# Patient Record
Sex: MALE | Race: WHITE | NOT HISPANIC OR LATINO | Employment: UNEMPLOYED | URBAN - METROPOLITAN AREA
[De-identification: names, ages, dates, MRNs, and addresses within clinical notes are randomized per-mention and may not be internally consistent; named-entity substitution may affect disease eponyms.]

---

## 2017-10-10 ENCOUNTER — APPOINTMENT (OUTPATIENT)
Dept: RADIOLOGY | Age: 72
End: 2017-10-10
Attending: PHYSICIAN ASSISTANT
Payer: COMMERCIAL

## 2017-10-10 ENCOUNTER — OFFICE VISIT (OUTPATIENT)
Dept: URGENT CARE | Age: 72
End: 2017-10-10
Payer: COMMERCIAL

## 2017-10-10 ENCOUNTER — TRANSCRIBE ORDERS (OUTPATIENT)
Dept: URGENT CARE | Age: 72
End: 2017-10-10

## 2017-10-10 DIAGNOSIS — M54.42 LOW BACK PAIN WITH LEFT-SIDED SCIATICA: ICD-10-CM

## 2017-10-10 PROCEDURE — 99203 OFFICE O/P NEW LOW 30 MIN: CPT | Performed by: FAMILY MEDICINE

## 2017-10-10 PROCEDURE — 72110 X-RAY EXAM L-2 SPINE 4/>VWS: CPT

## 2017-10-11 ENCOUNTER — GENERIC CONVERSION - ENCOUNTER (OUTPATIENT)
Dept: OTHER | Facility: OTHER | Age: 72
End: 2017-10-11

## 2017-10-29 NOTE — PROGRESS NOTES
Assessment    1  Acute left-sided low back pain with left-sided sciatica (724 2,724 3) (M54 42)    Plan  Acute left-sided low back pain with left-sided sciatica    · DrRx Flexeril 10 MG #30; 1/2-1 tablet every 8 hours or before bed as needed formuscle spasm and pain of back  Home use only   · PredniSONE 10 MG Oral Tablet; TAKE 6 TABLETS TODAY, THEN DECREASE BY 1TABLET EACH DAY UNTIL GONE  Unlinked    · * XR SPINE LUMBAR MINIMUM 4 VIEWS NON INJURY; Status:Complete;   Done:10Oct2017 08:05PM    Discussion/Summary  Discussion Summary:   Continue cold or warm compresses as needed  Start prednisone as directed  Use muscle relaxant as needed  Follow-up with family doctor in the next 2-3 days  Medication Side Effects Reviewed: Possible side effects of new medications were reviewed with the patient/guardian today  Understands and agrees with treatment plan: The treatment plan was reviewed with the patient/guardian  The patient/guardian understands and agrees with the treatment plan   Counseling Documentation With Imm: The patient, patient's family was counseled regarding diagnostic results,-- instructions for management  Follow Up Instructions: Follow Up with your Primary Care Provider in 3-5 days  If your symptoms worsen, go to the nearest [place] [address] Emergency Department  Chief Complaint    1  Back Pain  Chief Complaint Free Text Note Form: when to bend down t to  a pin cone and fell pain in the lower back and the pain it radiated to leg left side is been 1 week  stated he went to see the chiropractor 4 times and it did not work  History of Present Illness  HPI: 72-year-old male with low back pain that radiates down into his left leg to his knee, lateral aspect  Bent over last week to  a pinecone and had sudden back pain  No bowel or bladder dysfunction  Has been seeing chiropractor 4 times so far without relief   Has been using ice and trying to do home exercises and sleeping on the floor but is not getting better  Could not get into his family practice doctor today  He has been taking some Aleve  Chiropractor wanted x-rays  fever or chills  No unusual weight loss  No night sweats  No saddle anesthesia  Typically does not have high blood pressure  Hospital Based Practices Required Assessment:  Pain Assessment  the patient states they have pain  The patient describes the pain as sharp  (on a scale of 0 to 10, the patient rates the pain at 6 )  Abuse And Domestic Violence Screen   Yes, the patient is safe at home  -- The patient states no one is hurting them  Depression And Suicide Screen  No, the patient has not had thoughts of hurting themself  No, the patient has not felt depressed in the past 7 days  Prefered Language is  english  Review of Systems  Focused-Male:  Constitutional: no fever or chills, feels well, no tiredness, no recent weight loss or weight gain  Respiratory: no complaints of shortness of breath, no wheezing or cough, no dyspnea on exertion, no orthopnea or PND  Gastrointestinal: no complaints of abdominal pain, no constipation, no nausea or vomiting, no diarrhea or bloody stools  Genitourinary: no complaints of dysuria or incontinence, no hesitancy, no nocturia, no genital lesion, no inadequacy of penile erection  Musculoskeletal: as noted in HPI  Integumentary: no complaints of skin rash or lesion, no itching or dry skin, no skin wounds  Neurological: as noted in HPI  Active Problems  1  Acute upper respiratory infection (465 9) (J06 9)   2  Allergic rhinitis (477 9) (J30 9)   3  Benign essential hypertension (401 1) (I10)   4  Benign prostatic hypertrophy (600 00) (N40 0)   5  Concussion with no loss of consciousness (850 0) (S06 0X0A)   6  Herniated lumbar intervertebral disc (722 10) (M51 26)   7  Migraine headache (346 90) (G43 909)   8  Osteoarthritis (715 90) (M19 90)   9  Radiculopathy (729 2) (M54 10)    Past Medical History  1   History of Broken ribs (807 00) (S22 39XA)   2  History of Shoulder dislocation (831 00) (S43 006A)   3  History of Sore throat (462) (J02 9)  Active Problems And Past Medical History Reviewed: The active problems and past medical history were reviewed and updated today  Family History  Mother    1  Family history of Colon Cancer (V16 0)  Father    2  Family history of Cancer   3  Family history of Motor Vehicle Traffic Accident  Sister    4  Family history of Ovarian Cancer (V16 41)  Family History Reviewed: The family history was reviewed and updated today  Social History   · Being A Social Drinker   · Never A Smoker  Social History Reviewed: The social history was reviewed and updated today  Surgical History  1  History of Appendicostomy   2  History of Tonsillectomy  Surgical History Reviewed: The surgical history was reviewed and updated today  Current Meds  Medication List Reviewed: The medication list was reviewed and updated today  Allergies    1  No Known Drug Allergies    Vitals  Signs   Recorded: 93NIB4545 07:38PM   Temperature: 98 4 F, Temporal  Heart Rate: 84  Respiration: 18  Systolic: 416  Diastolic: 531  Height: 6 ft 1 in  Weight: 200 lb   BMI Calculated: 26 39  BSA Calculated: 2 15  O2 Saturation: 99  Pain Scale: 6    Physical Exam   Constitutional Well developed well-nourished male appears to be in mild to moderate pain sitting in stiff postured position on exam table  Pulmonary  Respiratory effort: No increased work of breathing or signs of respiratory distress  Auscultation of lungs: Clear to auscultation  Cardiovascular  Palpation of heart: Normal PMI, no thrills  Auscultation of heart: Normal rate and rhythm, normal S1 and S2, without murmurs  Musculoskeletal Limited range of motion of back with pain  Skin  Skin and subcutaneous tissue: Normal without rashes or lesions  Neurologic Patellar and Achilles DTRs are 1 over 4 bilaterally  -- Sensation left lateral thigh decreased to light touch on left versus right  Psychiatric  Orientation to person, place and time: Normal    Mood and affect: Normal        Results/Data  * XR SPINE LUMBAR MINIMUM 4 VIEWS NON INJURY 10Oct2017 08:05PM Martha Brambila Order Number: [id number]     Test Name Result Flag Reference   XR SPINE LUMBAR MINIMUM 4 VIEWS (Report)       LUMBAR SPINE   INDICATION: Low back pain radiating to the left side down to the knee  Symptoms started a week ago after bending over  COMPARISON: None   VIEWS: AP, lateral, bilateral oblique and coned down projections   IMAGES: 5   FINDINGS:   There is grade 1 anterolisthesis L4 on L5 which appears to be degenerative in etiology  There is no radiographic evidence of acute fracture or destructive osseous lesion  There is mild disc narrowing at L3-L4  There is moderate disc narrowing at L4-L5  There is mild narrowing at L5-S1  There is multilevel relatively severe degenerative arthritis of the facet joints from L3 to S1 with sclerotic and hypertrophic changes   evident at the facet joints bilaterally  A calcific density in the left upper quadrant is of uncertain etiology  It might be a kidney stone although it seems somewhat high  It could be a granuloma in the spleen  IMPRESSION:   Advanced chronic degenerative changes of the lower lumbar spine from L3 to S1 as detailed above with grade 1 anterolisthesis L4 on L5  No acute fractures are seen  Consider follow-up MRI for further assessment of any neural impingement given the history of radicular type pain    Workstation performed: [id number]   Signed by:  Meredith Estrada MD  10/11/17       Diagnostic Studies Reviewed: I personally reviewed the films/images/results in the office today  My interpretation follows  X-ray Review Lumbar spine:       Signatures   Electronically signed by :  Kendra Weems, [facility]; Oct 11 2017 12:20PM EST                       (Author)    Electronically signed by : Lupe Villarreal DO; Oct 12 2017  7:08AM EST                       (Co-author)

## 2018-01-16 NOTE — MISCELLANEOUS
Message  Called to patient to discuss radiology report on lumbar spine films  Discussed nodular lesion in left upper quadrant that could be kidney stone or granuloma in the spleen  Recommend patient follow with family doctor for further evaluation  Patient reports understanding  Signatures   Electronically signed by :  Aldo Gallegos, [facility]; Oct 11 2017 12:18PM EST                       (Author)

## 2018-01-22 ENCOUNTER — ALLSCRIPTS OFFICE VISIT (OUTPATIENT)
Dept: OTHER | Facility: OTHER | Age: 73
End: 2018-01-22

## 2018-01-22 DIAGNOSIS — Z12.5 ENCOUNTER FOR SCREENING FOR MALIGNANT NEOPLASM OF PROSTATE: ICD-10-CM

## 2018-01-22 DIAGNOSIS — R93.89 ABNORMAL FINDINGS ON DIAGNOSTIC IMAGING OF OTHER SPECIFIED BODY STRUCTURES: ICD-10-CM

## 2018-01-22 DIAGNOSIS — I10 ESSENTIAL (PRIMARY) HYPERTENSION: ICD-10-CM

## 2018-01-23 NOTE — PROGRESS NOTES
Assessment   1  Medicare annual wellness visit, subsequent (V70 0) (Z00 00)   2  Former smoker (V15 82) (Y61 062)   3  Herniated lumbar intervertebral disc (722 10) (M51 26)   4  Chronic bilateral low back pain with bilateral sciatica (724 2,724 3,338 29)     (M54 42,M54 41,G89 29)   5  Benign enlargement of prostate (600 00) (N40 0)   6  Need for vaccination with 13-polyvalent pneumococcal conjugate vaccine (V03 82) (Z23)   7  Benign essential hypertension (401 1) (I10)   8  Chest pain (786 50) (R07 9)   9  Eczema (692 9) (L30 9)   10  Prostate cancer screening (V76 44) (Z12 5)   11  Abnormal findings on diagnostic imaging of other specified body structures (793 99)      (R93 8)    Plan   Abnormal findings on diagnostic imaging of other specified body structures    · * US ABDOMEN COMPLETE; Status:Hold For - Scheduling; Requested for:22Jan2018; Benign essential hypertension    · Losartan Potassium 100 MG Oral Tablet; TAKE 1 TABLET DAILY   · (1) CBC/PLT/DIFF; Status:Active; Requested for:22Jan2018;    · (1) COMPREHENSIVE METABOLIC PANEL; Status:Active; Requested for:22Jan2018;    · (1) LIPID PANEL FASTING W DIRECT LDL REFLEX; Status:Active; Requested    for:22Jan2018;    · (1) TSH WITH FT4 REFLEX; Status:Active; Requested for:22Jan2018;    · EKG/ECG- POC; Status:Complete - Retrospective By Protocol Authorization;   Done:    25BNB2013 10:49AM  Chest pain    · *1 - [facility] (CARDIOLOGY ) Co-Management  *  Status: Active     Requested for: 65VOP3800  Care Summary provided  : Yes  Eczema    · Hydrocortisone 2 5 % External Cream; APPLY TO AFFECTED AREA TWICE DAILY    AS DIRECTED  Flu vaccine need    · Stop: Fluzone High-Dose 0 5 ML Intramuscular Suspension Prefilled    Syringe  Need for vaccination with 13-polyvalent pneumococcal conjugate vaccine    · Prevnar 13 Intramuscular Suspension  Prostate cancer screening    · (1) PSA (SCREEN) (Dx V76 44 Screen for Prostate Cancer); Status:Active;  Requested [employer]; Discussion/Summary   Discussion Summary:    Patient presents today for Medicare wellness visit as well as a follow-up for chronic health issues  He is experiencing hypertension  He has had multiple elevated blood pressure readings  I placed him on losartan 100 mg daily as he has had some very high readings recently  I would like him to get lab work as ordered  He is having some intermittent chest pain and his EKG shows a possible septal infarct  In light of his ongoing back pain and intermittent chest pain, I am going to refer him to Cardiology as quickly as possible  I am not convinced he could tolerate a stress echo  He will continue to follow closely with pain management  He does appear to have some diffuse eczema and I would like him to try Zyrtec 10 mg at bedtime and I also prescribed him hydrocortisone 2 5% cream to utilize twice daily as needed  Chief Complaint   Chief Complaint Free Text Note Form: Re-Establish care Flu shot      History of Present Illness   HPI: patient presents today for his 1st visit here in several years  He is concerned regarding some intermittent chest pain  He notes over the past year he has at least 6 or 7 episodes of chest pain  The most recent 1 occurred about a week ago while he was swimming  He became short of breath as well and had to stop swimming  He notes he is very active but has been limited lately by some significant back issues that are being monitored closely by pain management  His other episodes of chest pain have occurred mostly with stress, but occasionally with exertion  He has had no ongoing palpitations or lightheadedness  He has noted several elevated blood pressure readings while going to pain management  is having some chronic dry skin which is quite itchy diffusely  On his imaging for his low back issues he was noted to have a granuloma versus a kidney stone on the left upper abdomen  This has not been reimaged yet  Review of Systems   Complete-Male:      Constitutional: no fever,-- not feeling poorly,-- no recent weight gain,-- no chills,-- not feeling tired-- and-- no recent weight loss  Cardiovascular: chest pain, but-- as noted in HPI-- and-- the heart rate was not fast       Respiratory: shortness of breath during exertion, but-- no shortness of breath-- and-- no cough  Gastrointestinal: no abdominal pain,-- no nausea,-- no vomiting,-- no constipation-- and-- no diarrhea  Genitourinary: no dysuria-- and-- no urinary hesitancy  Musculoskeletal: no arthralgias  Integumentary: no rashes  Neurological: no headache  Psychiatric: depression, but-- as noted in HPI-- and-- no anxiety  Hematologic/Lymphatic: no tendency for easy bleeding-- and-- no tendency for easy bruising  Active Problems   1  Allergic rhinitis (477 9) (J30 9)   2  Benign enlargement of prostate (600 00) (N40 0)   3  Benign essential hypertension (401 1) (I10)   4  Herniated lumbar intervertebral disc (722 10) (M51 26)   5  Migraine headache (346 90) (G43 909)   6  Osteoarthritis (715 90) (M19 90)   7  Radiculopathy (729 2) (M54 10)    Past Medical History   1  History of Broken ribs (807 00) (S22 39XA)   2  History of Concussion with no loss of consciousness (850 0) (S06 0X0A)   3  History of Shoulder dislocation (831 00) (S43 006A)   4  History of Sore throat (462) (J02 9)  Active Problems And Past Medical History Reviewed: The active problems and past medical history were reviewed and updated today  Surgical History   1  History of Appendicostomy   2  History of Tonsillectomy  Surgical History Reviewed: The surgical history was reviewed and updated today  Family History   Mother    1  Family history of Colon Cancer (V16 0)  Father    2  Family history of Cancer   3  Family history of Motor Vehicle Traffic Accident  Sister    4   Family history of Ovarian Cancer (V16 41)  Family History Reviewed: The family history was reviewed and updated today  Social History    · Being A Social Drinker   · Former smoker (N19 02) (Z75 563)  Social History Reviewed: The social history was reviewed and updated today  Current Meds    1  Cetirizine HCl - 10 MG Oral Tablet; TAKE 1 TABLET AT BEDTIME; Therapy: 24PGC0521 to (Evaluate:19Oct2018) Recorded   2  Daily Multiple Vitamins TABS; TAKE 1 TABLET DAILY; Therapy: (Saba Sin) to Recorded   3  Glucosamine Chondroitin Complx TABS; Take 1 tablet daily; Therapy: (Recorded:22Jan2018) to Recorded    Allergies   1  Gabapentin CAPS    Vitals   Vital Signs    Recorded: 38MFC8359 10:30AM Recorded: 76MAD5732 09:54AM   Heart Rate  72   Respiration  18   Systolic 287 516   Diastolic 82 334   Height  6 ft 1 in   Weight  211 lb    BMI Calculated  27 84   BSA Calculated  2 2   O2 Saturation  97, RA     Physical Exam        Constitutional      General appearance: No acute distress, well appearing and well nourished  Eyes      Conjunctiva and lids: No swelling, erythema, or discharge  Pupils and irises: Equal, round and reactive to light  Ears, Nose, Mouth, and Throat      Nasal mucosa, septum, and turbinates: Normal without edema or erythema  Oropharynx: Normal with no erythema, edema, exudate or lesions  Pulmonary      Respiratory effort: No increased work of breathing or signs of respiratory distress  Auscultation of lungs: Clear to auscultation, equal breath sounds bilaterally, no wheezes, no rales, no rhonci  Cardiovascular      Auscultation of heart: Normal rate and rhythm, normal S1 and S2, without murmurs  Examination of extremities for edema and/or varicosities: Normal        Abdomen      Abdomen: Non-tender, no masses  Liver and spleen: No hepatomegaly or splenomegaly  Skin      Examination of the skin for lesions: Abnormal  -- Diffuse dry skin with dry patches        Psychiatric Orientation to person, place and time: Normal        Mood and affect: Normal           Results/Data   EKG/ECG- POC [id number] 10:49AM [facility]      Test Name Result Flag Reference   EKG/ECG 1/22/2018          Signatures    Electronically signed by : LANE Torres ; Jan 22 2018 11:23AM EST                       (Author)

## 2018-01-24 ENCOUNTER — APPOINTMENT (OUTPATIENT)
Dept: LAB | Age: 73
End: 2018-01-24
Payer: COMMERCIAL

## 2018-01-24 ENCOUNTER — HOSPITAL ENCOUNTER (OUTPATIENT)
Dept: RADIOLOGY | Age: 73
Discharge: HOME | End: 2018-01-24
Payer: COMMERCIAL

## 2018-01-24 ENCOUNTER — TRANSCRIBE ORDERS (OUTPATIENT)
Dept: ADMINISTRATIVE | Age: 73
End: 2018-01-24

## 2018-01-24 DIAGNOSIS — Z12.5 ENCOUNTER FOR SCREENING FOR MALIGNANT NEOPLASM OF PROSTATE: ICD-10-CM

## 2018-01-24 DIAGNOSIS — R93.89 ABNORMAL FINDINGS ON DIAGNOSTIC IMAGING OF OTHER SPECIFIED BODY STRUCTURES: ICD-10-CM

## 2018-01-24 DIAGNOSIS — I10 ESSENTIAL (PRIMARY) HYPERTENSION: ICD-10-CM

## 2018-01-24 LAB
ALBUMIN SERPL BCP-MCNC: 3.9 G/DL (ref 3.5–5)
ALP SERPL-CCNC: 73 U/L (ref 46–116)
ALT SERPL W P-5'-P-CCNC: 24 U/L (ref 12–78)
ANION GAP SERPL CALCULATED.3IONS-SCNC: 6 MMOL/L (ref 4–13)
AST SERPL W P-5'-P-CCNC: 18 U/L (ref 5–45)
BASOPHILS # BLD AUTO: 0.09 THOUSANDS/ΜL (ref 0–0.1)
BASOPHILS NFR BLD AUTO: 1 % (ref 0–1)
BILIRUB SERPL-MCNC: 1.12 MG/DL (ref 0.2–1)
BUN SERPL-MCNC: 17 MG/DL (ref 5–25)
CALCIUM SERPL-MCNC: 9.4 MG/DL (ref 8.3–10.1)
CHLORIDE SERPL-SCNC: 104 MMOL/L (ref 100–108)
CHOLEST SERPL-MCNC: 192 MG/DL (ref 50–200)
CO2 SERPL-SCNC: 32 MMOL/L (ref 21–32)
CREAT SERPL-MCNC: 1.21 MG/DL (ref 0.6–1.3)
EOSINOPHIL # BLD AUTO: 0.54 THOUSAND/ΜL (ref 0–0.61)
EOSINOPHIL NFR BLD AUTO: 6 % (ref 0–6)
ERYTHROCYTE [DISTWIDTH] IN BLOOD BY AUTOMATED COUNT: 13.6 % (ref 11.6–15.1)
GFR SERPL CREATININE-BSD FRML MDRD: 59 ML/MIN/1.73SQ M
GLUCOSE P FAST SERPL-MCNC: 95 MG/DL (ref 65–99)
HCT VFR BLD AUTO: 46.5 % (ref 36.5–49.3)
HDLC SERPL-MCNC: 62 MG/DL (ref 40–60)
HGB BLD-MCNC: 15.4 G/DL (ref 12–17)
LDLC SERPL CALC-MCNC: 111 MG/DL (ref 0–100)
LYMPHOCYTES # BLD AUTO: 1.83 THOUSANDS/ΜL (ref 0.6–4.47)
LYMPHOCYTES NFR BLD AUTO: 20 % (ref 14–44)
MCH RBC QN AUTO: 30.7 PG (ref 26.8–34.3)
MCHC RBC AUTO-ENTMCNC: 33.1 G/DL (ref 31.4–37.4)
MCV RBC AUTO: 93 FL (ref 82–98)
MONOCYTES # BLD AUTO: 0.76 THOUSAND/ΜL (ref 0.17–1.22)
MONOCYTES NFR BLD AUTO: 8 % (ref 4–12)
NEUTROPHILS # BLD AUTO: 6.05 THOUSANDS/ΜL (ref 1.85–7.62)
NEUTS SEG NFR BLD AUTO: 65 % (ref 43–75)
NRBC BLD AUTO-RTO: 0 /100 WBCS
PLATELET # BLD AUTO: 357 THOUSANDS/UL (ref 149–390)
PMV BLD AUTO: 10.4 FL (ref 8.9–12.7)
POTASSIUM SERPL-SCNC: 4.7 MMOL/L (ref 3.5–5.3)
PROT SERPL-MCNC: 7.2 G/DL (ref 6.4–8.2)
PSA SERPL-MCNC: 0.8 NG/ML (ref 0–4)
RBC # BLD AUTO: 5.01 MILLION/UL (ref 3.88–5.62)
SODIUM SERPL-SCNC: 142 MMOL/L (ref 136–145)
TRIGL SERPL-MCNC: 94 MG/DL
TSH SERPL DL<=0.05 MIU/L-ACNC: 3.2 UIU/ML (ref 0.36–3.74)
WBC # BLD AUTO: 9.3 THOUSAND/UL (ref 4.31–10.16)

## 2018-01-24 PROCEDURE — G0103 PSA SCREENING: HCPCS

## 2018-01-24 PROCEDURE — 85025 COMPLETE CBC W/AUTO DIFF WBC: CPT

## 2018-01-24 PROCEDURE — 36415 COLL VENOUS BLD VENIPUNCTURE: CPT

## 2018-01-24 PROCEDURE — 80061 LIPID PANEL: CPT

## 2018-01-24 PROCEDURE — 76700 US EXAM ABDOM COMPLETE: CPT

## 2018-01-24 PROCEDURE — 84443 ASSAY THYROID STIM HORMONE: CPT

## 2018-01-24 PROCEDURE — 80053 COMPREHEN METABOLIC PANEL: CPT

## 2018-01-24 NOTE — PROGRESS NOTES
Assessment    1  Medicare annual wellness visit, subsequent (V70 0) (Z00 00)   2  Former smoker (V15 82) (A52 254)   · quit 1974   3  Herniated lumbar intervertebral disc (722 10) (M51 26)   · L4-L5   4  Chronic bilateral low back pain with bilateral sciatica (724 2,724 3,338 29)   (M54 42,M54 41,G89 29)   5  Benign enlargement of prostate (600 00) (N40 0)   6  Need for vaccination with 13-polyvalent pneumococcal conjugate vaccine (V03 82) (Z23)   7  Benign essential hypertension (401 1) (I10)   8  Chest pain (786 50) (R07 9)   9  Eczema (692 9) (L30 9)   10  Prostate cancer screening (V76 44) (Z12 5)   11  Abnormal findings on diagnostic imaging of other specified body structures (793 99)    (R93 8)    Plan  Abnormal findings on diagnostic imaging of other specified body structures    · * US ABDOMEN COMPLETE; Status:Active; Requested for:22Jan2018; Benign essential hypertension    · Losartan Potassium 100 MG Oral Tablet; TAKE 1 TABLET DAILY   · (1) CBC/PLT/DIFF; Status:Active; Requested for:22Jan2018;    · (1) COMPREHENSIVE METABOLIC PANEL; Status:Active; Requested for:22Jan2018;    · (1) LIPID PANEL FASTING W DIRECT LDL REFLEX; Status:Active; Requested  for:22Jan2018;    · (1) TSH WITH FT4 REFLEX; Status:Active;  Requested for:22Jan2018;    · EKG/ECG- POC; Status:Complete - Retrospective By Protocol Authorization;   Done:  73DVJ7130 10:49AM  Chest pain    · *1 - [facility] (CARDIOLOGY ) Co-Management  *  Status: Complete   Done: [id number]  Care Summary provided  : Yes  Eczema    · Hydrocortisone 2 5 % External Cream; APPLY TO AFFECTED AREA TWICE DAILY  AS DIRECTED  Exercise counseling    · There are many exercise options for seniors ; Status:Complete - Retrospective By  Protocol Authorization;   Done: [id number]  Flu vaccine need    · Stop: Fluzone High-Dose 0 5 ML Intramuscular Suspension Prefilled  Syringe  Medicare annual wellness visit, subsequent    · *VB - Fall Risk Assessment  (Dx Z13 89 Screen for Neurologic Disorder);  Status:Complete - Retrospective By Protocol Authorization;   Done: 32MLH9838 11:39AM   · *VB - Urinary Incontinence Screen (Dx Z13 89 Screen for UI); Status:Complete -  Retrospective By Protocol Authorization;   Done: 08OSU9670 11:39AM   · *VB-Depression Screening; Status:Complete - Retrospective By Protocol Authorization;    Done: 49WZM6975 11:39AM  Need for vaccination with 13-polyvalent pneumococcal conjugate vaccine    · Prevnar 13 Intramuscular Suspension  Prostate cancer screening    · (1) PSA (SCREEN) (Dx V76 44 Screen for Prostate Cancer); Status:Active; Requested  for:22Jan2018; Discussion/Summary    Patient presents today for Medicare wellness visit  He is having some situational depression based on his ongoing back pain  I mentioned possibly trying him on Duloxetine, but he would like to hold off on that for the time being  He has a living will and he will get us a copy of that at some point  he will have his Prevnar 13 vaccine today but declines flu shot  He is up-to-date on colonoscopy  PSA will be checked with labs today for prostate cancer screening  He will continue to follow closely with pain management for his ongoing back issues  Impression: Subsequent Annual Wellness Visit  Chief Complaint  AWV/ Re-Establish care  Decline's Flu shot      History of Present Illness  Welcome to Medicare and Wellness Visits: The patient is being seen for the subsequent annual wellness visit  Medicare Screening and Risk Factors   Hospitalizations: no previous hospitalizations  Medicare Screening Tests Risk Questions   Drug and Alcohol Use: The patient is a former cigarette smoker  The patient reports drinking 1-2 drinks per month  He has never used illicit drugs     Diet and Physical Activity: Current diet includes well balanced meals, limited junk food, 1 servings of fruit per day, 1 servings of vegetables per day, 1 servings of meat per day, 1 servings of whole grains per day, 1 servings of dairy products per day, 1 cups of coffee per day, 1 cups of tea per day and 3-4 cups of water daily  He exercises 3 times per week  Exercise: walking, swimming, stretching 30 minutes per day  Mood Disorder and Cognitive Impairment Screening:   Depression screening  negative for symptoms  He denies feeling down, depressed, or hopeless over the past two weeks  He denies feeling little interest or pleasure in doing things over the past two weeks  Cognitive impairment screening: denies difficulty learning/retaining new information, denies difficulty handling complex tasks, denies difficulty with reasoning, denies difficulty with spatial ability and orientation, denies difficulty with language and denies difficulty with behavior  Functional Ability/Level of Safety: Hearing is slightly decreased, slightly decreased in the right ear and slightly decreased in the left ear  He does not use a hearing aid  Activities of daily living details: does not need help using the phone, no transportation help needed, does not need help shopping, no meal preparation help needed, does not need help doing housework, does not need help doing laundry, does not need help managing medications and does not need help managing money  Fall risk factors: The patient fell 0 times in the past 12 months  Home safety risk factors:  household clutter and no grab bars in the bathroom, but no unfamiliar surroundings, no loose rugs, no poor household lighting, no uneven floors and handrails on the stairs  Advance Directives: Advance directives: living will, durable power of  for health care directives, advance directives and Pt Instructed to bring in copy of living will at next appt  Co-Managers and Medical Equipment/Suppliers: See Patient Care Team      Active Problems    1  Allergic rhinitis (477 9) (J30 9)   2  Benign enlargement of prostate (600 00) (N40 0)   3   Benign essential hypertension (401 1) (I10)   4  Flu vaccine need (V04 81) (Z23)   5  Herniated lumbar intervertebral disc (722 10) (M51 26)   6  Migraine headache (346 90) (G43 909)   7  Osteoarthritis (715 90) (M19 90)   8  Radiculopathy (729 2) (M54 10)    Past Medical History    · History of Broken ribs (807 00) (S22 39XA)   · History of Concussion with no loss of consciousness (850 0) (S06 0X0A)   · History of Shoulder dislocation (831 00) (S43 006A)   · History of Sore throat (462) (J02 9)    The active problems and past medical history were reviewed and updated today  Surgical History    · History of Appendicostomy   · History of Tonsillectomy    Family History  Mother    · Family history of Colon Cancer (V16 0)  Father    · Family history of Cancer   · Family history of Motor Vehicle Traffic Accident  Sister    · Family history of Ovarian Cancer (V16 41)    The family history was reviewed and updated today  Social History    · Being A Social Drinker   · Former smoker (W83 44) (B12 382)   · quit 1974  The social history was reviewed and updated today  Current Meds   1  Cetirizine HCl - 10 MG Oral Tablet; TAKE 1 TABLET AT BEDTIME; Therapy: 20ZIN6990 to (Evaluate:19Oct2018) Recorded   2  Daily Multiple Vitamins TABS; TAKE 1 TABLET DAILY; Therapy: (21 ) to Recorded   3  Glucosamine Chondroitin Complx TABS; Take 1 tablet daily; Therapy: (Recorded:22Jan2018) to Recorded    The medication list was reviewed and updated today  Allergies    1   Gabapentin CAPS    Immunizations   1    PPSV  14-May-2014    Tdap  14-May-2014    Varicella  2011     Vitals  Signs    Systolic: 664  Diastolic: 82   Heart Rate: 72  Respiration: 18  Systolic: 486  Diastolic: 728  Height: 6 ft 1 in  Weight: 211 lb   BMI Calculated: 27 84  BSA Calculated: 2 2  O2 Saturation: 97, RA    Physical Exam    Genitourinary   Digital rectal exam of prostate: Abnormal   The prostate was enlarged, but had no palpable nodules, was nontender and was not fluctuant        Results/Data  EKG/ECG- POC [id number] 10:49AM [place] Isidro Babinski     Test Name Result Flag Reference   EKG/ECG 1/22/2018         Signatures   Electronically signed by : LANE Mahajan ; Jan 22 2018 12:49PM EST                       (Author)

## 2018-01-26 ENCOUNTER — TELEPHONE (OUTPATIENT)
Dept: FAMILY MEDICINE CLINIC | Facility: CLINIC | Age: 73
End: 2018-01-26

## 2018-01-26 NOTE — TELEPHONE ENCOUNTER
----- Message from Sunni Fofana MD sent at 1/25/2018  9:52 PM EST -----  Call patient regarding test   US OK - confirms benign spleen lesion   He has a benign kidney cyst as well

## 2018-02-09 ENCOUNTER — OFFICE VISIT (OUTPATIENT)
Dept: CARDIOLOGY CLINIC | Facility: CLINIC | Age: 73
End: 2018-02-09
Payer: COMMERCIAL

## 2018-02-09 VITALS
WEIGHT: 214 LBS | DIASTOLIC BLOOD PRESSURE: 86 MMHG | HEART RATE: 60 BPM | HEIGHT: 73 IN | SYSTOLIC BLOOD PRESSURE: 146 MMHG | BODY MASS INDEX: 28.36 KG/M2

## 2018-02-09 DIAGNOSIS — R07.9 CHEST PAIN, UNSPECIFIED TYPE: Primary | ICD-10-CM

## 2018-02-09 PROCEDURE — 93000 ELECTROCARDIOGRAM COMPLETE: CPT | Performed by: INTERNAL MEDICINE

## 2018-02-09 PROCEDURE — 99203 OFFICE O/P NEW LOW 30 MIN: CPT | Performed by: INTERNAL MEDICINE

## 2018-02-09 RX ORDER — LOSARTAN POTASSIUM 100 MG/1
1 TABLET ORAL DAILY
COMMUNITY
Start: 2018-01-22 | End: 2019-01-14 | Stop reason: SDUPTHER

## 2018-02-09 RX ORDER — CETIRIZINE HYDROCHLORIDE 10 MG/1
1 TABLET ORAL
COMMUNITY
Start: 2018-01-22

## 2018-02-09 RX ORDER — MULTIVITAMIN
1 TABLET ORAL DAILY
COMMUNITY

## 2018-02-09 RX ORDER — LANOLIN ALCOHOL/MO/W.PET/CERES
1 CREAM (GRAM) TOPICAL DAILY
COMMUNITY

## 2018-02-09 NOTE — PROGRESS NOTES
Consultation - Cardiology   Virginia Favre 67 y o  male MRN: [id number]  Unit/Bed#:  Encounter: [id number]  Physician Requesting Consult: No att  providers found  Reason for Consult / Principal Problem:  Chest pain    Assessment:  Active Problems:    * No active hospital problems  *   Chest pain syndrome    Plan:  Chest pain syndrome, somewhat atypical   Favor noninvasive evaluation  The patient feels he can walk on a treadmill, will do a stress test and echocardiogram   Favor no interventions he had about tests are unremarkable  No restrictions of medications at the present time  History of Present Illness     HPI: Virginia Favre is a 67y o  year old male who presents with chest discomfort, kindly referred by primary physician  Most pleasant 66-year-old white male who has no previous cardiac history  The patient states he had been experiencing somewhat localized left precordial chest discomfort  Mild-to-moderate in intensity  There is no radiation  There is no associated diaphoresis, this discomfort is typically nonexertional but can read during exercise as well  There are no particular triggering factors  Pecolia Harrison was resolve spontaneously  He has had no prolonged episodes  The patient states this is been going on for several months, there is possibly some crescendo pattern over last several weeks  But overall is not very frequent  The patient suffered from DJD of the lumbosacral spine and is undergoing topical injections  Despite that he still very active, enjoys swimming, he does that about 3 times a week  Mild-to-moderate intensity without any symptoms most of the time  He has history of hypertension which is well control medications  His lipids recently checked, and  with total cholesterol 192 and an HDL 62        Review of Systems:    Alert awake oriented, comfortable, denies any complaints  No fevers chills nausea vomiting  No weakness, dizziness, seizures  no cough, shortness of breath, or wheezing  Denies any palpitations, chest pain, diaphoresis  Denies leg edema, pain or paresthesias  Denies any skin rashes he has dry skin eczema  Denies abdominal pain, bloody stools, masses  Poor sleep secondary to DJD  Decreased energy  Historical Information   Past Medical History:   Diagnosis Date    Benign essential hypertension     Chest pain      Past Surgical History:   Procedure Laterality Date    CONTINENT APPENDICOSTOMY      TONSILLECTOMY       History   Alcohol Use    Yes     Comment: SOCIAL     History   Drug use: Unknown     History   Smoking Status    Former Smoker   Smokeless Tobacco    Never Used     Family History: non-contributory    Meds/Allergies   all current active meds have been reviewed  Allergies   Allergen Reactions    Gabapentin Other (See Comments)     Severe mood swings, anger       Objective   Vitals: Blood pressure 146/86, pulse 60, height 6' 1" (1 854 m), weight 97 1 kg (214 lb)  , Body mass index is 28 23 kg/m²  ,     [id number]            Physical Exam:  GEN: Easton Rae appears well, alert and oriented x 3, pleasant and cooperative   HEENT: pupils equal, round, anicteric sclera  NECK: supple,2+upstroke, no carotid bruits , no JVD  HEART: regular rhythm, normal S1 and S2, no murmurs, clicks, gallops or rubs   Mild left-sided precordial tenderness, slightly reproduce symptoms  LUNGS: clear to auscultation bilaterally; no wheezes, rales, or rhonchi   ABDOMEN: normal bowel sounds, soft, no tenderness, no distention  EXTREMITIES: peripheral pulses 2+l; no clubbing, cyanosis, or edema  NEURO: no focal findings   SKIN: normal, no erythema, no diaphoresis , without suspicious lesions on exposed skin    Lab Results:   No visits with results within 1 Day(s) from this visit     Latest known visit with results is:   Appointment on 01/24/2018   Component Date Value Ref Range Status    Cholesterol 01/24/2018 192  50 - 200 mg/dL Final    Triglycerides 01/24/2018 94 <=150 mg/dL Final    HDL, Direct 01/24/2018 62* 40 - 60 mg/dL Final    LDL Calculated 01/24/2018 111* 0 - 100 mg/dL Final    Sodium 01/24/2018 142  136 - 145 mmol/L Final    Potassium 01/24/2018 4 7  3 5 - 5 3 mmol/L Final    Chloride 01/24/2018 104  100 - 108 mmol/L Final    CO2 01/24/2018 32  21 - 32 mmol/L Final    Anion Gap 01/24/2018 6  4 - 13 mmol/L Final    BUN 01/24/2018 17  5 - 25 mg/dL Final    Creatinine 01/24/2018 1 21  0 60 - 1 30 mg/dL Final    Glucose, Fasting 01/24/2018 95  65 - 99 mg/dL Final    Calcium 01/24/2018 9 4  8 3 - 10 1 mg/dL Final    AST 01/24/2018 18  5 - 45 U/L Final    ALT 01/24/2018 24  12 - 78 U/L Final    Alkaline Phosphatase 01/24/2018 73  46 - 116 U/L Final    Total Protein 01/24/2018 7 2  6 4 - 8 2 g/dL Final    Albumin 01/24/2018 3 9  3 5 - 5 0 g/dL Final    Total Bilirubin 01/24/2018 1 12* 0 20 - 1 00 mg/dL Final    eGFR 01/24/2018 59  ml/min/1 73sq m Final    TSH 3RD GENERATON 01/24/2018 3 200  0 358 - 3 740 uIU/mL Final    PSA 01/24/2018 0 8  0 0 - 4 0 ng/mL Final    WBC 01/24/2018 9 30  4 31 - 10 16 Thousand/uL Final    RBC 01/24/2018 5 01  3 88 - 5 62 Million/uL Final    Hemoglobin 01/24/2018 15 4  12 0 - 17 0 g/dL Final    Hematocrit 01/24/2018 46 5  36 5 - 49 3 % Final    MCV 01/24/2018 93  82 - 98 fL Final    MCH 01/24/2018 30 7  26 8 - 34 3 pg Final    MCHC 01/24/2018 33 1  31 4 - 37 4 g/dL Final    RDW 01/24/2018 13 6  11 6 - 15 1 % Final    MPV 01/24/2018 10 4  8 9 - 12 7 fL Final    Platelets 99/59/6537 357  149 - 390 Thousands/uL Final    nRBC 01/24/2018 0  /100 WBCs Final    Neutrophils Relative 01/24/2018 65  43 - 75 % Final    Lymphocytes Relative 01/24/2018 20  14 - 44 % Final    Monocytes Relative 01/24/2018 8  4 - 12 % Final    Eosinophils Relative 01/24/2018 6  0 - 6 % Final    Basophils Relative 01/24/2018 1  0 - 1 % Final    Neutrophils Absolute 01/24/2018 6 05  1 85 - 7 62 Thousands/µL Final    Lymphocytes Absolute 01/24/2018 1 83  0 60 - 4 47 Thousands/µL Final    Monocytes Absolute 01/24/2018 0 76  0 17 - 1 22 Thousand/µL Final    Eosinophils Absolute 01/24/2018 0 54  0 00 - 0 61 Thousand/µL Final    Basophils Absolute 01/24/2018 0 09  0 00 - 0 10 Thousands/µL Final                       Invalid input(s): LABALBU      Chest X-Ray is obtained; result -      Imaging:     EKG:  Normal sinus rhythm normal EKG    Echo:

## 2018-02-26 ENCOUNTER — HOSPITAL ENCOUNTER (OUTPATIENT)
Dept: NON INVASIVE DIAGNOSTICS | Facility: CLINIC | Age: 73
Discharge: HOME | End: 2018-02-26
Payer: COMMERCIAL

## 2018-02-26 DIAGNOSIS — R07.9 CHEST PAIN, UNSPECIFIED TYPE: ICD-10-CM

## 2018-02-26 PROCEDURE — 93016 CV STRESS TEST SUPVJ ONLY: CPT | Performed by: INTERNAL MEDICINE

## 2018-02-26 PROCEDURE — 93018 CV STRESS TEST I&R ONLY: CPT | Performed by: INTERNAL MEDICINE

## 2018-02-26 PROCEDURE — 93017 CV STRESS TEST TRACING ONLY: CPT

## 2018-02-26 PROCEDURE — 93306 TTE W/DOPPLER COMPLETE: CPT | Performed by: INTERNAL MEDICINE

## 2018-02-26 PROCEDURE — 93306 TTE W/DOPPLER COMPLETE: CPT

## 2018-02-27 LAB
CHEST PAIN STATEMENT: NORMAL
MAX DIASTOLIC BP: 100 MMHG
MAX HEART RATE: 150 BPM
MAX PREDICTED HEART RATE: 148 BPM
MAX. SYSTOLIC BP: 190 MMHG
PROTOCOL NAME: NORMAL
REASON FOR TERMINATION: NORMAL
TARGET HR FORMULA: NORMAL
TEST INDICATION: NORMAL
TIME IN EXERCISE PHASE: NORMAL

## 2018-04-03 PROBLEM — G89.29 CHRONIC BILATERAL LOW BACK PAIN WITH BILATERAL SCIATICA: Status: ACTIVE | Noted: 2018-01-22

## 2018-04-03 PROBLEM — M54.42 CHRONIC BILATERAL LOW BACK PAIN WITH BILATERAL SCIATICA: Status: ACTIVE | Noted: 2018-01-22

## 2018-04-03 PROBLEM — R93.89 ABNORMAL FINDINGS ON DIAGNOSTIC IMAGING OF OTHER SPECIFIED BODY STRUCTURES: Status: ACTIVE | Noted: 2018-01-22

## 2018-04-03 PROBLEM — M54.41 CHRONIC BILATERAL LOW BACK PAIN WITH BILATERAL SCIATICA: Status: ACTIVE | Noted: 2018-01-22

## 2018-04-03 PROBLEM — M54.42 ACUTE LEFT-SIDED LOW BACK PAIN WITH LEFT-SIDED SCIATICA: Status: ACTIVE | Noted: 2017-10-10

## 2018-04-04 ENCOUNTER — CONSULT (OUTPATIENT)
Dept: FAMILY MEDICINE CLINIC | Facility: CLINIC | Age: 73
End: 2018-04-04
Payer: COMMERCIAL

## 2018-04-04 VITALS
BODY MASS INDEX: 28.6 KG/M2 | DIASTOLIC BLOOD PRESSURE: 84 MMHG | SYSTOLIC BLOOD PRESSURE: 150 MMHG | WEIGHT: 215.8 LBS | HEIGHT: 73 IN

## 2018-04-04 DIAGNOSIS — I10 BENIGN ESSENTIAL HYPERTENSION: ICD-10-CM

## 2018-04-04 DIAGNOSIS — Z01.818 PREOP GENERAL PHYSICAL EXAM: Primary | ICD-10-CM

## 2018-04-04 PROBLEM — M54.42 CHRONIC BILATERAL LOW BACK PAIN WITH BILATERAL SCIATICA: Status: RESOLVED | Noted: 2018-01-22 | Resolved: 2018-04-04

## 2018-04-04 PROBLEM — M54.41 CHRONIC BILATERAL LOW BACK PAIN WITH BILATERAL SCIATICA: Status: RESOLVED | Noted: 2018-01-22 | Resolved: 2018-04-04

## 2018-04-04 PROBLEM — G89.29 CHRONIC BILATERAL LOW BACK PAIN WITH BILATERAL SCIATICA: Status: RESOLVED | Noted: 2018-01-22 | Resolved: 2018-04-04

## 2018-04-04 PROCEDURE — 99213 OFFICE O/P EST LOW 20 MIN: CPT | Performed by: FAMILY MEDICINE

## 2018-04-04 NOTE — ASSESSMENT & PLAN NOTE
Originally systolic blood pressure was quite elevated at 170, but this decreased to 150  He will continue his losartan daily  And we will continue to monitor

## 2018-04-04 NOTE — PROGRESS NOTES
Assessment/Plan:    Acute left-sided low back pain with left-sided sciatica  He is scheduled for microdiskectomy for left-sided low back pain and left-sided sciatic symptoms  Benign essential hypertension    Originally systolic blood pressure was quite elevated at 170, but this decreased to 150  He will continue his losartan daily  And we will continue to monitor  Chest pain    He has had atypical chest pain with negative workup by Cardiology including stress test and echo  Diagnoses and all orders for this visit:    Preop general physical exam  Comments:    Normal physical exam today  Will await preop testing and EKG to give clearance  Benign essential hypertension          Subjective:      Patient ID: Simeon Denver is a 67 y o  male  He is scheduled for microdiscectomy left L3-4 on 4/16  Left back pain and left sciatic sx since October  He had injections and PT and still having sx  PMH  HTN which has been stable  PSH- tonsillectomy and appendectomy many yrs ago  No anesthesia problems  History of CP intermittent over past 8 or 9 mos  He had complete cardiac workup including stress and echo  Stress test was normal as heaving maximum target heart rate  There were no signs of ischemia, just occasional PACs  Systolic function was normal  Ejection fraction was estimated to be 65 %  There were no regional wall motion abnormalities  There was mild concentric hypertrophy  Doppler parameters were consistent with abnormal left ventricular relaxation (grade 1 diastolic dysfunction)  No SOB or palpitations  CP is very intermittent lasting a couple minutes  It usually occurs once weekly  Hx of smoking in the 1960's, no asthma or COPD  Exercises regularly with swimming 3 times per week  Walks several blocks without any problem  No easy bleeding or bruising  No recent illnesses          The following portions of the patient's history were reviewed and updated as appropriate: allergies, current medications, past family history, past medical history, past social history, past surgical history and problem list     Review of Systems   Constitutional: Negative for activity change, chills, fatigue and fever  HENT: Negative for congestion, ear pain, sinus pressure and sore throat  Eyes: Negative for pain and visual disturbance  Respiratory: Negative for cough, chest tightness, shortness of breath and wheezing  Cardiovascular: Positive for chest pain  Negative for palpitations and leg swelling  Gastrointestinal: Negative for abdominal pain, blood in stool, constipation, diarrhea, nausea and vomiting  Endocrine: Negative for polydipsia and polyuria  Genitourinary: Negative for difficulty urinating, dysuria, frequency and urgency  Musculoskeletal: Negative for arthralgias, joint swelling and myalgias  Skin: Negative for rash  Neurological: Positive for weakness and numbness  Negative for dizziness and headaches  Numbness and weakness left leg   Hematological: Negative for adenopathy  Does not bruise/bleed easily  Psychiatric/Behavioral: Negative for dysphoric mood  The patient is not nervous/anxious  Objective:      /84   Ht 6' 1" (1 854 m)   Wt 97 9 kg (215 lb 12 8 oz)   BMI 28 47 kg/m²          Physical Exam   Constitutional: He appears well-developed and well-nourished  Neck: Normal range of motion  Neck supple  No JVD present  No tracheal deviation present  No thyromegaly present  Cardiovascular: Normal rate, regular rhythm and normal heart sounds  Pulmonary/Chest: Effort normal and breath sounds normal    Lymphadenopathy:     He has no cervical adenopathy  Nursing note and vitals reviewed

## 2018-04-04 NOTE — PATIENT INSTRUCTIONS
Acute left-sided low back pain with left-sided sciatica  He is scheduled for microdiskectomy for left-sided low back pain and left-sided sciatic symptoms  Benign essential hypertension    Originally systolic blood pressure was quite elevated at 170, but this decreased to 150  He will continue his losartan daily  And we will continue to monitor  Chest pain    He has had atypical chest pain with negative workup by Cardiology including stress test and echo  Will await EKG and preop labs next week

## 2018-05-23 ENCOUNTER — TELEPHONE (OUTPATIENT)
Dept: FAMILY MEDICINE CLINIC | Facility: CLINIC | Age: 73
End: 2018-05-23

## 2018-05-23 NOTE — TELEPHONE ENCOUNTER
Pt called stating that he is a little concerned about his blood pressure  States he has been on losartan 100mg for several months and was supposed to have a f/u appt that has been delayed a couple times now but his pressures have still consistently been running 140s/90s    Pt denies any h/a, dizziness, nausea, blurred vision, cp, sob, etc

## 2018-05-25 DIAGNOSIS — I10 HYPERTENSION, UNSPECIFIED TYPE: Primary | ICD-10-CM

## 2018-05-30 RX ORDER — AMLODIPINE BESYLATE 5 MG/1
5 TABLET ORAL DAILY
Qty: 30 TABLET | Refills: 5 | Status: SHIPPED | OUTPATIENT
Start: 2018-05-30 | End: 2018-12-03 | Stop reason: SDUPTHER

## 2018-06-20 ENCOUNTER — OFFICE VISIT (OUTPATIENT)
Dept: FAMILY MEDICINE CLINIC | Facility: CLINIC | Age: 73
End: 2018-06-20
Payer: COMMERCIAL

## 2018-06-20 VITALS
BODY MASS INDEX: 27.83 KG/M2 | OXYGEN SATURATION: 96 % | DIASTOLIC BLOOD PRESSURE: 68 MMHG | HEIGHT: 73 IN | HEART RATE: 64 BPM | WEIGHT: 210 LBS | RESPIRATION RATE: 18 BRPM | SYSTOLIC BLOOD PRESSURE: 120 MMHG

## 2018-06-20 DIAGNOSIS — Z98.890 S/P LUMBAR LAMINECTOMY: ICD-10-CM

## 2018-06-20 DIAGNOSIS — I10 BENIGN ESSENTIAL HYPERTENSION: ICD-10-CM

## 2018-06-20 DIAGNOSIS — L57.0 ACTINIC KERATOSES: ICD-10-CM

## 2018-06-20 DIAGNOSIS — Z11.59 NEED FOR HEPATITIS C SCREENING TEST: ICD-10-CM

## 2018-06-20 DIAGNOSIS — M51.26 HNP (HERNIATED NUCLEUS PULPOSUS), LUMBAR: ICD-10-CM

## 2018-06-20 DIAGNOSIS — Z23 NEED FOR ZOSTER VACCINE: Primary | ICD-10-CM

## 2018-06-20 DIAGNOSIS — R53.83 FATIGUE, UNSPECIFIED TYPE: ICD-10-CM

## 2018-06-20 DIAGNOSIS — Z12.5 PROSTATE CANCER SCREENING: ICD-10-CM

## 2018-06-20 PROBLEM — M54.42 ACUTE LEFT-SIDED LOW BACK PAIN WITH LEFT-SIDED SCIATICA: Status: RESOLVED | Noted: 2017-10-10 | Resolved: 2018-06-20

## 2018-06-20 PROBLEM — M54.16 LUMBAR RADICULOPATHY: Status: ACTIVE | Noted: 2018-03-28

## 2018-06-20 PROCEDURE — 1036F TOBACCO NON-USER: CPT | Performed by: FAMILY MEDICINE

## 2018-06-20 PROCEDURE — 3008F BODY MASS INDEX DOCD: CPT | Performed by: FAMILY MEDICINE

## 2018-06-20 PROCEDURE — 1101F PT FALLS ASSESS-DOCD LE1/YR: CPT | Performed by: FAMILY MEDICINE

## 2018-06-20 PROCEDURE — 3074F SYST BP LT 130 MM HG: CPT | Performed by: FAMILY MEDICINE

## 2018-06-20 PROCEDURE — 3078F DIAST BP <80 MM HG: CPT | Performed by: FAMILY MEDICINE

## 2018-06-20 PROCEDURE — 99214 OFFICE O/P EST MOD 30 MIN: CPT | Performed by: FAMILY MEDICINE

## 2018-06-20 RX ORDER — DIAZEPAM 5 MG/1
5 TABLET ORAL DAILY
COMMUNITY
Start: 2018-04-16 | End: 2018-06-20 | Stop reason: ALTCHOICE

## 2018-06-20 NOTE — PROGRESS NOTES
Assessment/Plan:    Actinic keratoses  Refer to Dermatology    Benign essential hypertension  Currently well controlled  Continue current medications  S/P lumbar laminectomy  Back pain is improving  He does still have some neuropathic type symptoms of his left leg which are stable  Diagnoses and all orders for this visit:    Need for zoster vaccine  -     Zoster Vac Recomb Adjuvanted ([address]) 50 MCG SUSR; Inject 0 5 mL into the shoulder, thigh, or buttocks once for 1 dose    Need for hepatitis C screening test  -     Hepatitis C antibody; Future    Benign essential hypertension  -     Comprehensive metabolic panel; Future  -     Lipid Panel with Direct LDL reflex; Future    HNP (herniated nucleus pulposus), lumbar    S/P lumbar laminectomy    Actinic keratoses  -     Ambulatory referral to Dermatology; Future    Prostate cancer screening  -     PSA, Total Screen; Future    Fatigue, unspecified type  -     TSH, 3rd generation with Free T4 reflex; Future  -     CBC and differential; Future    Other orders  -     Discontinue: diazepam (VALIUM) 5 mg tablet; Take 5 mg by mouth daily  -     ascorbic acid (VITAMIN C) 1000 MG tablet; Take 1,000 mg by mouth  -     Hm Colonoscopy          Subjective:      Patient ID: Rory Garcia is a 67 y o  male  Hypertension   This is a chronic problem  The current episode started more than 1 month ago  The problem has been gradually improving since onset  The problem is resistant  Associated symptoms include anxiety, malaise/fatigue and sweats  Pertinent negatives include no blurred vision, chest pain, headaches, neck pain, palpitations, peripheral edema, PND or shortness of breath  Agents associated with hypertension include decongestants  There are no known risk factors for coronary artery disease  He has a history of significant low back pain status post recent lumbar laminectomy in April    He notes his pain has improved significantly, but he continues to have some neuropathic symptoms with a numbness and tingling of his left lateral leg, but no severe pain  The following portions of the patient's history were reviewed and updated as appropriate: allergies, current medications, past family history, past medical history, past social history, past surgical history and problem list     Review of Systems   Constitutional: Positive for malaise/fatigue  Negative for appetite change, chills, fatigue, fever and unexpected weight change  HENT: Negative for trouble swallowing  Eyes: Negative for blurred vision and visual disturbance  Respiratory: Negative for cough, chest tightness, shortness of breath and wheezing  Cardiovascular: Negative for chest pain, palpitations and PND  Gastrointestinal: Negative for abdominal distention, abdominal pain, blood in stool, constipation and diarrhea  Endocrine: Negative for polyuria  Genitourinary: Negative for difficulty urinating and flank pain  Musculoskeletal: Negative for arthralgias, myalgias and neck pain  Skin: Negative for rash  Neurological: Negative for dizziness, light-headedness and headaches  Hematological: Negative for adenopathy  Does not bruise/bleed easily  Psychiatric/Behavioral: Negative for sleep disturbance  Objective:      /68   Pulse 64   Resp 18   Ht 6' 1" (1 854 m)   Wt 95 3 kg (210 lb)   SpO2 96%   BMI 27 71 kg/m²          Physical Exam   Constitutional: He is oriented to person, place, and time  He appears well-developed and well-nourished  No distress  HENT:   Head: Normocephalic  Eyes: Pupils are equal, round, and reactive to light  Neck: No tracheal deviation present  No thyromegaly present  Cardiovascular: Normal rate, regular rhythm and normal heart sounds  No murmur heard  Pulmonary/Chest: Effort normal  No respiratory distress  He has no wheezes  He has no rales  Abdominal: Soft  He exhibits no distension  There is no tenderness     Musculoskeletal: Normal range of motion  Neurological: He is alert and oriented to person, place, and time  No cranial nerve deficit  Skin: Skin is warm  He is not diaphoretic  There is erythema (Multiple actinic keratoses across his forehead)  Psychiatric: He has a normal mood and affect   Judgment and thought content normal

## 2018-06-21 NOTE — ASSESSMENT & PLAN NOTE
Back pain is improving  He does still have some neuropathic type symptoms of his left leg which are stable

## 2018-12-01 DIAGNOSIS — I10 HYPERTENSION, UNSPECIFIED TYPE: ICD-10-CM

## 2018-12-01 RX ORDER — AMLODIPINE BESYLATE 5 MG/1
TABLET ORAL
Qty: 90 TABLET | Refills: 0 | Status: CANCELLED | OUTPATIENT
Start: 2018-12-01

## 2018-12-03 DIAGNOSIS — I10 HYPERTENSION, UNSPECIFIED TYPE: ICD-10-CM

## 2018-12-03 RX ORDER — AMLODIPINE BESYLATE 5 MG/1
5 TABLET ORAL DAILY
Qty: 90 TABLET | Refills: 3 | Status: SHIPPED | OUTPATIENT
Start: 2018-12-03 | End: 2019-11-25 | Stop reason: SDUPTHER

## 2018-12-03 NOTE — TELEPHONE ENCOUNTER
I received a blank message from [facility]  Please see if he needs any prescriptions and discussed with the provider that sees him routinely

## 2018-12-06 PROCEDURE — 88305 TISSUE EXAM BY PATHOLOGIST: CPT | Performed by: PATHOLOGY

## 2018-12-07 ENCOUNTER — LAB REQUISITION (OUTPATIENT)
Dept: LAB | Facility: HOSPITAL | Age: 73
End: 2018-12-07
Payer: COMMERCIAL

## 2018-12-07 DIAGNOSIS — D22.5 MELANOCYTIC NEVI OF TRUNK: ICD-10-CM

## 2019-01-14 DIAGNOSIS — I10 BENIGN ESSENTIAL HTN: Primary | ICD-10-CM

## 2019-01-14 RX ORDER — LOSARTAN POTASSIUM 100 MG/1
TABLET ORAL DAILY
Qty: 90 TABLET | Refills: 1 | Status: SHIPPED | OUTPATIENT
Start: 2019-01-14 | End: 2019-07-18 | Stop reason: SDUPTHER

## 2019-01-21 PROCEDURE — 88305 TISSUE EXAM BY PATHOLOGIST: CPT | Performed by: PATHOLOGY

## 2019-01-23 ENCOUNTER — LAB REQUISITION (OUTPATIENT)
Dept: LAB | Facility: HOSPITAL | Age: 74
End: 2019-01-23
Payer: COMMERCIAL

## 2019-01-23 DIAGNOSIS — D22.5 MELANOCYTIC NEVI OF TRUNK: ICD-10-CM

## 2019-01-24 RX ORDER — FLUOROURACIL 50 MG/G
CREAM TOPICAL
Refills: 0 | COMMUNITY
Start: 2018-12-06 | End: 2019-03-13 | Stop reason: ALTCHOICE

## 2019-01-25 ENCOUNTER — OFFICE VISIT (OUTPATIENT)
Dept: FAMILY MEDICINE CLINIC | Facility: CLINIC | Age: 74
End: 2019-01-25
Payer: MEDICARE

## 2019-01-25 VITALS
SYSTOLIC BLOOD PRESSURE: 128 MMHG | RESPIRATION RATE: 18 BRPM | HEART RATE: 64 BPM | WEIGHT: 215 LBS | DIASTOLIC BLOOD PRESSURE: 72 MMHG | BODY MASS INDEX: 28.49 KG/M2 | OXYGEN SATURATION: 98 % | HEIGHT: 73 IN

## 2019-01-25 DIAGNOSIS — Z12.5 PROSTATE CANCER SCREENING: ICD-10-CM

## 2019-01-25 DIAGNOSIS — M54.16 LUMBAR RADICULOPATHY: ICD-10-CM

## 2019-01-25 DIAGNOSIS — Z98.890 S/P LUMBAR LAMINECTOMY: ICD-10-CM

## 2019-01-25 DIAGNOSIS — I10 BENIGN ESSENTIAL HYPERTENSION: Primary | ICD-10-CM

## 2019-01-25 DIAGNOSIS — R29.898 LEFT LEG WEAKNESS: ICD-10-CM

## 2019-01-25 PROBLEM — R07.9 CHEST PAIN: Status: RESOLVED | Noted: 2018-02-09 | Resolved: 2019-01-25

## 2019-01-25 PROCEDURE — 3074F SYST BP LT 130 MM HG: CPT | Performed by: FAMILY MEDICINE

## 2019-01-25 PROCEDURE — 3078F DIAST BP <80 MM HG: CPT | Performed by: FAMILY MEDICINE

## 2019-01-25 PROCEDURE — 99214 OFFICE O/P EST MOD 30 MIN: CPT | Performed by: FAMILY MEDICINE

## 2019-01-25 NOTE — ASSESSMENT & PLAN NOTE
I am going to have him try physical therapy  Consider EMG, although I am not sure to be of much clinical benefit it may help us plan for the future

## 2019-01-25 NOTE — PATIENT INSTRUCTIONS
Obesity   AMBULATORY CARE:   Obesity  is when your body mass index (BMI) is greater than 30  Your healthcare provider will use your height and weight to measure your BMI  The risks of obesity include  many health problems, such as injuries or physical disability  You may need tests to check for the following:  · Diabetes     · High blood pressure or high cholesterol     · Heart disease     · Gallbladder or liver disease     · Cancer of the colon, breast, prostate, liver, or kidney     · Sleep apnea     · Arthritis or gout  Seek care immediately if:   · You have a severe headache, confusion, or difficulty speaking  · You have weakness on one side of your body  · You have chest pain, sweating, or shortness of breath  Contact your healthcare provider if:   · You have symptoms of gallbladder or liver disease, such as pain in your upper abdomen  · You have knee or hip pain and discomfort while walking  · You have symptoms of diabetes, such as intense hunger and thirst, and frequent urination  · You have symptoms of sleep apnea, such as snoring or daytime sleepiness  · You have questions or concerns about your condition or care  Treatment for obesity  focuses on helping you lose weight to improve your health  Even a small decrease in BMI can reduce the risk for many health problems  Your healthcare provider will help you set a weight-loss goal   · Lifestyle changes  are the first step in treating obesity  These include making healthy food choices and getting regular physical activity  Your healthcare provider may suggest a weight-loss program that involves coaching, education, and therapy  · Medicine  may help you lose weight when it is used with a healthy diet and physical activity  · Surgery  can help you lose weight if you are very obese and have other health problems  There are several types of weight-loss surgery  Ask your healthcare provider for more information    Be successful losing weight:   · Set small, realistic goals  An example of a small goal is to walk for 20 minutes 5 days a week  Anther goal is to lose 5% of your body weight  · Tell friends, family members, and coworkers about your goals  and ask for their support  Ask a friend to lose weight with you, or join a weight-loss support group  · Identify foods or triggers that may cause you to overeat , and find ways to avoid them  Remove tempting high-calorie foods from your home and workplace  Place a bowl of fresh fruit on your kitchen counter  If stress causes you to eat, then find other ways to cope with stress  · Keep a diary to track what you eat and drink  Also write down how many minutes of physical activity you do each day  Weigh yourself once a week and record it in your diary  Eating changes: You will need to eat 500 to 1,000 fewer calories each day than you currently eat to lose 1 to 2 pounds a week  The following changes will help you cut calories:  · Eat smaller portions  Use small plates, no larger than 9 inches in diameter  Fill your plate half full of fruits and vegetables  Measure your food using measuring cups until you know what a serving size looks like  · Eat 3 meals and 1 or 2 snacks each day  Plan your meals in advance  Jon Meli and eat at home most of the time  Eat slowly  · Eat fruits and vegetables at every meal   They are low in calories and high in fiber, which makes you feel full  Do not add butter, margarine, or cream sauce to vegetables  Use herbs to season steamed vegetables  · Eat less fat and fewer fried foods  Eat more baked or grilled chicken and fish  These protein sources are lower in calories and fat than red meat  Limit fast food  Dress your salads with olive oil and vinegar instead of bottled dressing  · Limit the amount of sugar you eat  Do not drink sugary beverages  Limit alcohol  Activity changes:  Physical activity is good for your body in many ways   It helps you burn calories and build strong muscles  It decreases stress and depression, and improves your mood  It can also help you sleep better  Talk to your healthcare provider before you begin an exercise program   · Exercise for at least 30 minutes 5 days a week  Start slowly  Set aside time each day for physical activity that you enjoy and that is convenient for you  It is best to do both weight training and an activity that increases your heart rate, such as walking, bicycling, or swimming  · Find ways to be more active  Do yard work and housecleaning  Walk up the stairs instead of using elevators  Spend your leisure time going to events that require walking, such as outdoor festivals or fairs  This extra physical activity can help you lose weight and keep it off  Follow up with your healthcare provider as directed: You may need to meet with a dietitian  Write down your questions so you remember to ask them during your visits  © 2017 [employer] Information is for End User's use only and may not be sold, redistributed or otherwise used for commercial purposes  All illustrations and images included in CareNotes® are the copyrighted property of [employer]  or Jesús Manjarrez  The above information is an  only  It is not intended as medical advice for individual conditions or treatments  Talk to your doctor, nurse or pharmacist before following any medical regimen to see if it is safe and effective for you

## 2019-01-25 NOTE — PROGRESS NOTES
Assessment/Plan:       Problem List Items Addressed This Visit        Cardiovascular and Mediastinum    Benign essential hypertension - Primary    Relevant Orders    Lipid Panel with Direct LDL reflex    Comprehensive metabolic panel       Nervous and Auditory    Lumbar radiculopathy    Relevant Orders    Ambulatory referral to Physical Therapy    Left leg weakness     I am going to have him try physical therapy  Consider EMG, although I am not sure to be of much clinical benefit it may help us plan for the future  Relevant Orders    Ambulatory referral to Physical Therapy       Other    S/P lumbar laminectomy      Other Visit Diagnoses     Prostate cancer screening        Relevant Orders    PSA, Total Screen            Subjective:      Patient ID: Cherelle Shipley is a 68 y o  male  HPI patient presents today for follow-up for chronic health issues  Unfortunately, he continues to experience a gait dysfunction and some difficulty arising from chairs ever since his lumbar laminectomy in April 2018  He has some persistent tingling and numbness of his left anterior leg consistent with an L3 neuropathy  He has not fallen but feels he gets close on occasion  His history of hypertension and denies chest pain, shortness of breath, palpitations or lightheadedness  He has a history of  actinic keratoses and is status post 5 fluorouracil treatment  His forehead quite erythematous at this time  He had a lesion removed from his back and his dermatologist as suggested I do wound check on it today  It was a dysplastic nevus  The following portions of the patient's history were reviewed and updated as appropriate: allergies, current medications, past family history, past medical history, past social history, past surgical history and problem list     Review of Systems   Constitutional: Negative for appetite change, chills, fatigue, fever and unexpected weight change  HENT: Negative for trouble swallowing  Eyes: Negative for visual disturbance  Respiratory: Negative for cough, chest tightness, shortness of breath and wheezing  Cardiovascular: Negative for chest pain  Gastrointestinal: Negative for abdominal distention, abdominal pain, blood in stool, constipation and diarrhea  Endocrine: Negative for polyuria  Genitourinary: Negative for difficulty urinating and flank pain  Musculoskeletal: Negative for arthralgias and myalgias  Skin: Negative for rash  Neurological: Negative for dizziness and light-headedness  Hematological: Negative for adenopathy  Does not bruise/bleed easily  Psychiatric/Behavioral: Negative for sleep disturbance  Objective:      /72   Pulse 64   Resp 18   Ht 6' 1" (1 854 m)   Wt 97 5 kg (215 lb)   SpO2 98%   BMI 28 37 kg/m²          Physical Exam   Constitutional: He is oriented to person, place, and time  He appears well-developed and well-nourished  No distress  HENT:   Head: Normocephalic  Eyes: Pupils are equal, round, and reactive to light  Right eye exhibits no discharge  Left eye exhibits no discharge  Neck: No tracheal deviation present  No thyromegaly present  Cardiovascular: Normal rate, regular rhythm and normal heart sounds  No murmur heard  Pulmonary/Chest: Effort normal  No respiratory distress  He has no wheezes  He has no rales  Abdominal: Soft  He exhibits no distension  There is no tenderness  Musculoskeletal: Normal range of motion  He exhibits no edema  Lymphadenopathy:     He has no cervical adenopathy  Neurological: He is alert and oriented to person, place, and time  No cranial nerve deficit  Skin: Skin is warm  He is not diaphoretic  No erythema  I removed is bandaged and check of his wound  He has a 1 x 1 cm erythematous lesion that looks free from infection or discharge  Psychiatric: He has a normal mood and affect   Judgment and thought content normal          Uziel Wright MD    BMI Counseling: Body mass index is 28 37 kg/m²  Discussed the patient's BMI with him  The BMI is above average  BMI counseling and education was provided to the patient  Nutrition recommendations include reducing portion sizes, decreasing overall calorie intake and 3-5 servings of fruits/vegetables daily  Exercise recommendations include moderate aerobic physical activity for 150 minutes/week

## 2019-02-07 ENCOUNTER — TRANSCRIBE ORDERS (OUTPATIENT)
Dept: ADMINISTRATIVE | Age: 74
End: 2019-02-07

## 2019-02-07 ENCOUNTER — EVALUATION (OUTPATIENT)
Dept: PHYSICAL THERAPY | Age: 74
End: 2019-02-07
Payer: COMMERCIAL

## 2019-02-07 ENCOUNTER — APPOINTMENT (OUTPATIENT)
Dept: LAB | Age: 74
End: 2019-02-07
Payer: COMMERCIAL

## 2019-02-07 DIAGNOSIS — Z12.5 PROSTATE CANCER SCREENING: ICD-10-CM

## 2019-02-07 DIAGNOSIS — M54.16 LUMBAR RADICULOPATHY: Primary | ICD-10-CM

## 2019-02-07 DIAGNOSIS — R29.898 LEFT LEG WEAKNESS: ICD-10-CM

## 2019-02-07 DIAGNOSIS — I10 BENIGN ESSENTIAL HYPERTENSION: ICD-10-CM

## 2019-02-07 LAB
ALBUMIN SERPL BCP-MCNC: 3.9 G/DL (ref 3.5–5)
ALP SERPL-CCNC: 80 U/L (ref 46–116)
ALT SERPL W P-5'-P-CCNC: 23 U/L (ref 12–78)
ANION GAP SERPL CALCULATED.3IONS-SCNC: 6 MMOL/L (ref 4–13)
AST SERPL W P-5'-P-CCNC: 19 U/L (ref 5–45)
BILIRUB SERPL-MCNC: 0.9 MG/DL (ref 0.2–1)
BUN SERPL-MCNC: 18 MG/DL (ref 5–25)
CALCIUM SERPL-MCNC: 9.3 MG/DL (ref 8.3–10.1)
CHLORIDE SERPL-SCNC: 106 MMOL/L (ref 100–108)
CHOLEST SERPL-MCNC: 195 MG/DL (ref 50–200)
CO2 SERPL-SCNC: 28 MMOL/L (ref 21–32)
CREAT SERPL-MCNC: 1.31 MG/DL (ref 0.6–1.3)
GFR SERPL CREATININE-BSD FRML MDRD: 54 ML/MIN/1.73SQ M
GLUCOSE P FAST SERPL-MCNC: 97 MG/DL (ref 65–99)
HDLC SERPL-MCNC: 51 MG/DL (ref 40–60)
LDLC SERPL CALC-MCNC: 126 MG/DL (ref 0–100)
POTASSIUM SERPL-SCNC: 4.8 MMOL/L (ref 3.5–5.3)
PROT SERPL-MCNC: 7.4 G/DL (ref 6.4–8.2)
PSA SERPL-MCNC: 1 NG/ML (ref 0–4)
SODIUM SERPL-SCNC: 140 MMOL/L (ref 136–145)
TRIGL SERPL-MCNC: 89 MG/DL

## 2019-02-07 PROCEDURE — 80061 LIPID PANEL: CPT

## 2019-02-07 PROCEDURE — 36415 COLL VENOUS BLD VENIPUNCTURE: CPT

## 2019-02-07 PROCEDURE — 97162 PT EVAL MOD COMPLEX 30 MIN: CPT | Performed by: PHYSICAL THERAPIST

## 2019-02-07 PROCEDURE — G0103 PSA SCREENING: HCPCS

## 2019-02-07 PROCEDURE — 80053 COMPREHEN METABOLIC PANEL: CPT

## 2019-02-07 NOTE — PROGRESS NOTES
PT Evaluation     Today's date: 2019  Patient name: Erlin Pena  : 1945  MRN: [id number]  Referring provider: Talia Reaves , *  Dx:   Encounter Diagnosis     ICD-10-CM    1  Lumbar radiculopathy M54 [id number] Ambulatory referral to Physical Therapy   2  Left leg weakness [id number] Ambulatory referral to Physical Therapy                  Assessment  Assessment details: Patient seen for PT evaluation for lumbar radiculopathy  PT concerned for patient's atrophy in L thigh compared to R thigh  Patient with significant balance deficits on L compared to R, decreased L LE strength, reflexes within normal limits, no change in L proximal lateral thigh numbness with ROM nor with repeated extension  Patient shifted towards R in standing (same as prior to surgery, possibly related to h/o fall off a ladder in )  PT able to correct for shift, patient unable to maintain throughout examination  PT administered HEP for strengthening, patient already exercising regularly at the gym and has noticed persistent atrophy in thigh  PT recommending further imaging, possible MRI vs EMG, to further diagnose and assess possible nerve damage as patient's atrophy started 5 months after surgery and has progressively worsened over time  Patient has not changed his exercise program, has continued to swim/strengthen regularly and no reduction in atrophy in thigh  PT recommended patient to follow up with MD, possible referral to surgeon pending results from imaging/testing- if applicable  PT recommending to hold therapy at this time pending insurance approval for diagnostic imaging/testing    Impairments: abnormal gait, abnormal or restricted ROM, activity intolerance, impaired balance, impaired physical strength, lacks appropriate home exercise program and pain with function  Functional limitations: Patient reports moderate limitation with stairclimbing, with IADLs - ie difficulty with dressing (standing on one leg), limitations with current fitness program at the gym  Understanding of Dx/Px/POC: good  Goals  Goals to be set if/when patient returns to PT    Plan  Treatment plan discussed with: patient        Subjective Evaluation    History of Present Illness  Mechanism of injury: Patient reports h/o 3 disc herniations in 10/2017  Patient underwent microdiscectomy, laminectomy, decompression in 4/2018, and has persistent numbness and progressive weakness in the L LE since the surgery  Patient does report surgeon saying the sciatic nerve was damaged from being compressed for so long, unsure if the nerve would be able to fully regenerate  Patient noticed increase in weakness in L LE starting September 2018; patient needed to use SPC to walk longer distances  Patient currently reports no back pain; only concerned about weakness in LE and numbness  Reports numbness at proximal lateral thigh  Pain  No pain reported  Aggravating factors: sitting, standing, walking, stair climbing, lifting and overhead activity  Progression: no change    Social Support  Steps to enter house: yes  Stairs in house: yes   Lives in: multiple-level home  Lives with: spouse      Diagnostic Tests  No diagnostic tests performed  Treatments  No previous or current treatments  Patient Goals  Patient goals for therapy: decreased pain, increased motion, improved balance, increased strength and return to sport/leisure activities          Objective     Static Posture     Lumbar Spine   Decreased lordosis  Lumbar spine (Right): Shifted  Pelvis   Pelvis (Right): Shifted       Neurological Testing     Sensation     Lumbar   Left   Paresthesia: light touch    Right   Intact: light touch    Reflexes   Left   Patellar (L4): normal (2+)  Clonus sign: negative    Right   Patellar (L4): normal (2+)  Clonus sign: negative    Active Range of Motion     Lumbar   Flexion: 72 degrees  Restriction level: minimal  Extension: 20 degrees  Restriction level: moderate  Left lateral flexion: 18 degrees    Restriction level: moderate  Right lateral flexion: 20 degrees  Restriction level: moderate  Left rotation: 25 degrees  Restriction level: minimal  Right rotation: 15 degrees  Restriction level: moderate    Additional Active Range of Motion Details  Reports numbness tingling in proximal R upper thigh  No change with ROM assessment  Passive Range of Motion     Lumbar   Left lateral flexion:  Restriction level: moderate  Right lateral flexion:  Restriction level: moderate  Left rotation:  Restriction level: minimal  Right rotation:  Restriction level: moderate    Strength/Myotome Testing     Left Hip   Planes of Motion   Flexion: 3+  Extension: 4-  Abduction: 3+  Adduction: 3+    Right Hip   Planes of Motion   Flexion: 5  Extension: 4+  Abduction: 5  Adduction: 5    Left Knee   Flexion: 3+  Extension: 3+    Right Knee   Flexion: 5  Extension: 5    Tests     Lumbar     Left   Negative crossed SLR, passive SLR and slump test      Right   Negative crossed SLR, passive SLR and slump test      Ambulation     Observational Gait   Gait: antalgic   Right stance time, right swing time and right step length within functional limits  Decreased walking speed, stride length, left stance time, left swing time and left step length     Left foot contact pattern: foot flat  Right foot contact pattern: heel to toe  Left arm swing: decreased  Right arm swing: decreased      Flowsheet Rows      Most Recent Value   PT/OT G-Codes   Current Score  49   Projected Score  59

## 2019-02-11 ENCOUNTER — TELEPHONE (OUTPATIENT)
Dept: FAMILY MEDICINE CLINIC | Facility: CLINIC | Age: 74
End: 2019-02-11

## 2019-02-11 DIAGNOSIS — R79.89 CREATININE ELEVATION: Primary | ICD-10-CM

## 2019-02-11 NOTE — TELEPHONE ENCOUNTER
Had [facility] and she recommended that he get an MRI  Asking if you will order this?

## 2019-02-14 DIAGNOSIS — Z98.890 S/P LUMBAR LAMINECTOMY: ICD-10-CM

## 2019-02-14 DIAGNOSIS — M54.16 LUMBAR RADICULOPATHY: Primary | ICD-10-CM

## 2019-02-14 DIAGNOSIS — R29.898 LEFT LEG WEAKNESS: ICD-10-CM

## 2019-02-28 ENCOUNTER — TELEPHONE (OUTPATIENT)
Dept: FAMILY MEDICINE CLINIC | Facility: CLINIC | Age: 74
End: 2019-02-28

## 2019-02-28 NOTE — TELEPHONE ENCOUNTER
Jenna Browning with [facility] called stating MRI Lumbar spine is Approved      Garrick Marie #[id number]  Valid: 2/27/19-3/29/19

## 2019-03-04 ENCOUNTER — APPOINTMENT (EMERGENCY)
Dept: RADIOLOGY | Facility: HOSPITAL | Age: 74
DRG: 312 | End: 2019-03-04
Payer: COMMERCIAL

## 2019-03-04 ENCOUNTER — HOSPITAL ENCOUNTER (INPATIENT)
Facility: HOSPITAL | Age: 74
LOS: 1 days | Discharge: HOME | DRG: 312 | End: 2019-03-05
Attending: EMERGENCY MEDICINE | Admitting: GENERAL PRACTICE
Payer: COMMERCIAL

## 2019-03-04 ENCOUNTER — HOSPITAL ENCOUNTER (OUTPATIENT)
Dept: RADIOLOGY | Facility: HOSPITAL | Age: 74
Discharge: HOME | End: 2019-03-04
Payer: COMMERCIAL

## 2019-03-04 DIAGNOSIS — R56.9 SEIZURE-LIKE ACTIVITY (HCC): Primary | ICD-10-CM

## 2019-03-04 DIAGNOSIS — Z98.890 S/P LUMBAR LAMINECTOMY: ICD-10-CM

## 2019-03-04 DIAGNOSIS — M54.16 LUMBAR RADICULOPATHY: ICD-10-CM

## 2019-03-04 DIAGNOSIS — R29.898 LEFT LEG WEAKNESS: ICD-10-CM

## 2019-03-04 DIAGNOSIS — R55 SYNCOPE: ICD-10-CM

## 2019-03-04 PROBLEM — M25.512 ACUTE PAIN OF LEFT SHOULDER: Status: ACTIVE | Noted: 2019-03-04

## 2019-03-04 LAB
ALBUMIN SERPL BCP-MCNC: 3.8 G/DL (ref 3.5–5)
ALP SERPL-CCNC: 75 U/L (ref 46–116)
ALT SERPL W P-5'-P-CCNC: 19 U/L (ref 12–78)
ANION GAP SERPL CALCULATED.3IONS-SCNC: 8 MMOL/L (ref 4–13)
AST SERPL W P-5'-P-CCNC: 22 U/L (ref 5–45)
BASOPHILS # BLD AUTO: 0.05 THOUSANDS/ΜL (ref 0–0.1)
BASOPHILS NFR BLD AUTO: 0 % (ref 0–1)
BILIRUB SERPL-MCNC: 1 MG/DL (ref 0.2–1)
BILIRUB UR QL STRIP: ABNORMAL
BUN SERPL-MCNC: 18 MG/DL (ref 5–25)
CALCIUM SERPL-MCNC: 8.9 MG/DL (ref 8.3–10.1)
CHLORIDE SERPL-SCNC: 108 MMOL/L (ref 100–108)
CLARITY UR: CLEAR
CO2 SERPL-SCNC: 24 MMOL/L (ref 21–32)
COLOR UR: YELLOW
COLOR, POC: NORMAL
CREAT SERPL-MCNC: 1.34 MG/DL (ref 0.6–1.3)
EOSINOPHIL # BLD AUTO: 0.09 THOUSAND/ΜL (ref 0–0.61)
EOSINOPHIL NFR BLD AUTO: 1 % (ref 0–6)
ERYTHROCYTE [DISTWIDTH] IN BLOOD BY AUTOMATED COUNT: 13.4 % (ref 11.6–15.1)
GFR SERPL CREATININE-BSD FRML MDRD: 52 ML/MIN/1.73SQ M
GLUCOSE SERPL-MCNC: 110 MG/DL (ref 65–140)
GLUCOSE UR STRIP-MCNC: NEGATIVE MG/DL
HCT VFR BLD AUTO: 41.9 % (ref 36.5–49.3)
HGB BLD-MCNC: 13.7 G/DL (ref 12–17)
HGB UR QL STRIP.AUTO: NEGATIVE
IMM GRANULOCYTES # BLD AUTO: 0.05 THOUSAND/UL (ref 0–0.2)
IMM GRANULOCYTES NFR BLD AUTO: 0 % (ref 0–2)
KETONES UR STRIP-MCNC: ABNORMAL MG/DL
LEUKOCYTE ESTERASE UR QL STRIP: NEGATIVE
LYMPHOCYTES # BLD AUTO: 1.73 THOUSANDS/ΜL (ref 0.6–4.47)
LYMPHOCYTES NFR BLD AUTO: 13 % (ref 14–44)
MCH RBC QN AUTO: 29.5 PG (ref 26.8–34.3)
MCHC RBC AUTO-ENTMCNC: 32.7 G/DL (ref 31.4–37.4)
MCV RBC AUTO: 90 FL (ref 82–98)
MONOCYTES # BLD AUTO: 0.86 THOUSAND/ΜL (ref 0.17–1.22)
MONOCYTES NFR BLD AUTO: 7 % (ref 4–12)
NEUTROPHILS # BLD AUTO: 10.45 THOUSANDS/ΜL (ref 1.85–7.62)
NEUTS SEG NFR BLD AUTO: 79 % (ref 43–75)
NITRITE UR QL STRIP: NEGATIVE
NRBC BLD AUTO-RTO: 0 /100 WBCS
PH UR STRIP.AUTO: 5 [PH] (ref 4.5–8)
PLATELET # BLD AUTO: 315 THOUSANDS/UL (ref 149–390)
PMV BLD AUTO: 11.1 FL (ref 8.9–12.7)
POTASSIUM SERPL-SCNC: 4.1 MMOL/L (ref 3.5–5.3)
PROT SERPL-MCNC: 6.9 G/DL (ref 6.4–8.2)
PROT UR STRIP-MCNC: NEGATIVE MG/DL
RBC # BLD AUTO: 4.64 MILLION/UL (ref 3.88–5.62)
SODIUM SERPL-SCNC: 140 MMOL/L (ref 136–145)
SP GR UR STRIP.AUTO: >=1.03 (ref 1–1.03)
TROPONIN I SERPL-MCNC: <0.02 NG/ML
UROBILINOGEN UR QL STRIP.AUTO: 0.2 E.U./DL
WBC # BLD AUTO: 13.23 THOUSAND/UL (ref 4.31–10.16)

## 2019-03-04 PROCEDURE — 80053 COMPREHEN METABOLIC PANEL: CPT | Performed by: EMERGENCY MEDICINE

## 2019-03-04 PROCEDURE — 99285 EMERGENCY DEPT VISIT HI MDM: CPT

## 2019-03-04 PROCEDURE — 84484 ASSAY OF TROPONIN QUANT: CPT | Performed by: EMERGENCY MEDICINE

## 2019-03-04 PROCEDURE — 81003 URINALYSIS AUTO W/O SCOPE: CPT

## 2019-03-04 PROCEDURE — 81002 URINALYSIS NONAUTO W/O SCOPE: CPT | Performed by: EMERGENCY MEDICINE

## 2019-03-04 PROCEDURE — 71046 X-RAY EXAM CHEST 2 VIEWS: CPT

## 2019-03-04 PROCEDURE — 99220 PR INITIAL OBSERVATION CARE/DAY 70 MINUTES: CPT | Performed by: GENERAL PRACTICE

## 2019-03-04 PROCEDURE — 72158 MRI LUMBAR SPINE W/O & W/DYE: CPT

## 2019-03-04 PROCEDURE — 73030 X-RAY EXAM OF SHOULDER: CPT

## 2019-03-04 PROCEDURE — 93005 ELECTROCARDIOGRAM TRACING: CPT

## 2019-03-04 PROCEDURE — 85025 COMPLETE CBC W/AUTO DIFF WBC: CPT | Performed by: EMERGENCY MEDICINE

## 2019-03-04 PROCEDURE — A9585 GADOBUTROL INJECTION: HCPCS | Performed by: FAMILY MEDICINE

## 2019-03-04 PROCEDURE — 36415 COLL VENOUS BLD VENIPUNCTURE: CPT | Performed by: EMERGENCY MEDICINE

## 2019-03-04 PROCEDURE — 70450 CT HEAD/BRAIN W/O DYE: CPT

## 2019-03-04 RX ORDER — SODIUM PHOSPHATE,MONO-DIBASIC 19G-7G/118
500 ENEMA (ML) RECTAL DAILY
Status: DISCONTINUED | OUTPATIENT
Start: 2019-03-05 | End: 2019-03-05 | Stop reason: HOSPADM

## 2019-03-04 RX ORDER — LANOLIN ALCOHOL/MO/W.PET/CERES
1 CREAM (GRAM) TOPICAL DAILY
Status: DISCONTINUED | OUTPATIENT
Start: 2019-03-05 | End: 2019-03-04 | Stop reason: CLARIF

## 2019-03-04 RX ORDER — AMLODIPINE BESYLATE 5 MG/1
5 TABLET ORAL DAILY
Status: DISCONTINUED | OUTPATIENT
Start: 2019-03-05 | End: 2019-03-05 | Stop reason: HOSPADM

## 2019-03-04 RX ORDER — ASCORBIC ACID 500 MG
1000 TABLET ORAL DAILY
Status: DISCONTINUED | OUTPATIENT
Start: 2019-03-05 | End: 2019-03-05 | Stop reason: HOSPADM

## 2019-03-04 RX ORDER — ACETAMINOPHEN 325 MG/1
650 TABLET ORAL EVERY 6 HOURS PRN
Status: DISCONTINUED | OUTPATIENT
Start: 2019-03-04 | End: 2019-03-05 | Stop reason: HOSPADM

## 2019-03-04 RX ORDER — LOSARTAN POTASSIUM 50 MG/1
100 TABLET ORAL DAILY
Status: DISCONTINUED | OUTPATIENT
Start: 2019-03-05 | End: 2019-03-05 | Stop reason: HOSPADM

## 2019-03-04 RX ORDER — LORATADINE 10 MG/1
10 TABLET ORAL DAILY
Status: DISCONTINUED | OUTPATIENT
Start: 2019-03-05 | End: 2019-03-05 | Stop reason: HOSPADM

## 2019-03-04 RX ORDER — ACETAMINOPHEN 325 MG/1
650 TABLET ORAL ONCE
Status: COMPLETED | OUTPATIENT
Start: 2019-03-04 | End: 2019-03-04

## 2019-03-04 RX ADMIN — ACETAMINOPHEN 650 MG: 325 TABLET ORAL at 16:15

## 2019-03-04 RX ADMIN — GADOBUTROL 10 ML: 604.72 INJECTION INTRAVENOUS at 11:57

## 2019-03-04 NOTE — ED NOTES
Patient dinner tray ordered at this time   Paul Colmenares in nutritional services aware to send tray to p-Naveen Kaminski RN  03/04/19 [id number]

## 2019-03-04 NOTE — PROGRESS NOTES
Medical emergency called in MRI  MRI tech stated when MRI completed patient taken out of MRI responded appropriately but felt light headed, left arm shaking followed by full body tremors with eyes rolled back for about 30 seconds  When I arrived, was on MRI table outside of MRI room - patient AxO4x, NIH score 0, VSS  HR 80 /82 SpO2 98  Patient stated passing out earlier today when [place] snow  Transferred with ED staff to ED  Wife updated

## 2019-03-04 NOTE — PROGRESS NOTES
03/04/19 Reese Hull Involvement Patient active with [employer]   Spiritual Beliefs/Perceptions   Concept of God Accepting   Stress Factors   Family Stress Factors Health changes   Coping Responses   Family Coping Anxiety;Open/discussion   Plan of Care   Comments Facilitated story telling, listened empathetically, offered hospitality, cultivated relationship of care and support   Assessment Completed by: Unit visit

## 2019-03-04 NOTE — ED ATTENDING ATTESTATION
Jair Mojica MD, saw and evaluated the patient  I have discussed the patient with the resident/non-physician practitioner and agree with the resident's/non-physician practitioner's findings, Plan of Care, and MDM as documented in the resident's/non-physician practitioner's note, except where noted  All available labs and Radiology studies were reviewed  I was present for key portions of any procedure(s) performed by the resident/non-physician practitioner and I was immediately available to provide assistance  At this point I agree with the current assessment done in the Emergency Department  I have conducted an independent evaluation of this patient a history and physical is as follows:    12-year-old man had onset of lightheadedness while shoveling snow this morning with subsequent syncopal event  No chest pain, back pain, shortness of breath, headache, or any other symptoms at that time  Injured left anterior shoulder in the fall  No neck pain or head pain  Patient was in MRI for lumbar spine MRI which happened after this syncopal event  He was noted to have episode of shaking in the MRI although he states he did not ever lose consciousness  Was also feeling lightheaded at that time  At this time patient is asymptomatic  Heart regular rate and rhythm, no murmurs rubs or gallops  Lungs clear to auscultation bilaterally  Abdomen soft, nontender, nondistended  Nonfocal neuro  There is tenderness over the distal left clavicle  Neurovascular intact  Plan EKG, imaging, labs, admission given the concern for dysrhythmia or other cardiac etiology        Critical Care Time  Procedures

## 2019-03-04 NOTE — ED PROVIDER NOTES
History  Chief Complaint   Patient presents with    Seizure - New Onset     Patient was a medical emergency at MRI  Per patient he has just finished MRI with contrast, sat up felt lightheaded and had seizure like activity for a few seconds  Patient states he feels better now  Patient states he has not had contrast dye before  80-year-old male with history of hypertension presenting to the emergency department after a seizure like activity while receiving MRI scan  Patient states today he was getting an MRI due to low back pain which radiates down his left lower leg and afterwards he sat up, felt mildly lightheaded and the next thing he remembers is being in a rooms read by many people  He reportedly had seizure-like activity with 1 upper extremity shaking then generalized shaking of all body per nursing note  Patient reportedly had a syncopal episode earlier in the day after he shoveling his driveway he became lightheaded outside and fell forward onto his left shoulder  Patient was able to crawl to his truck and pull himself up afterwards he denies striking his head he denies any loss of consciousness he feels otherwise well  Patient denies any history of seizures or seizure-like activity and has no other medical problems besides hypertension  Patient has no complaints of chest pain shortness of breath nausea vomiting diaphoresis abdominal pain constipation or diarrhea and feels otherwise well  Remaining ROS negative  History provided by:  Patient and medical records   used: No    Syncope   Episode history:  Single  Most recent episode:   Today  Timing:  Constant  Progression:  Resolved  Chronicity:  New  Context: exertion    Witnessed: no    Relieved by:  Nothing  Worsened by:  Nothing  Ineffective treatments:  None tried  Associated symptoms: seizures    Associated symptoms: no chest pain, no fever, no headaches, no nausea, no shortness of breath, no vomiting and no weakness Prior to Admission Medications   Prescriptions Last Dose Informant Patient Reported? Taking? DAILY MULTIPLE VITAMINS tablet 3/3/2019 at Unknown time Self Yes Yes   Sig: Take 1 tablet by mouth daily   amLODIPine (NORVASC) 5 mg tablet 3/3/2019 at Unknown time Self No Yes   Sig: Take 1 tablet (5 mg total) by mouth daily   ascorbic acid (VITAMIN C) 1000 MG tablet 3/3/2019 at Unknown time Self Yes Yes   Sig: Take 1,000 mg by mouth   cetirizine (ZyrTEC) 10 mg tablet 3/3/2019 at Unknown time Self Yes Yes   Sig: Take 1 tablet by mouth   fluorouracil (EFUDEX) 5 % cream  Self Yes No   Sig: NAM TO SCALP AREA BID FOR 3 WEEKS   glucosamine-chondroitin 500-400 MG tablet 3/3/2019 at Unknown time Self Yes Yes   Sig: Take 1 tablet by mouth daily   losartan (COZAAR) 100 MG tablet 3/3/2019 at Unknown time Self No Yes   Sig: TAKE 1 TABLET BY MOUTH DAILY      Facility-Administered Medications: None       Past Medical History:   Diagnosis Date    Benign essential hypertension     Broken ribs     Chest pain        Past Surgical History:   Procedure Laterality Date    CONTINENT APPENDICOSTOMY      LUMBAR LAMINECTOMY Bilateral 04/2018    TONSILLECTOMY         Family History   Problem Relation Age of Onset    Colon cancer Mother     Cancer Father     Thyroid cancer Father     Other Father         MOTOR VEHICLE TRAFFIC ACCIDENT    Ovarian cancer Sister      I have reviewed and agree with the history as documented  Social History     Tobacco Use    Smoking status: Former Smoker    Smokeless tobacco: Never Used   Substance Use Topics    Alcohol use: Yes     Comment: SOCIAL    Drug use: Not Currently        Review of Systems   Constitutional: Negative for chills, fatigue and fever  HENT: Negative for sore throat  Eyes: Negative for visual disturbance  Respiratory: Negative for shortness of breath  Cardiovascular: Positive for syncope  Negative for chest pain     Gastrointestinal: Negative for abdominal pain, constipation, diarrhea, nausea and vomiting  Genitourinary: Negative for difficulty urinating, dysuria and hematuria  Musculoskeletal: Negative for arthralgias  Skin: Negative for rash  Neurological: Positive for seizures and syncope  Negative for weakness and headaches  Hematological: Negative for adenopathy  Psychiatric/Behavioral: Negative for agitation and behavioral problems  All other systems reviewed and are negative  Physical Exam  ED Triage Vitals [03/04/19 1220]   Temperature Pulse Respirations Blood Pressure SpO2   97 9 °F (36 6 °C) 65 19 141/76 99 %      Temp Source Heart Rate Source Patient Position - Orthostatic VS BP Location FiO2 (%)   Oral Monitor Sitting Right arm --      Pain Score       No Pain           Orthostatic Vital Signs  Vitals:    03/04/19 1230 03/04/19 1330 03/04/19 1511 03/04/19 1530   BP: 141/76 144/73 140/76 148/72   Pulse: 66 64 65 62   Patient Position - Orthostatic VS:   Lying Sitting       Physical Exam   Constitutional: He is oriented to person, place, and time  He appears well-developed and well-nourished  HENT:   Head: Normocephalic and atraumatic  Eyes: Conjunctivae and EOM are normal  No scleral icterus  Neck: Normal range of motion  Neck supple  Cardiovascular: Normal rate and regular rhythm  No murmur heard  Pulmonary/Chest: Effort normal and breath sounds normal    Abdominal: Soft  Bowel sounds are normal  There is no tenderness  Musculoskeletal: Normal range of motion  He exhibits tenderness  Shoulder pain     Neurological: He is alert and oriented to person, place, and time  Skin: Skin is warm and dry  Psychiatric: He has a normal mood and affect  His behavior is normal    Nursing note and vitals reviewed        ED Medications  Medications   acetaminophen (TYLENOL) tablet 650 mg (650 mg Oral Given 3/4/19 1615)       Diagnostic Studies  Results Reviewed     Procedure Component Value Units Date/Time    Troponin I [[id number]]  (Normal) Collected:  03/04/19 1307    Lab Status:  Final result Specimen:  Blood from Arm, Left Updated:  03/04/19 1347     Troponin I <0 02 ng/mL     Comprehensive metabolic panel [[id number]]  (Abnormal) Collected:  03/04/19 1307    Lab Status:  Final result Specimen:  Blood from Arm, Left Updated:  03/04/19 1346     Sodium 140 mmol/L      Potassium 4 1 mmol/L      Chloride 108 mmol/L      CO2 24 mmol/L      ANION GAP 8 mmol/L      BUN 18 mg/dL      Creatinine 1 34 mg/dL      Glucose 110 mg/dL      Calcium 8 9 mg/dL      AST 22 U/L      ALT 19 U/L      Alkaline Phosphatase 75 U/L      Total Protein 6 9 g/dL      Albumin 3 8 g/dL      Total Bilirubin 1 00 mg/dL      eGFR 52 ml/min/1 73sq m     Narrative:       National Kidney Disease Education Program recommendations are as follows:  GFR calculation is accurate only with a steady state creatinine  Chronic Kidney disease less than 60 ml/min/1 73 sq  meters  Kidney failure less than 15 ml/min/1 73 sq  meters      POCT urinalysis dipstick [[id number]]  (Normal) Resulted:  03/04/19 1343    Lab Status:  Final result Specimen:  Urine Updated:  03/04/19 1343     Color, UA done    ED Urine Macroscopic [[id number]]  (Abnormal) Collected:  03/04/19 1345    Lab Status:  Final result Specimen:  Urine Updated:  03/04/19 1342     Color, UA Yellow     Clarity, UA Clear     pH, UA 5 0     Leukocytes, UA Negative     Nitrite, UA Negative     Protein, UA Negative mg/dl      Glucose, UA Negative mg/dl      Ketones, UA Trace mg/dl      Urobilinogen, UA 0 2 E U /dl      Bilirubin, UA Interference- unable to analyze     Blood, UA Negative     Specific Gravity, UA >=1 030    Narrative:       CLINITEK RESULT    CBC and differential [[id number]]  (Abnormal) Collected:  03/04/19 1307    Lab Status:  Final result Specimen:  Blood from Arm, Left Updated:  03/04/19 1315     WBC 13 23 Thousand/uL      RBC 4 64 Million/uL      Hemoglobin 13 7 g/dL      Hematocrit 41 9 %      MCV 90 fL      MCH 29 5 pg MCHC 32 7 g/dL      RDW 13 4 %      MPV 11 1 fL      Platelets 534 Thousands/uL      nRBC 0 /100 WBCs      Neutrophils Relative 79 %      Immat GRANS % 0 %      Lymphocytes Relative 13 %      Monocytes Relative 7 %      Eosinophils Relative 1 %      Basophils Relative 0 %      Neutrophils Absolute 10 45 Thousands/µL      Immature Grans Absolute 0 05 Thousand/uL      Lymphocytes Absolute 1 73 Thousands/µL      Monocytes Absolute 0 86 Thousand/µL      Eosinophils Absolute 0 09 Thousand/µL      Basophils Absolute 0 05 Thousands/µL                  XR shoulder 2+ views LEFT   Final Result by Sean Laboy MD (03/04 1627)      No acute osseous abnormality  Workstation performed: [id number]         XR chest 2 views   Final Result by Sean Laboy MD (03/04 1627)      No acute cardiopulmonary disease  Workstation performed: [id number]         CT head without contrast   Final Result by Lord Donna MD (03/04 1428)      No acute intracranial abnormality  Workstation performed: [id number]               Procedures  Procedures      Phone Consults  ED Phone Contact    ED Course           Identification of Seniors at Risk      Most Recent Value   (ISAR) Identification of Seniors at Risk   Before the illness or injury that brought you to the Emergency, did you need someone to help you on a regular basis? 0 Filed at: 03/04/2019 1227   In the last 24 hours, have you needed more help than usual?  0 Filed at: 03/04/2019 1227   Have you been hospitalized for one or more nights during the past 6 months? 0 Filed at: 03/04/2019 1227   In general, do you see well?  0 Filed at: 03/04/2019 1227   In general, do you have serious problems with your memory?   0 Filed at: 03/04/2019 1227   Do you take more than three different medications every day?  0 Filed at: 03/04/2019 1227   ISAR Score  0 Filed at: 03/04/2019 1227                          MDM  Number of Diagnoses or Management Options  Seizure-like activity [facility]): new and requires workup  Syncope: new and requires workup  Diagnosis management comments: 27-year-old male presenting to the emergency department after seizure-like activity after an MRI, patient had syncopal episode as well earlier today, will order cardiac laboratories as well as CT head  Discussed patient with Neurology as well as SLIM and will be admitted to [place] [address] for EEG  Amount and/or Complexity of Data Reviewed  Clinical lab tests: reviewed and ordered  Tests in the radiology section of CPT®: ordered and reviewed  Tests in the medicine section of CPT®: ordered and reviewed  Review and summarize past medical records: yes  Discuss the patient with other providers: yes        Disposition  Final diagnoses:   Seizure-like activity ([address] )   Syncope     Time reflects when diagnosis was documented in both MDM as applicable and the Disposition within this note     Time User Action Codes Description Comment    3/4/2019  3:59 PM Avril Driver Add [R56 9] Seizure-like activity ([facility] [address] )     3/4/2019  4:01 PM Avril Driver Add [R55] Syncope       ED Disposition     ED Disposition Condition Date/Time Comment    Admit Stable Mon Mar 4, 2019  4:01 PM Case was discussed with MIHAI and the patient's admission status was agreed to be Admission Status: observation status to the service of Dr Lex Birch  Follow-up Information    None         Patient's Medications   Discharge Prescriptions    No medications on file     No discharge procedures on file  ED Provider  Attending physically available and evaluated Gerard Smith I managed the patient along with the ED Attending      Electronically Signed by         Juan Perez MD  03/04/19 6969

## 2019-03-04 NOTE — H&P
H&P- Lang Meng 1945, 68 y o  male MRN: [id number]    Unit/Bed#: ED 22 Encounter: [id number]    Primary Care Provider: Anthony Reyes MD   Date and time admitted to hospital: 3/4/2019 12:18 PM        * Syncope  Assessment & Plan  2 episodes - 2nd witnessed and associated w/ seizure like activity  Tele  Echo  EEG  Orthostatics    Seizure [facility])  Assessment & Plan  Likely provoked from overexertion from shoveling snow  Neuro consult  EEG    Acute pain of left shoulder  Assessment & Plan  XR WNL  Tylenol prn  Possibly related to shoveling - pt left-handed    Lumbar radiculopathy  Assessment & Plan  MRI done outpt today - results pending        VTE Prophylaxis: Enoxaparin (Lovenox)  / sequential compression device   Code Status: Full  POLST: POLST form is not discussed and not completed at this time  Discussion with family: yes    Anticipated Length of Stay:  Patient will be admitted on an Observation basis with an anticipated length of stay of  Less than 2 midnights  Justification for Hospital Stay: need to monitor o/n due to syncope and neuro consult    Total Time for Visit, including Counseling / Coordination of Care: 45 minutes  Greater than 50% of this total time spent on direct patient counseling and coordination of care  Chief Complaint:   seizure    History of Present Illness:    Lang Meng is a 68 y o  male who presents with seizure like activity after getting outpt MRI lumbar spine  Pt had tonic-clonic like activity and then post-ictal period  Of note, after shoveling snow today, he had unwitnessed LOC  Woke up on ground  Never had episodes like thsi before  Did not eat much before shoveling  No f/c/cp/sob/n/v/d  No med changes  C/o severe left shoulder pain w/ movement starting today after second episode  Review of Systems:    Review of Systems   Constitutional: Negative  HENT: Negative  Eyes: Negative  Respiratory: Negative  Cardiovascular: Negative  Gastrointestinal: Negative  Endocrine: Negative  Genitourinary: Negative  Musculoskeletal: Positive for arthralgias  Skin: Negative  Allergic/Immunologic: Negative  Neurological: Positive for seizures  Hematological: Negative  Psychiatric/Behavioral: Negative  Past Medical and Surgical History:     Past Medical History:   Diagnosis Date    Benign essential hypertension     Broken ribs     Chest pain        Past Surgical History:   Procedure Laterality Date    CONTINENT APPENDICOSTOMY      LUMBAR LAMINECTOMY Bilateral 04/2018    TONSILLECTOMY         Meds/Allergies:    Prior to Admission medications    Medication Sig Start Date End Date Taking? Authorizing Provider   amLODIPine (NORVASC) 5 mg tablet Take 1 tablet (5 mg total) by mouth daily 12/3/18  Yes Laura Thomas MD   ascorbic acid (VITAMIN C) 1000 MG tablet Take 1,000 mg by mouth   Yes Historical Provider, MD   cetirizine (ZyrTEC) 10 mg tablet Take 1 tablet by mouth 1/22/18  Yes Historical Provider, MD   DAILY MULTIPLE VITAMINS tablet Take 1 tablet by mouth daily   Yes Historical Provider, MD   glucosamine-chondroitin 500-400 MG tablet Take 1 tablet by mouth daily   Yes Historical Provider, MD   losartan (COZAAR) 100 MG tablet TAKE 1 TABLET BY MOUTH DAILY 1/14/19  Yes Laura Thomas MD   fluorouracil (EFUDEX) 5 % cream NAM TO SCALP AREA BID FOR 3 WEEKS 12/6/18   Historical Provider, MD   hydrocortisone 2 5 % cream Apply topically 2 (two) times a day 1/22/18 3/4/19  Historical Provider, MD     I have reviewed home medications using allscripts  Allergies: Allergies   Allergen Reactions    Diazepam      Increased anger      Gabapentin Other (See Comments)     Severe mood swings, anger       Social History:     Marital Status: /Civil Union     Substance Use History:   Social History     Substance and Sexual Activity   Alcohol Use Yes    Comment: SOCIAL     Social History     Tobacco Use   Smoking Status Former Smoker   Smokeless Tobacco Never Used     Social History     Substance and Sexual Activity   Drug Use Not Currently       Family History:    Family History   Problem Relation Age of Onset    Colon cancer Mother     Cancer Father     Thyroid cancer Father     Other Father         MOTOR VEHICLE TRAFFIC ACCIDENT    Ovarian cancer Sister        Physical Exam:     Vitals:   Blood Pressure: 148/72 (03/04/19 1530)  Pulse: 62 (03/04/19 1530)  Temperature: 97 9 °F (36 6 °C) (03/04/19 1220)  Temp Source: Oral (03/04/19 1220)  Respirations: 16 (03/04/19 1530)  Height: 6' 1" (185 4 cm) (03/04/19 1220)  Weight - Scale: 97 5 kg (215 lb) (03/04/19 1220)  SpO2: 98 % (03/04/19 1530)    Physical Exam   Constitutional: He is oriented to person, place, and time  No distress  HENT:   Head: Normocephalic and atraumatic  Eyes: Conjunctivae and EOM are normal    Neck: Normal range of motion  Neck supple  Cardiovascular: Normal rate and regular rhythm  Pulmonary/Chest: Effort normal and breath sounds normal  He has no wheezes  He has no rales  Abdominal: Soft  Bowel sounds are normal  He exhibits no distension  There is no tenderness  Musculoskeletal: He exhibits tenderness (left shoulder at [facility] joint)  He exhibits no edema  Neurological: He is alert and oriented to person, place, and time  Skin: Skin is warm and dry  He is not diaphoretic  Additional Data:     Lab Results: I have personally reviewed pertinent reports        Results from last 7 days   Lab Units 03/04/19  1307   WBC Thousand/uL 13 23*   HEMOGLOBIN g/dL 13 7   HEMATOCRIT % 41 9   PLATELETS Thousands/uL 315   NEUTROS PCT % 79*   LYMPHS PCT % 13*   MONOS PCT % 7   EOS PCT % 1     Results from last 7 days   Lab Units 03/04/19  1307   SODIUM mmol/L 140   POTASSIUM mmol/L 4 1   CHLORIDE mmol/L 108   CO2 mmol/L 24   BUN mg/dL 18   CREATININE mg/dL 1 34*   ANION GAP mmol/L 8   CALCIUM mg/dL 8 9   ALBUMIN g/dL 3 8   TOTAL BILIRUBIN mg/dL 1 00 ALK PHOS U/L 75   ALT U/L 19   AST U/L 22   GLUCOSE RANDOM mg/dL 110                       Imaging: I have personally reviewed pertinent reports  XR shoulder 2+ views LEFT   Final Result by Milan Burns MD (03/04 1627)      No acute osseous abnormality  Workstation performed: [id number]         XR chest 2 views   Final Result by Milan Burns MD (03/04 1627)      No acute cardiopulmonary disease  Workstation performed: [id number]         CT head without contrast   Final Result by Rekha Rodriguez MD (03/04 0478)      No acute intracranial abnormality  Workstation performed: [id number]             EKG, Pathology, and Other Studies Reviewed on Admission:   · EKG: NSR    Allscripts / Epic Records Reviewed: Yes     ** Please Note: This note has been constructed using a voice recognition system   **

## 2019-03-05 ENCOUNTER — APPOINTMENT (OUTPATIENT)
Dept: NON INVASIVE DIAGNOSTICS | Facility: HOSPITAL | Age: 74
DRG: 312 | End: 2019-03-05
Payer: COMMERCIAL

## 2019-03-05 ENCOUNTER — APPOINTMENT (OUTPATIENT)
Dept: NEUROLOGY | Facility: AMBULATORY SURGERY CENTER | Age: 74
DRG: 312 | End: 2019-03-05
Payer: COMMERCIAL

## 2019-03-05 VITALS
HEART RATE: 62 BPM | BODY MASS INDEX: 28.49 KG/M2 | WEIGHT: 215 LBS | DIASTOLIC BLOOD PRESSURE: 83 MMHG | SYSTOLIC BLOOD PRESSURE: 153 MMHG | RESPIRATION RATE: 16 BRPM | HEIGHT: 73 IN | OXYGEN SATURATION: 98 % | TEMPERATURE: 98.2 F

## 2019-03-05 LAB
ANION GAP SERPL CALCULATED.3IONS-SCNC: 8 MMOL/L (ref 4–13)
ATRIAL RATE: 64 BPM
BUN SERPL-MCNC: 18 MG/DL (ref 5–25)
CALCIUM SERPL-MCNC: 8.6 MG/DL (ref 8.3–10.1)
CHLORIDE SERPL-SCNC: 107 MMOL/L (ref 100–108)
CO2 SERPL-SCNC: 24 MMOL/L (ref 21–32)
CREAT SERPL-MCNC: 1.19 MG/DL (ref 0.6–1.3)
ERYTHROCYTE [DISTWIDTH] IN BLOOD BY AUTOMATED COUNT: 13.5 % (ref 11.6–15.1)
GFR SERPL CREATININE-BSD FRML MDRD: 60 ML/MIN/1.73SQ M
GLUCOSE SERPL-MCNC: 84 MG/DL (ref 65–140)
HCT VFR BLD AUTO: 39.9 % (ref 36.5–49.3)
HGB BLD-MCNC: 13.2 G/DL (ref 12–17)
MCH RBC QN AUTO: 30.1 PG (ref 26.8–34.3)
MCHC RBC AUTO-ENTMCNC: 33.1 G/DL (ref 31.4–37.4)
MCV RBC AUTO: 91 FL (ref 82–98)
P AXIS: 57 DEGREES
PLATELET # BLD AUTO: 290 THOUSANDS/UL (ref 149–390)
PMV BLD AUTO: 11 FL (ref 8.9–12.7)
POTASSIUM SERPL-SCNC: 4 MMOL/L (ref 3.5–5.3)
PR INTERVAL: 182 MS
QRS AXIS: -22 DEGREES
QRSD INTERVAL: 86 MS
QT INTERVAL: 404 MS
QTC INTERVAL: 416 MS
RBC # BLD AUTO: 4.39 MILLION/UL (ref 3.88–5.62)
SODIUM SERPL-SCNC: 139 MMOL/L (ref 136–145)
T WAVE AXIS: 61 DEGREES
TSH SERPL DL<=0.05 MIU/L-ACNC: 1.28 UIU/ML (ref 0.36–3.74)
VENTRICULAR RATE: 64 BPM
WBC # BLD AUTO: 8.07 THOUSAND/UL (ref 4.31–10.16)

## 2019-03-05 PROCEDURE — 95819 EEG AWAKE AND ASLEEP: CPT | Performed by: PSYCHIATRY & NEUROLOGY

## 2019-03-05 PROCEDURE — 84443 ASSAY THYROID STIM HORMONE: CPT | Performed by: PHYSICIAN ASSISTANT

## 2019-03-05 PROCEDURE — 99238 HOSP IP/OBS DSCHRG MGMT 30/<: CPT | Performed by: PHYSICIAN ASSISTANT

## 2019-03-05 PROCEDURE — 93306 TTE W/DOPPLER COMPLETE: CPT | Performed by: INTERNAL MEDICINE

## 2019-03-05 PROCEDURE — 93306 TTE W/DOPPLER COMPLETE: CPT

## 2019-03-05 PROCEDURE — 99222 1ST HOSP IP/OBS MODERATE 55: CPT | Performed by: PSYCHIATRY & NEUROLOGY

## 2019-03-05 PROCEDURE — 93010 ELECTROCARDIOGRAM REPORT: CPT | Performed by: INTERNAL MEDICINE

## 2019-03-05 PROCEDURE — 80048 BASIC METABOLIC PNL TOTAL CA: CPT | Performed by: GENERAL PRACTICE

## 2019-03-05 PROCEDURE — 85027 COMPLETE CBC AUTOMATED: CPT | Performed by: GENERAL PRACTICE

## 2019-03-05 PROCEDURE — 95816 EEG AWAKE AND DROWSY: CPT

## 2019-03-05 RX ADMIN — ENOXAPARIN SODIUM 40 MG: 40 INJECTION SUBCUTANEOUS at 10:12

## 2019-03-05 RX ADMIN — Medication 500 MG: at 10:12

## 2019-03-05 RX ADMIN — AMLODIPINE BESYLATE 5 MG: 5 TABLET ORAL at 10:14

## 2019-03-05 RX ADMIN — LOSARTAN POTASSIUM 100 MG: 50 TABLET, FILM COATED ORAL at 10:13

## 2019-03-05 RX ADMIN — LORATADINE 10 MG: 10 TABLET ORAL at 10:14

## 2019-03-05 RX ADMIN — OXYCODONE HYDROCHLORIDE AND ACETAMINOPHEN 1000 MG: 500 TABLET ORAL at 10:12

## 2019-03-05 NOTE — PLAN OF CARE
Problem: DISCHARGE PLANNING - CARE MANAGEMENT  Goal: Discharge to post-acute care or home with appropriate resources  Description  INTERVENTIONS:  - Conduct assessment to determine patient/family and health care team treatment goals, and need for post-acute services based on payer coverage, community resources, and patient preferences, and barriers to discharge  - Address psychosocial, clinical, and financial barriers to discharge as identified in assessment in conjunction with the patient/family and health care team  - Arrange appropriate level of post-acute services according to patient's   needs and preference and payer coverage in collaboration with the physician and health care team  - Communicate with and update the patient/family, physician, and health care team regarding progress on the discharge plan  - Arrange appropriate transportation to post-acute venues  - DC plan to go home     Outcome: Progressing

## 2019-03-05 NOTE — RESTORATIVE TECHNICIAN NOTE
Restorative Specialist Mobility Note       Activity: Ambulate in [place], Ambulate in room, Bathroom privileges, Chair, Stand at bedside, Dangle(Educated/encouraged pt to ambulate with assistance 3-4 x's/day   Pt callbell, phone/tray within reach )     Assistive Device: None       Jenny MONTIEL, Restorative Technician, [employer]

## 2019-03-05 NOTE — PLAN OF CARE
Problem: Potential for Falls  Goal: Patient will remain free of falls  Description  INTERVENTIONS:  - Assess patient frequently for physical needs  -  Identify cognitive and physical deficits and behaviors that affect risk of falls  -  [place] fall precautions as indicated by assessment   - Educate patient/family on patient safety including physical limitations  - Instruct patient to call for assistance with activity based on assessment  - Modify environment to reduce risk of injury  - Consider OT/PT consult to assist with strengthening/mobility  Outcome: Progressing     Problem: NEUROSENSORY - ADULT  Goal: Achieves stable or improved neurological status  Description  INTERVENTIONS  - Monitor and report changes in neurological status  - Initiate measures to prevent increased intracranial pressure  - Maintain blood pressure and fluid volume within ordered parameters to optimize cerebral perfusion  - Monitor temperature, glucose, and sodium or any other associated labs   Initiate appropriate interventions as ordered  - Monitor for seizure activity   - Administer anti-seizure medications as ordered  Outcome: Progressing     Problem: MUSCULOSKELETAL - ADULT  Goal: Maintain or return mobility to safest level of function  Description  INTERVENTIONS:  - Assess patient's ability to carry out ADLs; assess patient's baseline for ADL function and identify physical deficits which impact ability to perform ADLs (bathing, care of mouth/teeth, toileting, grooming, dressing, etc )  - Assess/evaluate cause of self-care deficits   - Assess range of motion  - Assess patient's mobility; develop plan if impaired  - Assess patient's need for assistive devices and provide as appropriate  - Encourage maximum independence but intervene and supervise when necessary  - Involve family in performance of ADLs  - Assess for home care needs following discharge   - Request OT consult to assist with ADL evaluation and planning for discharge  - Provide patient education as appropriate  Outcome: Progressing     Problem: PAIN - ADULT  Goal: Verbalizes/displays adequate comfort level or baseline comfort level  Description  Interventions:  - Encourage patient to monitor pain and request assistance  - Assess pain using appropriate pain scale  - Administer analgesics based on type and severity of pain and evaluate response  - Implement non-pharmacological measures as appropriate and evaluate response  - Consider cultural and social influences on pain and pain management  - Notify physician/advanced practitioner if interventions unsuccessful or patient reports new pain  Outcome: Progressing     Problem: DISCHARGE PLANNING  Goal: Discharge to home or other facility with appropriate resources  Description  INTERVENTIONS:  - Identify barriers to discharge w/patient and caregiver  - Arrange for needed discharge resources and transportation as appropriate  - Identify discharge learning needs (meds, wound care, etc )  - Arrange for interpretive services to assist at discharge as needed  - Refer to Case Management Department for coordinating discharge planning if the patient needs post-hospital services based on physician/advanced practitioner order or complex needs related to functional status, cognitive ability, or social support system  Outcome: Progressing     Problem: Knowledge Deficit  Goal: Patient/family/caregiver demonstrates understanding of disease process, treatment plan, medications, and discharge instructions  Description  Complete learning assessment and assess knowledge base    Interventions:  - Provide teaching at level of understanding  - Provide teaching via preferred learning methods  Outcome: Progressing

## 2019-03-05 NOTE — PLAN OF CARE
Problem: Potential for Falls  Goal: Patient will remain free of falls  Description  INTERVENTIONS:  - Assess patient frequently for physical needs  -  Identify cognitive and physical deficits and behaviors that affect risk of falls  -  [place] fall precautions as indicated by assessment   - Educate patient/family on patient safety including physical limitations  - Instruct patient to call for assistance with activity based on assessment  - Modify environment to reduce risk of injury  - Consider OT/PT consult to assist with strengthening/mobility  Outcome: Progressing     Problem: NEUROSENSORY - ADULT  Goal: Achieves stable or improved neurological status  Description  INTERVENTIONS  - Monitor and report changes in neurological status  - Initiate measures to prevent increased intracranial pressure  - Maintain blood pressure and fluid volume within ordered parameters to optimize cerebral perfusion  - Monitor temperature, glucose, and sodium or any other associated labs   Initiate appropriate interventions as ordered  - Monitor for seizure activity   - Administer anti-seizure medications as ordered  Outcome: Progressing     Problem: MUSCULOSKELETAL - ADULT  Goal: Maintain or return mobility to safest level of function  Description  INTERVENTIONS:  - Assess patient's ability to carry out ADLs; assess patient's baseline for ADL function and identify physical deficits which impact ability to perform ADLs (bathing, care of mouth/teeth, toileting, grooming, dressing, etc )  - Assess/evaluate cause of self-care deficits   - Assess range of motion  - Assess patient's mobility; develop plan if impaired  - Assess patient's need for assistive devices and provide as appropriate  - Encourage maximum independence but intervene and supervise when necessary  - Involve family in performance of ADLs  - Assess for home care needs following discharge   - Request OT consult to assist with ADL evaluation and planning for discharge  - Provide patient education as appropriate  Outcome: Progressing     Problem: PAIN - ADULT  Goal: Verbalizes/displays adequate comfort level or baseline comfort level  Description  Interventions:  - Encourage patient to monitor pain and request assistance  - Assess pain using appropriate pain scale  - Administer analgesics based on type and severity of pain and evaluate response  - Implement non-pharmacological measures as appropriate and evaluate response  - Consider cultural and social influences on pain and pain management  - Notify physician/advanced practitioner if interventions unsuccessful or patient reports new pain  Outcome: Progressing     Problem: DISCHARGE PLANNING  Goal: Discharge to home or other facility with appropriate resources  Description  INTERVENTIONS:  - Identify barriers to discharge w/patient and caregiver  - Arrange for needed discharge resources and transportation as appropriate  - Identify discharge learning needs (meds, wound care, etc )  - Arrange for interpretive services to assist at discharge as needed  - Refer to Case Management Department for coordinating discharge planning if the patient needs post-hospital services based on physician/advanced practitioner order or complex needs related to functional status, cognitive ability, or social support system  Outcome: Progressing     Problem: Knowledge Deficit  Goal: Patient/family/caregiver demonstrates understanding of disease process, treatment plan, medications, and discharge instructions  Description  Complete learning assessment and assess knowledge base    Interventions:  - Provide teaching at level of understanding  - Provide teaching via preferred learning methods  Outcome: Progressing

## 2019-03-05 NOTE — ASSESSMENT & PLAN NOTE
· Shoulder XR no acute osseous abnormality  · Tylenol prn  · Possibly related to shoveling - pt left-handed

## 2019-03-05 NOTE — SOCIAL WORK
CM met with patient to explain role and gather information for discharge planning  Patient lives in a 2 story home with 7 CAR with his wife  Patient functions IPTA  Patient has a cane but only uses it for long distances and otherwise has no use of DME  No Hx of HHC or IP RHB/SNF  Patient reports no Hx of drugs/substance use and only drinks extremely rarely on social occasions  Patient states no Hx of mental health  Patient drives  Patient PCP is Dr Earlene Sadler  Patient has prescription coverage and prefers using [facility] in [place]  Patient has living will and Tia Batista is his wife  CM requested those documents  Upon DC, patient states his neighbor would be able to provide transport  Contact: Wife-Pat Phone: [phone] Ext  [phone]    CM reviewed d/c planning process including the following: identifying help at home, patient preference for d/c planning needs, Discharge Lounge, [facility] Meds to Bed program, availability of treatment team to discuss questions or concerns patient and/or family may have regarding understanding medications and recognizing signs and symptoms once discharged  CM also encouraged patient to follow up with all recommended appointments after discharge  Patient advised of importance for patient and family to participate in managing patients medical well being  Patient/caregiver received discharge checklist   Content reviewed  Patient/caregiver encouraged to participate in discharge plan of care prior to discharge home  Regine [place] (Student)  Reviewed and approved by KAYLA Nye

## 2019-03-05 NOTE — UTILIZATION REVIEW
Initial Clinical Review    Admission: Date/Time/Statement: 03/04/19 1643  OBSERVATION CHANGED TO INPATIENT 3-5-119 1500     The patient, admitted on an observation basis, will now require > 2 midnight hospital stay due to ongoing work-up, will need MRI of brain, r/o intracranial cause of seizure, needs PT eval      Inpatient Admission Once     Transfer Service: General Medicine       Question Answer   Admitting Physician SCOT Padgett   Level of Care Med Surg   Estimated length of stay More than 2 Midnights   Certification I certify that inpatient services are medically necessary for this patient for a duration of greater than two midnights  See H&P and MD Progress Notes for additional information about the patient's course of treatment  ED: Date/Time/Mode of Arrival:   ED Arrival Information     Expected Arrival Acuity Means of Arrival Escorted By Service Admission Type    - 3/4/2019 12:17 Emergent Walk[facility]In Self Hospitalist Emergency    Arrival Complaint    Seizure        Chief Complaint:   Chief Complaint   Patient presents with    Seizure - New Onset     Patient was a medical emergency at MRI  Per patient he has just finished MRI with contrast, sat up felt lightheaded and had seizure like activity for a few seconds  Patient states he feels better now  Patient states he has not had contrast dye before  Assessment/Plan:     68year old male presents to the ed for evaluation and treatment of seizure like event as he was getting an outpatient MRI of the lumbar spine  Witnessed tonic clonic activity and a post ictal period  Also of note  Earlier today while shoveling snow  He had an unwitnessed loss of consciousness  He work up on the ground  Patient states he has never had an event like this before  After the second event, he describes  Severe left shoulder pain with movement            ED Triage Vitals [03/04/19 1220]   97 9 °F (36 6 °C) 65 19 141/76 99 %      No Pain       03/04/19 97 5 kg (215 lb)     Date/Time  Temp  Pulse  Resp  BP  SpO2  O2 Device  Patient Position - Orthostatic VS   03/05/19 0702  --  92  18  177/95Abnormal   --  --  Standing - Orthostatic VS   03/05/19 0701  --  95  18  164/94  --  --  Sitting - Orthostatic VS   03/05/19 0700  98 2 °F (36 8 °C)  82  18  156/88  97 %  None (Room air)  Lying - Orthostatic VS   03/04/19 2210  98 8 °F (37 1 °C)  100  18  147/90  98 %  --  Standing - Orthostatic VS   03/04/19 2208  98 8 °F (37 1 °C)  81  18  149/78  97 %  --  Sitting - Orthostatic VS   03/04/19 2207  98 8 °F (37 1 °C)  70  18  150/76  97 %  --  Lying - Orthostatic VS         Pertinent Labs/Diagnostic Test Results    He exhibits  Tenderness of the left AC shoulder joint    Wbc 13 23  Creatinine 1 34   Troponin   02      Imaging :  No acute findings    EKG  nsr     ED Treatment:   Medication Administration from 03/04/2019 1216 to 03/04/2019 1751       Date/Time Order Dose Route Action Action by Comments     03/04/2019 1615 acetaminophen (TYLENOL) tablet 650 mg 650 mg Oral Given Mervat Reyes RN         Past Medical/Surgical History:     Diagnosis    Abnormal findings on diagnostic imaging of other specified body structures    Allergic rhinitis    Benign enlargement of prostate    Benign essential hypertension    Osteoarthritis    HNP (herniated nucleus pulposus), lumbar    Lumbar radiculopathy    S/P lumbar laminectomy    Actinic keratoses    Left leg weakness         Admitting Diagnosis:     Seizure ([address] ) [R56 9]  Syncope [R55]  Seizure-like activity ([address] ) [R56 9]       Age/Sex: 68 y o  male   Presents with 12 episodes of of seizure like activity today  One is witnessed  Both are new events requiring a neurology consult, neuro checks and EEG  In addition ruling  out cardiac etiology with telemetry, echo and orthostatics vital signs  Shoulder and lumbar pain are likely musculoskeletal and rated  4/10 with non narcotic pain management    MRI results of the lumbar Spine are pending                Admission Orders:    Telemetry  Neuro checks q4 hr  Consult neurology   EEG awake or drowsy routine  ECHO  Repeat CBC  With platelets  BMP    acetaminophen 650 mg Oral Q6H PRN   amLODIPine 5 mg Oral Daily   ascorbic acid 1,000 mg Oral Daily   enoxaparin 40 mg Subcutaneous Daily   glucosamine sulfate 500 mg Oral Daily   loratadine 10 mg Oral Daily   losartan 100 mg Oral Daily
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
## 2019-03-05 NOTE — PROGRESS NOTES
[facility]  Progress Note - Christopher Cobian 1945, 68 y o  male MRN: [id number]    Unit/Bed#: [facility] 701-01 Encounter: [id number]    Primary Care Provider: Bhavesh Vasquez MD   Date and time admitted to hospital: 3/4/2019 12:18 PM      * Syncope  Assessment & Plan  · Two episodes of syncope, one while shoveling snow at home and one in MRI with witnessed seizure-like activity  · He reports he recalls the event in MRI and states he may have shaken his arms out to "stretch out" from MRI  He does report he did lose consciousness at home while shoveling snow and his wife found him  · He denies lightheadedness, dizziness today  No further syncopal episodes  · No events on tele  · Orthostatics negative  · Echo pending   · EEG pending  · Neuro consult pending    Acute pain of left shoulder  Assessment & Plan  · Shoulder XR no acute osseous abnormality  · Tylenol prn  · Possibly related to shoveling - pt left-handed    Seizure ([address] )  Assessment & Plan  · Pt witnessed seizure-like activity in MRI  · EEG pending  · Neurology consult pending    Lumbar radiculopathy  Assessment & Plan  · MRI done outpatient 3/24/19  Results pending      VTE Pharmacologic Prophylaxis:   Pharmacologic: Enoxaparin (Lovenox)  Mechanical VTE Prophylaxis in Place: Yes    Patient Centered Rounds: I have performed bedside rounds with nursing staff today  Discussions with Specialists or Other Care Team Provider: RN    Education and Discussions with Family / Patient: patient    Time Spent for Care: 30 minutes  More than 50% of total time spent on counseling and coordination of care as described above      Current Length of Stay: 0 day(s)    Current Patient Status: Observation   Certification Statement: The patient, admitted on an observation basis, will now require > 2 midnight hospital stay due to ongoing work-up, will need MRI of brain, r/o intracranial cause of seizure, needs PT eval    Discharge Plan: later today vs 24 hrs pending work-up and neurology eval    Code Status: Level 1 - Full Code      Subjective:   Patient has no acute complaints today  He feels well and denies any further episodes of syncope, lightheadedness, dizziness  He walked the halls without issues today  He states yesterday morning when shoveling he did not have anything to eat or drink  No hx of syncope or seizures  Feels well  Objective:     Vitals:   Temp (24hrs), Av 5 °F (36 9 °C), Min:97 9 °F (36 6 °C), Max:98 8 °F (37 1 °C)    Temp:  [97 9 °F (36 6 °C)-98 8 °F (37 1 °C)] 98 2 °F (36 8 °C)  HR:  [] 86  Resp:  [16-19] 18  BP: (129-177)/(72-95) 129/80  SpO2:  [96 %-99 %] 98 %  Body mass index is 28 37 kg/m²  Input and Output Summary (last 24 hours): Intake/Output Summary (Last 24 hours) at 3/5/2019 1133  Last data filed at 3/5/2019 0800  Gross per 24 hour   Intake 420 ml   Output --   Net 420 ml       Physical Exam:     Physical Exam   Constitutional: He is oriented to person, place, and time  He appears well-developed and well-nourished  No distress  HENT:   Head: Normocephalic and atraumatic  Mouth/Throat: No oropharyngeal exudate  Eyes: EOM are normal  No scleral icterus  Neck: Normal range of motion  Neck supple  Cardiovascular: Normal rate and regular rhythm  No murmur heard  Pulmonary/Chest: Effort normal and breath sounds normal  No stridor  No respiratory distress  He has no wheezes  Abdominal: Soft  Bowel sounds are normal  He exhibits no distension  There is no tenderness  Musculoskeletal: Normal range of motion  He exhibits no edema or tenderness  Neurological: He is alert and oriented to person, place, and time  No cranial nerve deficit  Coordination normal    Skin: Skin is warm and dry  He is not diaphoretic  No erythema  Psychiatric: He has a normal mood and affect  His behavior is normal    Vitals reviewed          Additional Data:     Labs:    Results from last 7 days   Lab Units 03/05/19  0448 03/04/19  1307   WBC Thousand/uL 8 07 13 23*   HEMOGLOBIN g/dL 13 2 13 7   HEMATOCRIT % 39 9 41 9   PLATELETS Thousands/uL 290 315   NEUTROS PCT %  --  79*   LYMPHS PCT %  --  13*   MONOS PCT %  --  7   EOS PCT %  --  1     Results from last 7 days   Lab Units 03/05/19  0448 03/04/19  1307   SODIUM mmol/L 139 140   POTASSIUM mmol/L 4 0 4 1   CHLORIDE mmol/L 107 108   CO2 mmol/L 24 24   BUN mg/dL 18 18   CREATININE mg/dL 1 19 1 34*   ANION GAP mmol/L 8 8   CALCIUM mg/dL 8 6 8 9   ALBUMIN g/dL  --  3 8   TOTAL BILIRUBIN mg/dL  --  1 00   ALK PHOS U/L  --  75   ALT U/L  --  19   AST U/L  --  22   GLUCOSE RANDOM mg/dL 84 110                           * I Have Reviewed All Lab Data Listed Above  * Additional Pertinent Lab Tests Reviewed: All Labs Within Last 24 Hours Reviewed    Imaging:    Imaging Reports Reviewed Today Include: none  Imaging Personally Reviewed by Myself Includes:  none    Recent Cultures (last 7 days):           Last 24 Hours Medication List:     Current Facility-Administered Medications:  acetaminophen 650 mg Oral Q6H PRN Aurea Croak, DO   amLODIPine 5 mg Oral Daily Aurea Croak, DO   ascorbic acid 1,000 mg Oral Daily Aurea Croak, DO   enoxaparin 40 mg Subcutaneous Daily Aurea Croak, DO   glucosamine sulfate 500 mg Oral Daily Aurea Croak, DO   loratadine 10 mg Oral Daily Aurea Croak, DO   losartan 100 mg Oral Daily Aurea Croak, DO        Today, Patient Was Seen By: Elzbieta March PA-C    ** Please Note: Dictation voice to text software may have been used in the creation of this document   **

## 2019-03-05 NOTE — DISCHARGE INSTRUCTIONS
Telemetry monitor, which monitors rhythm of the heart, was unremarkable for any abnormalities  Echocardiogram which is ultrasound of your heart was normal   Routine EEG, which shows for signs of seizure activity, was normal   Recommend to follow up with her primary care provider and Neurology upon discharge  Recommend to remain hydrated  If symptoms recur, return to ER for evaluation  Syncope   WHAT YOU NEED TO KNOW:   Syncope is also called fainting or passing out  Syncope is a sudden, temporary loss of consciousness, followed by a fall from a standing or sitting position  Syncope ranges from not serious to a sign of a more serious condition that needs to be treated  You can control some health conditions that cause syncope  Your healthcare providers can help you create a plan to manage syncope and prevent episodes  DISCHARGE INSTRUCTIONS:   Seek care immediately if:   · You are bleeding because you hit your head when you fainted  · You suddenly have double vision, difficulty speaking, numbness, and cannot move your arms or legs  · You have chest pain and trouble breathing  · You vomit blood or material that looks like coffee grounds  · You see blood in your bowel movement  Contact your healthcare provider if:   · You have new or worsening symptoms  · You have another syncope episode  · You have a headache, fast heartbeat, or feel too dizzy to stand up  · You have questions or concerns about your condition or care  Follow up with your healthcare provider as directed:  Write down your questions so you remember to ask them during your visits  Manage syncope:   · Keep a record of your syncope episodes  Include your symptoms and your activity before and after the episode  The record can help your healthcare provider find the cause of your syncope and help you manage episodes  · Sit or lie down when needed    This includes when you feel dizzy, your throat is getting tight, and your vision changes  Raise your legs above the level of your heart  · Take slow, deep breaths if you start to breathe faster with anxiety or fear  This can help decrease dizziness and the feeling that you might faint  · Check your blood pressure often  This is important if you take medicine to lower your blood pressure  Check your blood pressure when you are lying down and when you are standing  Ask how often to check during the day  Keep a record of your blood pressure numbers  Your healthcare provider may use the record to help plan your treatment  Prevent a syncope episode:   · Move slowly and let yourself get used to one position before you move to another position  This is very important when you change from a lying or sitting position to a standing position  Take some deep breaths before you stand up from a lying position  Stand up slowly  Sudden movements may cause a fainting spell  Sit on the side of the bed or couch for a few minutes before you stand up  If you are on bedrest, try to be upright for about 2 hours each day, or as directed  Do not lock your legs if you are standing for a long period of time  Move your legs and bend your knees to keep blood flowing  · Follow your healthcare provider's recommendations  Your provider may  recommend that you drink more liquids to prevent dehydration  You may also need to have more salt to keep your blood pressure from dropping too low and causing syncope  Your provider will tell you how much liquid and sodium to have each day  · Watch for signs of low blood sugar  These include hunger, nervousness, sweating, and fast or fluttery heartbeats  Talk with your healthcare provider about ways to keep your blood sugar level steady  · Do not strain if you are constipated  You may faint if you strain to have a bowel movement  Walking is the best way to get your bowels moving  Eat foods high in fiber to make it easier to have a bowel movement  Good examples are high-fiber cereals, beans, vegetables, and whole-grain breads  Prune juice may help make bowel movements softer  · Be careful in hot weather  Heat can cause a syncope episode  Limit activity done outside on hot days  Physical activity in hot weather can lead to dehydration  This can cause an episode  © 2017 [employer] Information is for End User's use only and may not be sold, redistributed or otherwise used for commercial purposes  All illustrations and images included in CareNotes® are the copyrighted property of A [employer] A M [employer]  or Jesús Manjarrez  The above information is an  only  It is not intended as medical advice for individual conditions or treatments  Talk to your doctor, nurse or pharmacist before following any medical regimen to see if it is safe and effective for you

## 2019-03-05 NOTE — ASSESSMENT & PLAN NOTE
· Two episodes of syncope, one while shoveling snow at home and one in MRI with witnessed seizure-like activity  · He reports he recalls the event in MRI and states he may have shaken his arms out to "stretch out" from MRI  He does report he did lose consciousness at home while shoveling snow and his wife found him  · He denies lightheadedness, dizziness today    No further syncopal episodes  · No events on tele  · Orthostatics negative  · Echo pending   · EEG pending  · Neuro consult pending

## 2019-03-06 ENCOUNTER — TRANSITIONAL CARE MANAGEMENT (OUTPATIENT)
Dept: FAMILY MEDICINE CLINIC | Facility: CLINIC | Age: 74
End: 2019-03-06

## 2019-03-06 NOTE — ASSESSMENT & PLAN NOTE
· Two episodes of syncope, one while shoveling snow at home and one in MRI with witnessed seizure-like activity  · He reports he recalls the event in MRI and states he may have shaken his arms out to "stretch out" from MRI  He does report he did lose consciousness at home while shoveling snow and his wife found him  · He denies lightheadedness, dizziness today  No further syncopal episodes  · No events on tele  · Orthostatics negative  · Echo EF 55%, mild concentric hypertrophy, G1DD, trace regurgitation tricuspid valve  Left and right atrium normal size  · EEG normal  · Neurology consulted, appreciate input    No further inpatient neurological workup is recommended and he can follow up with Neurology upon discharge  · Stable for d/c with outpatient f/u with neurology, PCP

## 2019-03-06 NOTE — ASSESSMENT & PLAN NOTE
· Pt witnessed seizure-like activity in MRI  · EEG negative  · Neurology consulted, appreciate input  No clear evidence of ictal activity    Can follow up with Neurology as an outpatient

## 2019-03-06 NOTE — PHYSICIAN ADVISOR
Current patient class: Inpatient  The patient is currently on Hospital Day: 2 at [facility]      The patient was admitted to the hospital  on 3/5/19 at 1500 for the following diagnosis:  Seizure [facility]) [R56 9]  Syncope [R55]  Seizure-like activity ([facility] [address] ) [R56 9]     After review of the relevant documentation, labs, vital signs and test results, the patient is a provider liable case and is most appropriate for OBSERVATION STATUS  In this particular case the patient was admitted to the hospital as an inpatient  The patient however fails to satisfy the 2 midnight benchmark and closer scrutiny of the case is warranted  After review of the patient presentation and relevant labs the patient was most appropriate for observation status on admission  Given that this patient has already been discharged prior to this review they become a provider liable case  Rationale is as follows: The patient is a 68 yrs   Male who presented to the ED at 3/4/2019 12:18 PM with a chief complaint of Seizure - New Onset (Patient was a medical emergency at MRI  Per patient he has just finished MRI with contrast, sat up felt lightheaded and had seizure like activity for a few seconds  Patient states he feels better now   Patient states he has not had contrast dye before  )    The patients vitals on arrival were ED Triage Vitals [03/04/19 1220]   Temperature Pulse Respirations Blood Pressure SpO2   97 9 °F (36 6 °C) 65 19 141/76 99 %      Temp Source Heart Rate Source Patient Position - Orthostatic VS BP Location FiO2 (%)   Oral Monitor Sitting Right arm --      Pain Score       No Pain           Past Medical History:   Diagnosis Date    Benign essential hypertension     Broken ribs     Chest pain      Past Surgical History:   Procedure Laterality Date    APPENDECTOMY      LUMBAR LAMINECTOMY Bilateral 04/2018    TONSILLECTOMY             Consults have been placed to:   IP CONSULT TO NEUROLOGY    Vitals:    03/05/19 0701 03/05/19 0702 03/05/19 1100 03/05/19 1513   BP: 164/94 (!) 177/95 129/80 153/83   BP Location: Left arm Left arm Left arm Right arm   Pulse: 95 92 86 62   Resp: 18 18 18 16   Temp:    98 2 °F (36 8 °C)   TempSrc:    Oral   SpO2:   98% 98%   Weight:       Height:           Most recent labs:    Recent Labs     03/04/19  1307 03/05/19  0448   WBC 13 23* 8 07   HGB 13 7 13 2   HCT 41 9 39 9    290   K 4 1 4 0   CALCIUM 8 9 8 6   BUN 18 18   CREATININE 1 34* 1 19   TROPONINI <0 02  --    AST 22  --    ALT 19  --    ALKPHOS 75  --        Scheduled Meds:  Continuous Infusions:  No current facility-administered medications for this encounter  PRN Meds:      Surgical procedures (if appropriate):

## 2019-03-06 NOTE — DISCHARGE SUMMARY
Discharge- Perfecto Pina 1945, 68 y o  male MRN: [id number]    [facility] Encounter: [id number]    Primary Care Provider: Remberto Tervino MD   Date and time admitted to hospital: 3/4/2019 12:18 PM        * Syncope  Assessment & Plan  · Two episodes of syncope, one while shoveling snow at home and one in MRI with witnessed seizure-like activity  · He reports he recalls the event in MRI and states he may have shaken his arms out to "stretch out" from MRI  He does report he did lose consciousness at home while shoveling snow and his wife found him  · He denies lightheadedness, dizziness today  No further syncopal episodes  · No events on tele  · Orthostatics negative  · Echo EF 55%, mild concentric hypertrophy, G1DD, trace regurgitation tricuspid valve  Left and right atrium normal size  · EEG normal  · Neurology consulted, appreciate input  No further inpatient neurological workup is recommended and he can follow up with Neurology upon discharge  · Stable for d/c with outpatient f/u with neurology, PCP    Acute pain of left shoulder  Assessment & Plan  · Shoulder XR no acute osseous abnormality  · Tylenol prn  · Possibly related to shoveling - pt left-handed    Seizure ([address] )  Assessment & Plan  · Pt witnessed seizure-like activity in MRI  · EEG negative  · Neurology consulted, appreciate input  No clear evidence of ictal activity  Can follow up with Neurology as an outpatient    Lumbar radiculopathy  Assessment & Plan  · MRI done outpatient 3/24/19    Results pending        Discharging Physician / Practitioner: Nicci Hughes PA-C  PCP: Remberto Trevino MD  Admission Date:   Admission Orders (From admission, onward)    Ordered        03/05/19 1500  Inpatient Admission  Once     Order ID Start Status   [id number] 03/05/19 1500 Completed          03/04/19 1643  Place in Observation  Once     Order ID Start Status   [id number] 03/04/19 1643 Completed              Discharge Date: 3/5/19    Resolved Problems  Date Reviewed: 3/6/2019    None          Consultations During Hospital Stay:  · Neurology    Procedures Performed:   · EEG    Significant Findings / Test Results:   · EEG was normal  · CT head without contrast showed no acute intracranial abnormality  · Echo EF 74%, grade 1 diastolic dysfunction and no regional wall motion abnormalities  · X-ray shoulder left:  No acute osseous abnormality  · Chest x-ray:  No acute cardiopulmonary disease    Incidental Findings:   ·  None    Test Results Pending at Discharge (will require follow up): · None     Outpatient Tests Requested:  · None    Complications:  None    Reason for Admission: seizure, syncope    Hospital Course:     Erlin Pena is a 68 y o  male patient who originally presented to the hospital on 3/4/2019 due to syncope and witnessed seizure-like activity  He reports that the morning of admission, he was shoveling snow very early in the morning, felt lightheaded and passed out  Reports he was maybe out for a few minutes when his wife found him in the driveway  He reports some left shoulder pain but otherwise her complaints  He went for an MRI of the lumbar spine to due to back pain  While he was in the MRI, and medical emergency was called because after MRI staff had witnessed possible seizure-like activity associated with a syncopal events  Patient reports he recalls the event, he states that he felt cramps while in MRI and was shaking his arms out  He denies ever having a syncopal event or seizure like event while on MRI  He does not have any prior history of seizures  He has not had any further syncopal episodes or lightheadedness or dizziness  Orthostatics were negative  EEG and echo were within normal limits  He was evaluated by Neurology, who felt presentation was not convincing for a seizure  He can follow up with Neurology as an outpatient  Follow up with PCP upon discharge      The patient, initially admitted to the hospital as inpatient, was discharged earlier than expected given the following: initial plan was to obtain MRI and pursue further seizure work-up, however it was determined presentation not consistent with seizure and can d/c with neurology outpatient f/u  Please see above list of diagnoses and related plan for additional information  Condition at Discharge: good     Discharge Day Visit / Exam:     * Please refer to separate progress note for these details *    Discussion with Family: patient, spouse at bedside    Discharge instructions/Information to patient and family:   See after visit summary for information provided to patient and family  Provisions for Follow-Up Care:  See after visit summary for information related to follow-up care and any pertinent home health orders  Disposition:     Home    For Discharges to [facility] SNF:   · Not Applicable to this Patient - Not Applicable to this Patient    Planned Readmission: no     Discharge Statement:  I spent 20 minutes discharging the patient  This time was spent on the day of discharge  I had direct contact with the patient on the day of discharge  Greater than 50% of the total time was spent examining patient, answering all patient questions, arranging and discussing plan of care with patient as well as directly providing post-discharge instructions  Additional time then spent on discharge activities  Discharge Medications:  See after visit summary for reconciled discharge medications provided to patient and family        ** Please Note: This note has been constructed using a voice recognition system **

## 2019-03-06 NOTE — UTILIZATION REVIEW
Notification of Inpatient Admission/Inpatient Authorization Request  This is a Notification of Inpatient Admission/Request for Inpatient Authorization for our facility [facility]  Be advised that this patient was admitted to our facility under Inpatient Status  Please contact the Utilization Review Department where the patient is receiving care services for additional admission information  Place of Service Code: 24   Place of Service Name: Inpatient Hospital  Presentation Date & Time: 3/4/2019 12:18 PM  Inpatient Admission Date & Time: 3/5/19 1500  Discharge Date & Time: 3/5/2019  7:45 PM   Discharge Disposition (if discharged): Home/Self Care  Attending Physician: Catarino Pride, Amish Osorio [[id number]]  Admission Orders (From admission, onward)    Ordered        03/05/19 1500  Inpatient Admission  Once     Order ID Start Status   [id number] 03/05/19 1500 Completed          03/04/19 1643  Place in Observation  Once     Order ID Start Status   [id number] 03/04/19 1643 Completed              Facility: [address] Utilization Review Department  Phone: [phone] Tracee Townsend; Fax [phone]    Send all requests for admission clinical reviews, approved or denied determinations and any other requests to fax [phone]   All voicemails are confidential

## 2019-03-07 ENCOUNTER — TELEPHONE (OUTPATIENT)
Dept: NEUROLOGY | Facility: CLINIC | Age: 74
End: 2019-03-07

## 2019-03-07 NOTE — TELEPHONE ENCOUNTER
----- Message from Maryam Gabriel PA-C sent at 3/5/2019  4:23 PM EST -----  Regarding: hosp f/u  Diagnosis/Reason for follow-up:  Syncope  Subspecialty for follow-up:  General or Epileptology  Existing neurologist:  N/A  Tests/Labs/Imaging ordered:  N/A  Orders placed electronically:  N/A    Non urgent; may schedule appointment any time within the next 3 months  Thanks!

## 2019-03-13 ENCOUNTER — OFFICE VISIT (OUTPATIENT)
Dept: FAMILY MEDICINE CLINIC | Facility: CLINIC | Age: 74
End: 2019-03-13
Payer: MEDICARE

## 2019-03-13 VITALS
DIASTOLIC BLOOD PRESSURE: 90 MMHG | WEIGHT: 210.25 LBS | HEART RATE: 60 BPM | OXYGEN SATURATION: 98 % | SYSTOLIC BLOOD PRESSURE: 140 MMHG | BODY MASS INDEX: 27.87 KG/M2 | HEIGHT: 73 IN

## 2019-03-13 DIAGNOSIS — M25.512 ACUTE PAIN OF LEFT SHOULDER: Primary | ICD-10-CM

## 2019-03-13 DIAGNOSIS — M54.16 LUMBAR RADICULOPATHY: ICD-10-CM

## 2019-03-13 DIAGNOSIS — R56.9 SEIZURE-LIKE ACTIVITY (HCC): ICD-10-CM

## 2019-03-13 DIAGNOSIS — M51.26 HNP (HERNIATED NUCLEUS PULPOSUS), LUMBAR: ICD-10-CM

## 2019-03-13 DIAGNOSIS — R29.898 LEFT LEG WEAKNESS: ICD-10-CM

## 2019-03-13 DIAGNOSIS — I10 BENIGN ESSENTIAL HYPERTENSION: ICD-10-CM

## 2019-03-13 DIAGNOSIS — E78.00 HYPERCHOLESTEROLEMIA: ICD-10-CM

## 2019-03-13 DIAGNOSIS — R55 SYNCOPE, UNSPECIFIED SYNCOPE TYPE: ICD-10-CM

## 2019-03-13 DIAGNOSIS — Z98.890 S/P LUMBAR LAMINECTOMY: ICD-10-CM

## 2019-03-13 PROCEDURE — 99496 TRANSJ CARE MGMT HIGH F2F 7D: CPT | Performed by: FAMILY MEDICINE

## 2019-03-13 PROCEDURE — 1111F DSCHRG MED/CURRENT MED MERGE: CPT | Performed by: FAMILY MEDICINE

## 2019-03-13 PROCEDURE — 1160F RVW MEDS BY RX/DR IN RCRD: CPT | Performed by: FAMILY MEDICINE

## 2019-03-13 RX ORDER — ROSUVASTATIN CALCIUM 10 MG/1
10 TABLET, COATED ORAL DAILY
Qty: 90 TABLET | Refills: 3 | Status: SHIPPED | OUTPATIENT
Start: 2019-03-13 | End: 2020-01-27 | Stop reason: SDUPTHER

## 2019-03-13 NOTE — ASSESSMENT & PLAN NOTE
He appears to have a syncopal episode associated with exertion  Fortunately, his workup was negative including normal CT of his head as well as echocardiogram and EKG  Creatinine was a bit elevated speaking to probably some dehydration  This improved significantly by the next day  He will continue to monitor his blood pressure readings  I am hesitant to bump down his blood pressure medications in light of the fact that he does have occasional elevated blood pressure readings  He will continue to monitor and let me know if he is running consistently below 120/80 or above 140/90  Today his blood pressure was a bit elevated with sitting at 152/82 and upon standing it was 140/78

## 2019-03-13 NOTE — ASSESSMENT & PLAN NOTE
He appears to have an [facility] joint injury as well as probably some subacromial bursitis with swelling  He is going to try the physical therapy exercises he has from his right shoulder issues in the past   Contact me if he is not improving  Consider injection versus physical therapy versus orthopedic evaluation if not improving  Fortunately, x-ray showed no acute fracture

## 2019-03-13 NOTE — ASSESSMENT & PLAN NOTE
He is having some persistent radicular symptoms down the left lateral leg  Follow up with pain management as needed

## 2019-03-13 NOTE — PATIENT INSTRUCTIONS
Problem List Items Addressed This Visit        Cardiovascular and Mediastinum    Benign essential hypertension    Syncope     He appears to have a syncopal episode associated with exertion  Fortunately, his workup was negative including normal CT of his head as well as echocardiogram and EKG  Creatinine was a bit elevated speaking to probably some dehydration  This improved significantly by the next day  He will continue to monitor his blood pressure readings  I am hesitant to bump down his blood pressure medications in light of the fact that he does have occasional elevated blood pressure readings  He will continue to monitor and let me know if he is running consistently below 120/80 or above 140/90  Today his blood pressure was a bit elevated with sitting at 152/82 and upon standing it was 140/78  Relevant Orders    VAS carotid complete study       Nervous and Auditory    Lumbar radiculopathy     He is having some persistent radicular symptoms down the lateral aspect of his leg  He has multiple abnormalities on his lumbar spine including 1 at L2-3 which could be causing his problem  Left leg weakness       Musculoskeletal and Integument    HNP (herniated nucleus pulposus), lumbar       Other    S/P lumbar laminectomy     He is having some persistent radicular symptoms down the left lateral leg  Follow up with pain management as needed  Acute pain of left shoulder - Primary     He appears to have an [facility] joint injury as well as probably some subacromial bursitis with swelling  He is going to try the physical therapy exercises he has from his right shoulder issues in the past   Contact me if he is not improving  Consider injection versus physical therapy versus orthopedic evaluation if not improving  Fortunately, x-ray showed no acute fracture  Seizure-like activity ([address] )     He was evaluated by Neurology and felt to not have a seizure  Continue to monitor him closely  I believe he had a syncopal episode           Hypercholesterolemia     Rosuvastatin 10 mg daily         Relevant Medications    rosuvastatin (CRESTOR) 10 MG tablet    Other Relevant Orders    Comprehensive metabolic panel    Lipid Panel with Direct LDL reflex

## 2019-03-13 NOTE — PROGRESS NOTES
Assessment/Plan:       Problem List Items Addressed This Visit        Cardiovascular and Mediastinum    Benign essential hypertension    Syncope     He appears to have a syncopal episode associated with exertion  Fortunately, his workup was negative including normal CT of his head as well as echocardiogram and EKG  Creatinine was a bit elevated speaking to probably some dehydration  This improved significantly by the next day  He will continue to monitor his blood pressure readings  I am hesitant to bump down his blood pressure medications in light of the fact that he does have occasional elevated blood pressure readings  He will continue to monitor and let me know if he is running consistently below 120/80 or above 140/90  Today his blood pressure was a bit elevated with sitting at 152/82 and upon standing it was 140/78  Relevant Orders    VAS carotid complete study       Nervous and Auditory    Lumbar radiculopathy     He is having some persistent radicular symptoms down the lateral aspect of his leg  He has multiple abnormalities on his lumbar spine including 1 at L2-3 which could be causing his problem  Left leg weakness       Musculoskeletal and Integument    HNP (herniated nucleus pulposus), lumbar       Other    S/P lumbar laminectomy     He is having some persistent radicular symptoms down the left lateral leg  Follow up with pain management as needed  Acute pain of left shoulder - Primary     He appears to have an [facility] joint injury as well as probably some subacromial bursitis with swelling  He is going to try the physical therapy exercises he has from his right shoulder issues in the past   Contact me if he is not improving  Consider injection versus physical therapy versus orthopedic evaluation if not improving  Fortunately, x-ray showed no acute fracture  Seizure-like activity ([facility] [address] )     He was evaluated by Neurology and felt to not have a seizure    Continue to monitor him closely  I believe he had a syncopal episode  Hypercholesterolemia     Rosuvastatin 10 mg daily         Relevant Medications    rosuvastatin (CRESTOR) 10 MG tablet    Other Relevant Orders    Comprehensive metabolic panel    Lipid Panel with Direct LDL reflex            Subjective:   TCM Call (since 2/10/2019)     Date and time call was made  3/6/2019  8:26 AM    Hospital care reviewed  Records reviewed    Patient was hospitialized at  [facility]    Date of Admission  03/04/19    Date of discharge  03/05/19    Diagnosis  seizure-like activity     Disposition  Home    Were the patients medications reviewed and updated  Yes    Current Symptoms  Fatigue    Fatigue severity  Moderate      TCM Call (since 2/10/2019)     Should patient be enrolled in anticoag monitoring? No    Scheduled for follow up? Yes    Did you obtain your prescribed medications  Yes    Do you need help managing your prescriptions or medications  No    Is transportation to your appointment needed  No    I have advised the patient to call PCP with any new or worsening symptoms  lhunter    Living Arrangements  Spouse or Significiant other    Are you recieving any outpatient services  No    Are you recieving home care services  No    Are you using any community resources  No    Current waiver services  No    Have you fallen in the last 12 months  Yes    How many times  1    Comments  scheduled with Dr Nayak Search 3/13             Patient ID: Lang Meng is a 68 y o  male  HPI patient presents today for transitional care management  He has syncopal episode  He was admitted to [facility] as above  He notes he was shoveling and snow blowing upon awakening  He did not have anything to eat or drink that day  He is feeling somewhat fatigued and after he was all done he bent over to clean up his  and felt lightheaded and then  lost consciousness  He had no chest pain, palpitations or lightheadedness    He feels he may have been out for a few seconds  He woke having significant pain in his left shoulder but was not significantly confused  He then went to the hospital to have his previously scheduled MRI and felt extremely lightheaded upon sitting up after getting contrast   He notes that he was shaking his hands as there were somewhat tingly and was rushed to the emergency room with a concerning potential seizure presentation  Workup including CT of his head, echocardiogram, telemetry monitoring as well as labs was all pretty much normal except for mildly elevated creatinine 1 34  He was evaluated by Neurology did not feel he had a seizure  The following portions of the patient's history were reviewed and updated as appropriate: allergies, current medications, past family history, past medical history, past social history, past surgical history and problem list     Review of Systems   Constitutional: Negative for appetite change, chills, fatigue, fever and unexpected weight change  HENT: Negative for trouble swallowing  Eyes: Negative for visual disturbance  Respiratory: Negative for cough, chest tightness, shortness of breath and wheezing  Cardiovascular: Negative for chest pain  Gastrointestinal: Negative for abdominal distention, abdominal pain, blood in stool, constipation and diarrhea  Endocrine: Negative for polyuria  Genitourinary: Negative for difficulty urinating and flank pain  Musculoskeletal: Negative for arthralgias and myalgias  Skin: Negative for rash  Neurological: Positive for dizziness  Negative for light-headedness  Hematological: Negative for adenopathy  Does not bruise/bleed easily  Psychiatric/Behavioral: Negative for sleep disturbance           Objective:      /90 (BP Location: Left arm, Patient Position: Sitting, Cuff Size: Standard)   Pulse 60   Ht 6' 1" (1 854 m)   Wt 95 4 kg (210 lb 4 oz)   SpO2 98%   BMI 27 74 kg/m²          Physical Exam Constitutional: He is oriented to person, place, and time  He appears well-developed and well-nourished  No distress  HENT:   Head: Normocephalic  Eyes: Pupils are equal, round, and reactive to light  Right eye exhibits no discharge  Left eye exhibits no discharge  Neck: No tracheal deviation present  No thyromegaly present  Cardiovascular: Normal rate, regular rhythm and normal heart sounds  No murmur heard  Pulmonary/Chest: Effort normal  No respiratory distress  He has no wheezes  He has no rales  Abdominal: Soft  He exhibits no distension  There is no tenderness  Musculoskeletal: Normal range of motion  He exhibits no edema  Lymphadenopathy:     He has no cervical adenopathy  Neurological: He is alert and oriented to person, place, and time  No cranial nerve deficit  Skin: Skin is warm  He is not diaphoretic  No erythema  Psychiatric: He has a normal mood and affect   Judgment and thought content normal          Pinky Ho MD

## 2019-03-13 NOTE — ASSESSMENT & PLAN NOTE
He was evaluated by Neurology and felt to not have a seizure  Continue to monitor him closely  I believe he had a syncopal episode

## 2019-03-13 NOTE — ASSESSMENT & PLAN NOTE
He is having some persistent radicular symptoms down the lateral aspect of his leg  He has multiple abnormalities on his lumbar spine including 1 at L2-3 which could be causing his problem

## 2019-03-18 ENCOUNTER — HOSPITAL ENCOUNTER (OUTPATIENT)
Dept: NON INVASIVE DIAGNOSTICS | Facility: CLINIC | Age: 74
Discharge: HOME | End: 2019-03-18
Payer: COMMERCIAL

## 2019-03-18 DIAGNOSIS — R55 SYNCOPE, UNSPECIFIED SYNCOPE TYPE: ICD-10-CM

## 2019-03-18 PROCEDURE — 93880 EXTRACRANIAL BILAT STUDY: CPT

## 2019-03-18 PROCEDURE — 93880 EXTRACRANIAL BILAT STUDY: CPT | Performed by: SURGERY

## 2019-04-25 ENCOUNTER — OFFICE VISIT (OUTPATIENT)
Dept: NEUROLOGY | Facility: CLINIC | Age: 74
End: 2019-04-25
Payer: COMMERCIAL

## 2019-04-25 VITALS
DIASTOLIC BLOOD PRESSURE: 81 MMHG | HEART RATE: 69 BPM | HEIGHT: 73 IN | BODY MASS INDEX: 28.49 KG/M2 | SYSTOLIC BLOOD PRESSURE: 150 MMHG | WEIGHT: 215 LBS

## 2019-04-25 DIAGNOSIS — R55 SYNCOPE, UNSPECIFIED SYNCOPE TYPE: Primary | ICD-10-CM

## 2019-04-25 PROCEDURE — 99214 OFFICE O/P EST MOD 30 MIN: CPT | Performed by: PHYSICIAN ASSISTANT

## 2019-07-10 ENCOUNTER — TRANSCRIBE ORDERS (OUTPATIENT)
Dept: ADMINISTRATIVE | Age: 74
End: 2019-07-10

## 2019-07-10 ENCOUNTER — APPOINTMENT (OUTPATIENT)
Dept: LAB | Age: 74
End: 2019-07-10
Payer: COMMERCIAL

## 2019-07-10 DIAGNOSIS — E78.00 HYPERCHOLESTEROLEMIA: ICD-10-CM

## 2019-07-10 LAB
ALBUMIN SERPL BCP-MCNC: 3.9 G/DL (ref 3.5–5)
ALP SERPL-CCNC: 71 U/L (ref 46–116)
ALT SERPL W P-5'-P-CCNC: 21 U/L (ref 12–78)
ANION GAP SERPL CALCULATED.3IONS-SCNC: 5 MMOL/L (ref 4–13)
AST SERPL W P-5'-P-CCNC: 14 U/L (ref 5–45)
BILIRUB SERPL-MCNC: 1.43 MG/DL (ref 0.2–1)
BUN SERPL-MCNC: 19 MG/DL (ref 5–25)
CALCIUM SERPL-MCNC: 9.3 MG/DL (ref 8.3–10.1)
CHLORIDE SERPL-SCNC: 109 MMOL/L (ref 100–108)
CHOLEST SERPL-MCNC: 136 MG/DL (ref 50–200)
CO2 SERPL-SCNC: 27 MMOL/L (ref 21–32)
CREAT SERPL-MCNC: 1.14 MG/DL (ref 0.6–1.3)
GFR SERPL CREATININE-BSD FRML MDRD: 63 ML/MIN/1.73SQ M
GLUCOSE P FAST SERPL-MCNC: 95 MG/DL (ref 65–99)
HDLC SERPL-MCNC: 52 MG/DL (ref 40–60)
LDLC SERPL CALC-MCNC: 66 MG/DL (ref 0–100)
POTASSIUM SERPL-SCNC: 4.3 MMOL/L (ref 3.5–5.3)
PROT SERPL-MCNC: 7.2 G/DL (ref 6.4–8.2)
SODIUM SERPL-SCNC: 141 MMOL/L (ref 136–145)
TRIGL SERPL-MCNC: 92 MG/DL

## 2019-07-10 PROCEDURE — 80053 COMPREHEN METABOLIC PANEL: CPT

## 2019-07-10 PROCEDURE — 80061 LIPID PANEL: CPT

## 2019-07-10 PROCEDURE — 36415 COLL VENOUS BLD VENIPUNCTURE: CPT

## 2019-07-15 DIAGNOSIS — R17 ELEVATED BILIRUBIN: Primary | ICD-10-CM

## 2019-07-18 DIAGNOSIS — I10 BENIGN ESSENTIAL HTN: ICD-10-CM

## 2019-07-18 RX ORDER — LOSARTAN POTASSIUM 100 MG/1
TABLET ORAL DAILY
Qty: 90 TABLET | Refills: 1 | Status: SHIPPED | OUTPATIENT
Start: 2019-07-18 | End: 2019-11-25 | Stop reason: SDUPTHER

## 2019-07-29 ENCOUNTER — OFFICE VISIT (OUTPATIENT)
Dept: FAMILY MEDICINE CLINIC | Facility: CLINIC | Age: 74
End: 2019-07-29
Payer: COMMERCIAL

## 2019-07-29 VITALS
DIASTOLIC BLOOD PRESSURE: 70 MMHG | OXYGEN SATURATION: 97 % | HEIGHT: 73 IN | SYSTOLIC BLOOD PRESSURE: 138 MMHG | HEART RATE: 60 BPM | RESPIRATION RATE: 18 BRPM | WEIGHT: 210 LBS | BODY MASS INDEX: 27.83 KG/M2

## 2019-07-29 DIAGNOSIS — E78.00 HYPERCHOLESTEROLEMIA: ICD-10-CM

## 2019-07-29 DIAGNOSIS — E66.3 OVERWEIGHT WITH BODY MASS INDEX (BMI) 25.0-29.9: ICD-10-CM

## 2019-07-29 DIAGNOSIS — Z98.890 S/P LUMBAR LAMINECTOMY: ICD-10-CM

## 2019-07-29 DIAGNOSIS — M54.16 LUMBAR RADICULOPATHY: ICD-10-CM

## 2019-07-29 DIAGNOSIS — Z12.5 PROSTATE CANCER SCREENING: ICD-10-CM

## 2019-07-29 DIAGNOSIS — Z00.00 MEDICARE ANNUAL WELLNESS VISIT, SUBSEQUENT: Primary | ICD-10-CM

## 2019-07-29 DIAGNOSIS — R29.898 LEFT LEG WEAKNESS: ICD-10-CM

## 2019-07-29 DIAGNOSIS — Z11.59 NEED FOR HEPATITIS C SCREENING TEST: ICD-10-CM

## 2019-07-29 DIAGNOSIS — I10 BENIGN ESSENTIAL HYPERTENSION: ICD-10-CM

## 2019-07-29 DIAGNOSIS — Z12.11 COLON CANCER SCREENING: ICD-10-CM

## 2019-07-29 PROBLEM — M25.512 ACUTE PAIN OF LEFT SHOULDER: Status: RESOLVED | Noted: 2019-03-04 | Resolved: 2019-07-29

## 2019-07-29 PROCEDURE — 99397 PER PM REEVAL EST PAT 65+ YR: CPT | Performed by: FAMILY MEDICINE

## 2019-07-29 NOTE — PROGRESS NOTES
Assessment/Plan:       Problem List Items Addressed This Visit        Cardiovascular and Mediastinum    Benign essential hypertension    Relevant Orders    CBC and differential       Nervous and Auditory    Lumbar radiculopathy    Relevant Orders    EMG 2 limb lower extremity    Left leg weakness    Relevant Orders    EMG 2 limb lower extremity       Other    S/P lumbar laminectomy    Hypercholesterolemia    Relevant Orders    Comprehensive metabolic panel    Lipid Panel with Direct LDL reflex      Other Visit Diagnoses     Annual physical exam    -  Primary    Overweight with body mass index (BMI) 25 0-29 9        Need for hepatitis C screening test        Relevant Orders    Hepatitis C antibody    Prostate cancer screening        Relevant Orders    PSA, Total Screen    Colon cancer screening        He is planning on following with [facility]  He showed me the letter today  BMI Counseling: Body mass index is 27 71 kg/m²  Discussed the patient's BMI with him  The BMI is above average  BMI counseling and education was provided to the patient  Nutrition recommendations include reducing portion sizes, decreasing overall calorie intake and 3-5 servings of fruits/vegetables daily  Exercise recommendations include moderate aerobic physical activity for 150 minutes/week  Patient is doing well overall  We discussed the importance of continuing of his exercise  He is going to try to lose a few lb  He will be getting a colonoscopy through  [facility] as he has seen them in the past   He had a prostate exam today  PSA will be checked did 6 months  Vaccines are up-to-date  Subjective:      Patient ID: Jovany Escobar is a 68 y o  male  HPI patient presents today for annual physical   Overall, he is doing well  He continues to exercise routinely  He has no complaints today except for ongoing weakness and lateral numbness of his left leg  This began after his lumbar surgery several years ago  Unfortunately, he does feel that this gets weak on occasion and he is utilizing a cane if he is going any significant distance  He is swimming routinely for exercise without any cardiovascular limitations  He has been increasing his hydration and has not noted any further lightheadedness or syncope  He has a history of BPH but is not having nocturia, dysuria or urinary hesitancy  He denies erectile dysfunction  The patient presents today for a hypertension follow-up  Patient remains compliant with medications and denies any chest pain, shortness of breath, palpitations, lightheadedness or diaphoresis  He does remain on statin medication without myalgias  The following portions of the patient's history were reviewed and updated as appropriate: allergies, current medications, past family history, past medical history, past social history, past surgical history and problem list       Current Outpatient Medications:     amLODIPine (NORVASC) 5 mg tablet, Take 1 tablet (5 mg total) by mouth daily, Disp: 90 tablet, Rfl: 3    ascorbic acid (VITAMIN C) 1000 MG tablet, Take 1,000 mg by mouth, Disp: , Rfl:     cetirizine (ZyrTEC) 10 mg tablet, Take 1 tablet by mouth, Disp: , Rfl:     DAILY MULTIPLE VITAMINS tablet, Take 1 tablet by mouth daily, Disp: , Rfl:     glucosamine-chondroitin 500-400 MG tablet, Take 1 tablet by mouth daily, Disp: , Rfl:     losartan (COZAAR) 100 MG tablet, TAKE 1 TABLET BY MOUTH DAILY, Disp: 90 tablet, Rfl: 1    rosuvastatin (CRESTOR) 10 MG tablet, Take 1 tablet (10 mg total) by mouth daily, Disp: 90 tablet, Rfl: 3     Review of Systems   Constitutional: Negative for appetite change, chills, fatigue, fever and unexpected weight change  HENT: Negative for trouble swallowing  Eyes: Negative for visual disturbance  Respiratory: Negative for cough, chest tightness, shortness of breath and wheezing  Cardiovascular: Negative for chest pain     Gastrointestinal: Negative for abdominal distention, abdominal pain, blood in stool, constipation and diarrhea  Endocrine: Negative for polyuria  Genitourinary: Negative for difficulty urinating and flank pain  Musculoskeletal: Positive for gait problem (Due to chronic left lateral leg numbness with some weakness of the leg )  Negative for arthralgias and myalgias  Skin: Negative for rash  Neurological: Negative for dizziness and light-headedness  Hematological: Negative for adenopathy  Does not bruise/bleed easily  Psychiatric/Behavioral: Negative for sleep disturbance  Objective:      /70   Pulse 60   Resp 18   Ht 6' 1" (1 854 m)   Wt 95 3 kg (210 lb)   SpO2 97%   BMI 27 71 kg/m²          Physical Exam   Constitutional: He is oriented to person, place, and time  He appears well-developed and well-nourished  No distress  HENT:   Head: Normocephalic  Eyes: Pupils are equal, round, and reactive to light  Right eye exhibits no discharge  Left eye exhibits no discharge  Neck: No tracheal deviation present  No thyromegaly present  Cardiovascular: Normal rate, regular rhythm and normal heart sounds  No murmur heard  Pulmonary/Chest: Effort normal  No respiratory distress  He has no wheezes  He has no rales  Abdominal: Soft  He exhibits no distension  There is no tenderness  Genitourinary: Prostate is enlarged  Prostate is not tender  Musculoskeletal: Normal range of motion  He exhibits no edema  Lymphadenopathy:     He has no cervical adenopathy  Neurological: He is alert and oriented to person, place, and time  No cranial nerve deficit  Skin: Skin is warm  He is not diaphoretic  No erythema  Psychiatric: He has a normal mood and affect   Judgment and thought content normal          Vilma Larose MD

## 2019-08-13 ENCOUNTER — APPOINTMENT (OUTPATIENT)
Dept: LAB | Age: 74
End: 2019-08-13
Payer: COMMERCIAL

## 2019-08-13 DIAGNOSIS — R17 ELEVATED BILIRUBIN: ICD-10-CM

## 2019-08-13 LAB
ALBUMIN SERPL BCP-MCNC: 3.8 G/DL (ref 3.5–5)
ALP SERPL-CCNC: 72 U/L (ref 46–116)
ALT SERPL W P-5'-P-CCNC: 19 U/L (ref 12–78)
ANION GAP SERPL CALCULATED.3IONS-SCNC: 3 MMOL/L (ref 4–13)
AST SERPL W P-5'-P-CCNC: 17 U/L (ref 5–45)
BILIRUB SERPL-MCNC: 0.77 MG/DL (ref 0.2–1)
BUN SERPL-MCNC: 19 MG/DL (ref 5–25)
CALCIUM SERPL-MCNC: 8.9 MG/DL (ref 8.3–10.1)
CHLORIDE SERPL-SCNC: 108 MMOL/L (ref 100–108)
CO2 SERPL-SCNC: 27 MMOL/L (ref 21–32)
CREAT SERPL-MCNC: 1.22 MG/DL (ref 0.6–1.3)
GFR SERPL CREATININE-BSD FRML MDRD: 58 ML/MIN/1.73SQ M
GLUCOSE P FAST SERPL-MCNC: 95 MG/DL (ref 65–99)
POTASSIUM SERPL-SCNC: 4.3 MMOL/L (ref 3.5–5.3)
PROT SERPL-MCNC: 7.1 G/DL (ref 6.4–8.2)
SODIUM SERPL-SCNC: 138 MMOL/L (ref 136–145)

## 2019-08-13 PROCEDURE — 36415 COLL VENOUS BLD VENIPUNCTURE: CPT

## 2019-08-13 PROCEDURE — 80053 COMPREHEN METABOLIC PANEL: CPT

## 2019-10-29 ENCOUNTER — HOSPITAL ENCOUNTER (OUTPATIENT)
Dept: NEUROLOGY | Facility: CLINIC | Age: 74
Discharge: HOME | End: 2019-10-29
Payer: COMMERCIAL

## 2019-10-29 DIAGNOSIS — R29.898 LEFT LEG WEAKNESS: ICD-10-CM

## 2019-10-29 DIAGNOSIS — M54.16 LUMBAR RADICULOPATHY: ICD-10-CM

## 2019-10-29 PROCEDURE — 95886 MUSC TEST DONE W/N TEST COMP: CPT | Performed by: PSYCHIATRY & NEUROLOGY

## 2019-10-29 PROCEDURE — 95909 NRV CNDJ TST 5-6 STUDIES: CPT | Performed by: PSYCHIATRY & NEUROLOGY

## 2019-11-07 ENCOUNTER — TELEPHONE (OUTPATIENT)
Dept: FAMILY MEDICINE CLINIC | Facility: CLINIC | Age: 74
End: 2019-11-07

## 2019-11-07 DIAGNOSIS — M54.16 LUMBAR RADICULOPATHY: Primary | ICD-10-CM

## 2019-11-07 DIAGNOSIS — R29.898 LEFT LEG WEAKNESS: ICD-10-CM

## 2019-11-07 DIAGNOSIS — R94.131 ABNORMAL EMG: ICD-10-CM

## 2019-11-08 NOTE — TELEPHONE ENCOUNTER
Pt notified of results and referral entered to neurosurgery  Pt will call if he does not hear from neurosurgery early next week

## 2019-11-08 NOTE — TELEPHONE ENCOUNTER
----- Message from Navin Hernandez MD sent at 10/30/2019  9:00 AM EDT -----  Call patient regarding test  EMG shows active issues with his nerve from his back  Please help to set up eval with neurosurg to help guide next steps

## 2019-11-25 DIAGNOSIS — I10 BENIGN ESSENTIAL HTN: ICD-10-CM

## 2019-11-25 DIAGNOSIS — I10 HYPERTENSION, UNSPECIFIED TYPE: ICD-10-CM

## 2019-11-25 RX ORDER — LOSARTAN POTASSIUM 100 MG/1
TABLET ORAL DAILY
Qty: 90 TABLET | Refills: 0 | Status: SHIPPED | OUTPATIENT
Start: 2019-11-25 | End: 2020-01-27 | Stop reason: SDUPTHER

## 2019-11-25 RX ORDER — AMLODIPINE BESYLATE 5 MG/1
TABLET ORAL
Qty: 90 TABLET | Refills: 0 | Status: SHIPPED | OUTPATIENT
Start: 2019-11-25 | End: 2020-01-27 | Stop reason: SDUPTHER

## 2019-12-19 ENCOUNTER — TRANSCRIBE ORDERS (OUTPATIENT)
Dept: ADMINISTRATIVE | Age: 74
End: 2019-12-19

## 2019-12-19 ENCOUNTER — APPOINTMENT (OUTPATIENT)
Dept: LAB | Age: 74
End: 2019-12-19
Payer: COMMERCIAL

## 2019-12-19 DIAGNOSIS — I10 BENIGN ESSENTIAL HYPERTENSION: ICD-10-CM

## 2019-12-19 DIAGNOSIS — E78.00 HYPERCHOLESTEROLEMIA: ICD-10-CM

## 2019-12-19 DIAGNOSIS — Z11.59 NEED FOR HEPATITIS C SCREENING TEST: ICD-10-CM

## 2019-12-19 DIAGNOSIS — Z12.5 PROSTATE CANCER SCREENING: ICD-10-CM

## 2019-12-19 LAB
ALBUMIN SERPL BCP-MCNC: 4.2 G/DL (ref 3.5–5)
ALP SERPL-CCNC: 73 U/L (ref 46–116)
ALT SERPL W P-5'-P-CCNC: 25 U/L (ref 12–78)
ANION GAP SERPL CALCULATED.3IONS-SCNC: 4 MMOL/L (ref 4–13)
AST SERPL W P-5'-P-CCNC: 17 U/L (ref 5–45)
BASOPHILS # BLD AUTO: 0.09 THOUSANDS/ΜL (ref 0–0.1)
BASOPHILS NFR BLD AUTO: 1 % (ref 0–1)
BILIRUB SERPL-MCNC: 1.75 MG/DL (ref 0.2–1)
BUN SERPL-MCNC: 16 MG/DL (ref 5–25)
CALCIUM SERPL-MCNC: 9.6 MG/DL (ref 8.3–10.1)
CHLORIDE SERPL-SCNC: 105 MMOL/L (ref 100–108)
CHOLEST SERPL-MCNC: 146 MG/DL (ref 50–200)
CO2 SERPL-SCNC: 29 MMOL/L (ref 21–32)
CREAT SERPL-MCNC: 1.28 MG/DL (ref 0.6–1.3)
EOSINOPHIL # BLD AUTO: 0.47 THOUSAND/ΜL (ref 0–0.61)
EOSINOPHIL NFR BLD AUTO: 7 % (ref 0–6)
ERYTHROCYTE [DISTWIDTH] IN BLOOD BY AUTOMATED COUNT: 13.5 % (ref 11.6–15.1)
GFR SERPL CREATININE-BSD FRML MDRD: 55 ML/MIN/1.73SQ M
GLUCOSE P FAST SERPL-MCNC: 92 MG/DL (ref 65–99)
HCT VFR BLD AUTO: 47.9 % (ref 36.5–49.3)
HCV AB SER QL: NORMAL
HDLC SERPL-MCNC: 56 MG/DL
HGB BLD-MCNC: 15.4 G/DL (ref 12–17)
IMM GRANULOCYTES # BLD AUTO: 0.01 THOUSAND/UL (ref 0–0.2)
IMM GRANULOCYTES NFR BLD AUTO: 0 % (ref 0–2)
LDLC SERPL CALC-MCNC: 70 MG/DL (ref 0–100)
LYMPHOCYTES # BLD AUTO: 1.73 THOUSANDS/ΜL (ref 0.6–4.47)
LYMPHOCYTES NFR BLD AUTO: 26 % (ref 14–44)
MCH RBC QN AUTO: 30 PG (ref 26.8–34.3)
MCHC RBC AUTO-ENTMCNC: 32.2 G/DL (ref 31.4–37.4)
MCV RBC AUTO: 93 FL (ref 82–98)
MONOCYTES # BLD AUTO: 0.5 THOUSAND/ΜL (ref 0.17–1.22)
MONOCYTES NFR BLD AUTO: 8 % (ref 4–12)
NEUTROPHILS # BLD AUTO: 3.86 THOUSANDS/ΜL (ref 1.85–7.62)
NEUTS SEG NFR BLD AUTO: 58 % (ref 43–75)
NRBC BLD AUTO-RTO: 0 /100 WBCS
PLATELET # BLD AUTO: 302 THOUSANDS/UL (ref 149–390)
PMV BLD AUTO: 11.3 FL (ref 8.9–12.7)
POTASSIUM SERPL-SCNC: 4.2 MMOL/L (ref 3.5–5.3)
PROT SERPL-MCNC: 7.5 G/DL (ref 6.4–8.2)
PSA SERPL-MCNC: 0.9 NG/ML (ref 0–4)
RBC # BLD AUTO: 5.14 MILLION/UL (ref 3.88–5.62)
SODIUM SERPL-SCNC: 138 MMOL/L (ref 136–145)
TRIGL SERPL-MCNC: 98 MG/DL
WBC # BLD AUTO: 6.66 THOUSAND/UL (ref 4.31–10.16)

## 2019-12-19 PROCEDURE — 80053 COMPREHEN METABOLIC PANEL: CPT

## 2019-12-19 PROCEDURE — 36415 COLL VENOUS BLD VENIPUNCTURE: CPT

## 2019-12-19 PROCEDURE — G0103 PSA SCREENING: HCPCS

## 2019-12-19 PROCEDURE — 80061 LIPID PANEL: CPT

## 2019-12-19 PROCEDURE — 86803 HEPATITIS C AB TEST: CPT

## 2019-12-19 PROCEDURE — 85025 COMPLETE CBC W/AUTO DIFF WBC: CPT

## 2020-01-27 ENCOUNTER — OFFICE VISIT (OUTPATIENT)
Dept: FAMILY MEDICINE CLINIC | Facility: CLINIC | Age: 75
End: 2020-01-27
Payer: MEDICARE

## 2020-01-27 VITALS
DIASTOLIC BLOOD PRESSURE: 72 MMHG | BODY MASS INDEX: 29.08 KG/M2 | HEART RATE: 76 BPM | SYSTOLIC BLOOD PRESSURE: 132 MMHG | OXYGEN SATURATION: 99 % | HEIGHT: 73 IN | WEIGHT: 219.38 LBS

## 2020-01-27 DIAGNOSIS — J30.9 ALLERGIC RHINITIS, UNSPECIFIED SEASONALITY, UNSPECIFIED TRIGGER: ICD-10-CM

## 2020-01-27 DIAGNOSIS — R29.898 LEFT LEG WEAKNESS: ICD-10-CM

## 2020-01-27 DIAGNOSIS — I10 BENIGN ESSENTIAL HYPERTENSION: ICD-10-CM

## 2020-01-27 DIAGNOSIS — E66.3 OVERWEIGHT WITH BODY MASS INDEX (BMI) 25.0-29.9: ICD-10-CM

## 2020-01-27 DIAGNOSIS — M25.512 CHRONIC LEFT SHOULDER PAIN: ICD-10-CM

## 2020-01-27 DIAGNOSIS — I65.22 STENOSIS OF LEFT CAROTID ARTERY: ICD-10-CM

## 2020-01-27 DIAGNOSIS — Z12.11 COLON CANCER SCREENING: ICD-10-CM

## 2020-01-27 DIAGNOSIS — I10 BENIGN ESSENTIAL HTN: Primary | ICD-10-CM

## 2020-01-27 DIAGNOSIS — I10 HYPERTENSION, UNSPECIFIED TYPE: ICD-10-CM

## 2020-01-27 DIAGNOSIS — N40.0 BENIGN PROSTATIC HYPERPLASIA WITHOUT LOWER URINARY TRACT SYMPTOMS: ICD-10-CM

## 2020-01-27 DIAGNOSIS — E78.00 HYPERCHOLESTEROLEMIA: ICD-10-CM

## 2020-01-27 DIAGNOSIS — G89.29 CHRONIC LEFT SHOULDER PAIN: ICD-10-CM

## 2020-01-27 PROCEDURE — 99214 OFFICE O/P EST MOD 30 MIN: CPT | Performed by: FAMILY MEDICINE

## 2020-01-27 RX ORDER — FLUTICASONE PROPIONATE 50 MCG
2 SPRAY, SUSPENSION (ML) NASAL DAILY
Qty: 3 BOTTLE | Refills: 3 | Status: SHIPPED | OUTPATIENT
Start: 2020-01-27 | End: 2021-01-06

## 2020-01-27 RX ORDER — LOSARTAN POTASSIUM 100 MG/1
100 TABLET ORAL DAILY
Qty: 90 TABLET | Refills: 3 | Status: SHIPPED | OUTPATIENT
Start: 2020-01-27 | End: 2021-01-18 | Stop reason: SDUPTHER

## 2020-01-27 RX ORDER — AMLODIPINE BESYLATE 5 MG/1
5 TABLET ORAL DAILY
Qty: 90 TABLET | Refills: 3 | Status: SHIPPED | OUTPATIENT
Start: 2020-01-27 | End: 2021-01-18 | Stop reason: SDUPTHER

## 2020-01-27 RX ORDER — ROSUVASTATIN CALCIUM 10 MG/1
10 TABLET, COATED ORAL DAILY
Qty: 90 TABLET | Refills: 3 | Status: SHIPPED | OUTPATIENT
Start: 2020-01-27 | End: 2021-01-18 | Stop reason: SDUPTHER

## 2020-01-27 NOTE — ASSESSMENT & PLAN NOTE
He is still having some persistent pain in the left shoulder when he lies on his left side  I offered physical therapy at this time verses orthopedic evaluation but he would like to hold off on that

## 2020-01-27 NOTE — PROGRESS NOTES
Assessment/Plan:       Problem List Items Addressed This Visit        Respiratory    Allergic rhinitis    Relevant Medications    fluticasone (FLONASE) 50 mcg/act nasal spray       Cardiovascular and Mediastinum    Benign essential hypertension    Relevant Medications    amLODIPine (NORVASC) 5 mg tablet    losartan (COZAAR) 100 MG tablet    Stenosis of left carotid artery     Repeat carotid duplex after his follow-up in 6 months  Nervous and Auditory    Left leg weakness       Genitourinary    Benign enlargement of prostate       Other    Left shoulder pain     He is still having some persistent pain in the left shoulder when he lies on his left side  I offered physical therapy at this time verses orthopedic evaluation but he would like to hold off on that  Hypercholesterolemia    Relevant Medications    rosuvastatin (CRESTOR) 10 MG tablet    Other Relevant Orders    Comprehensive metabolic panel    CBC and differential      Other Visit Diagnoses     Benign essential HTN    -  Primary    Relevant Medications    amLODIPine (NORVASC) 5 mg tablet    losartan (COZAAR) 100 MG tablet    Other Relevant Orders    Comprehensive metabolic panel    CBC and differential    Overweight with body mass index (BMI) 25 0-29 9        Colon cancer screening        Relevant Orders    Ambulatory referral to Gastroenterology    Hypertension, unspecified type        Relevant Medications    amLODIPine (NORVASC) 5 mg tablet    losartan (COZAAR) 100 MG tablet            Subjective:      Patient ID: Beth Cloud is a 76 y o  male  HPI The patient presents today for a hypertension follow-up  Patient remains compliant with medications and denies any chest pain, shortness of breath, palpitations, lightheadedness or diaphoresis  He has hyperlipidemia tolerate statin therapy without significant myalgias  He is having some pain in his left shoulder    He notes when he had his syncopal episode last year he did injure his left shoulder  He still has some pain when he lies on the left side  Otherwise, he is able to swim and perform other activities without too much difficulty  His low back pain has improved significantly  He does continue to follow with his neurosurgeon  BPH remained stable without excessive voiding issues  He continues to have an elevated bilirubin  The following portions of the patient's history were reviewed and updated as appropriate: allergies, current medications, past family history, past medical history, past social history, past surgical history and problem list       Current Outpatient Medications:     amLODIPine (NORVASC) 5 mg tablet, Take 1 tablet (5 mg total) by mouth daily, Disp: 90 tablet, Rfl: 3    ascorbic acid (VITAMIN C) 1000 MG tablet, Take 1,000 mg by mouth, Disp: , Rfl:     cetirizine (ZyrTEC) 10 mg tablet, Take 1 tablet by mouth, Disp: , Rfl:     DAILY MULTIPLE VITAMINS tablet, Take 1 tablet by mouth daily, Disp: , Rfl:     glucosamine-chondroitin 500-400 MG tablet, Take 1 tablet by mouth daily, Disp: , Rfl:     losartan (COZAAR) 100 MG tablet, Take 1 tablet (100 mg total) by mouth daily, Disp: 90 tablet, Rfl: 3    rosuvastatin (CRESTOR) 10 MG tablet, Take 1 tablet (10 mg total) by mouth daily, Disp: 90 tablet, Rfl: 3    fluticasone (FLONASE) 50 mcg/act nasal spray, 2 sprays into each nostril daily, Disp: 3 Bottle, Rfl: 3     Review of Systems   Constitutional: Negative for appetite change, chills, fatigue, fever and unexpected weight change  HENT: Positive for congestion  Negative for trouble swallowing  Eyes: Negative for visual disturbance  Respiratory: Negative for cough, chest tightness, shortness of breath and wheezing  Cardiovascular: Negative for chest pain  Gastrointestinal: Negative for abdominal distention, abdominal pain, blood in stool, constipation and diarrhea  Endocrine: Negative for polyuria     Genitourinary: Negative for difficulty urinating and flank pain  Musculoskeletal: Positive for back pain (on occasion)  Negative for arthralgias and myalgias  Skin: Negative for rash  Neurological: Negative for dizziness and light-headedness  Hematological: Negative for adenopathy  Does not bruise/bleed easily  Psychiatric/Behavioral: Negative for sleep disturbance  Objective:      /72 (BP Location: Left arm, Patient Position: Sitting, Cuff Size: Standard)   Pulse 76   Ht 6' 1 2" (1 859 m)   Wt 99 5 kg (219 lb 6 oz)   SpO2 99%   BMI 28 79 kg/m²          Physical Exam   Constitutional: He is oriented to person, place, and time  He appears well-developed and well-nourished  No distress  HENT:   Head: Normocephalic  Eyes: Pupils are equal, round, and reactive to light  Right eye exhibits no discharge  Left eye exhibits no discharge  Neck: No tracheal deviation present  No thyromegaly present  Cardiovascular: Normal rate, regular rhythm and normal heart sounds  No murmur heard  Pulmonary/Chest: Effort normal  No respiratory distress  He has no wheezes  He has no rales  Abdominal: Soft  He exhibits no distension  There is no tenderness  Musculoskeletal: Normal range of motion  He exhibits no edema  Lymphadenopathy:     He has no cervical adenopathy  Neurological: He is alert and oriented to person, place, and time  No cranial nerve deficit  Skin: Skin is warm  He is not diaphoretic  No erythema  Psychiatric: He has a normal mood and affect   Judgment and thought content normal          Kelvin Irizarry MD

## 2020-01-31 ENCOUNTER — CONSULT (OUTPATIENT)
Dept: NEUROSURGERY | Facility: CLINIC | Age: 75
End: 2020-01-31
Payer: COMMERCIAL

## 2020-01-31 VITALS
TEMPERATURE: 97.3 F | BODY MASS INDEX: 28.49 KG/M2 | HEIGHT: 73 IN | WEIGHT: 215 LBS | DIASTOLIC BLOOD PRESSURE: 92 MMHG | RESPIRATION RATE: 16 BRPM | SYSTOLIC BLOOD PRESSURE: 170 MMHG | HEART RATE: 57 BPM

## 2020-01-31 DIAGNOSIS — R94.131 ABNORMAL EMG: ICD-10-CM

## 2020-01-31 DIAGNOSIS — M54.16 LUMBAR RADICULOPATHY: ICD-10-CM

## 2020-01-31 DIAGNOSIS — R29.898 LEFT LEG WEAKNESS: ICD-10-CM

## 2020-01-31 PROCEDURE — 3077F SYST BP >= 140 MM HG: CPT | Performed by: NEUROLOGICAL SURGERY

## 2020-01-31 PROCEDURE — 3080F DIAST BP >= 90 MM HG: CPT | Performed by: NEUROLOGICAL SURGERY

## 2020-01-31 PROCEDURE — 3008F BODY MASS INDEX DOCD: CPT | Performed by: NEUROLOGICAL SURGERY

## 2020-01-31 PROCEDURE — 99244 OFF/OP CNSLTJ NEW/EST MOD 40: CPT | Performed by: NEUROLOGICAL SURGERY

## 2020-01-31 NOTE — PROGRESS NOTES
Assessment/Plan:    No problem-specific Assessment & Plan notes found for this encounter  History, physical examination and diagnostic tests were reviewed and questions answered  Diagnosis, care plan and treatment options were discussed  The patient understand instructions and will follow up as directed  Patient with left anterior thigh pain he had prior surgery in 2018 at [address]  He only had residual numbness, repeat Mri showed some L3-4 listhesis and he had an EMG with chronic radiculopathy noted  He has subsequently developed left anterior thigh pain  He reports that this is bearable but does note some subjective weakness  Given that these symptoms are bearable I recommend conservative management for now  He can try Cymbalta/Lyrica he had side effects with gabapentin  The next option would be injections through pain management  If these fail he can notify us for further intervention  IMAGING REVIEWED: MRI lumbar shows listhesis at L3-4 with bilateral lateral recess and foraminal stenosis       Diagnoses and all orders for this visit:    Lumbar radiculopathy  -     Ambulatory referral to Neurosurgery    Left leg weakness  -     Ambulatory referral to Neurosurgery    Abnormal EMG  -     Ambulatory referral to Neurosurgery          Subjective:      Patient ID: Angelito Malik is a 76 y o  male  Patient is a 76year old male with symptoms of left lower leg pain  Pain is moderate and paraesthetic in quality  Pain distribution is right anterior thigh down to the knee  Pain is worse with activity  The pain has been present since his EMG  Pain has associated only mild distress he otherwise reports that the pain is bearable at this time  The patient underwent the following conservative treatments including PT and medication trials  The patient reported symptoms started when he had his EMG completed before he only had some numbness        The following portions of the patient's history were reviewed and updated as appropriate:   He  has a past medical history of Benign essential hypertension, Broken ribs, and Chest pain  He   Patient Active Problem List    Diagnosis Date Noted    Stenosis of left carotid artery 01/27/2020    Hypercholesterolemia 03/13/2019    Syncope 03/04/2019    Left shoulder pain 03/04/2019    Left leg weakness 01/25/2019    Actinic keratoses 06/20/2018    S/P lumbar laminectomy 05/15/2018    HNP (herniated nucleus pulposus), lumbar 04/16/2018    Lumbar radiculopathy 03/28/2018    Abnormal findings on diagnostic imaging of other specified body structures 01/22/2018    Allergic rhinitis 12/18/2012    Benign enlargement of prostate 12/18/2012    Osteoarthritis 12/18/2012    Benign essential hypertension 08/27/2012     He  has a past surgical history that includes Tonsillectomy; Lumbar laminectomy (Bilateral, 04/2018); and Appendectomy  His family history includes Cancer in his father; Colon cancer in his mother; Other in his father; Ovarian cancer in his sister; Thyroid cancer in his father  He  reports that he quit smoking about 60 years ago  He has never used smokeless tobacco  He reports that he drinks alcohol  He reports that he has current or past drug history    Current Outpatient Medications   Medication Sig Dispense Refill    amLODIPine (NORVASC) 5 mg tablet Take 1 tablet (5 mg total) by mouth daily 90 tablet 3    ascorbic acid (VITAMIN C) 1000 MG tablet Take 1,000 mg by mouth      cetirizine (ZyrTEC) 10 mg tablet Take 1 tablet by mouth      DAILY MULTIPLE VITAMINS tablet Take 1 tablet by mouth daily      fluticasone (FLONASE) 50 mcg/act nasal spray 2 sprays into each nostril daily 3 Bottle 3    glucosamine-chondroitin 500-400 MG tablet Take 1 tablet by mouth daily      losartan (COZAAR) 100 MG tablet Take 1 tablet (100 mg total) by mouth daily 90 tablet 3    rosuvastatin (CRESTOR) 10 MG tablet Take 1 tablet (10 mg total) by mouth daily 90 tablet 3     No current facility-administered medications for this visit  Current Outpatient Medications on File Prior to Visit   Medication Sig    amLODIPine (NORVASC) 5 mg tablet Take 1 tablet (5 mg total) by mouth daily    ascorbic acid (VITAMIN C) 1000 MG tablet Take 1,000 mg by mouth    cetirizine (ZyrTEC) 10 mg tablet Take 1 tablet by mouth    DAILY MULTIPLE VITAMINS tablet Take 1 tablet by mouth daily    fluticasone (FLONASE) 50 mcg/act nasal spray 2 sprays into each nostril daily    glucosamine-chondroitin 500-400 MG tablet Take 1 tablet by mouth daily    losartan (COZAAR) 100 MG tablet Take 1 tablet (100 mg total) by mouth daily    rosuvastatin (CRESTOR) 10 MG tablet Take 1 tablet (10 mg total) by mouth daily     No current facility-administered medications on file prior to visit  He is allergic to diazepam and gabapentin       Review of Systems   Constitutional: Positive for fatigue  HENT: Negative  Eyes: Negative  Respiratory: Negative  Cardiovascular: Negative  Gastrointestinal: Negative  Endocrine: Negative  Genitourinary: Negative  Musculoskeletal:        Left outer thigh N&,T  just to knee, no pain reported   Allergic/Immunologic: Negative  Neurological: Positive for weakness (left leg, upper) and numbness (left outer thigh)  Hematological: Negative  I have personally reviewed all aspects of the review of systems as documented    Objective:      /92 (BP Location: Left arm)   Pulse 57   Temp (!) 97 3 °F (36 3 °C) (Tympanic)   Resp 16   Ht 6' 1" (1 854 m)   Wt 97 5 kg (215 lb)   BMI 28 37 kg/m²          Physical Exam   Constitutional: He is oriented to person, place, and time  He appears well-developed and well-nourished  No distress  HENT:   Head: Normocephalic and atraumatic  Nose: Nose normal    Eyes: Pupils are equal, round, and reactive to light  Conjunctivae and EOM are normal  Right eye exhibits no discharge  Left eye exhibits no discharge   No scleral icterus  Neck: Normal range of motion  No tracheal deviation present  No thyromegaly present  Cardiovascular: Normal rate  Pulmonary/Chest: Effort normal and breath sounds normal  No stridor  No respiratory distress  Abdominal: Soft  Musculoskeletal: Normal range of motion  He exhibits no edema, tenderness or deformity  Neurological: He is alert and oriented to person, place, and time  He has normal strength  No cranial nerve deficit or sensory deficit  Skin: Skin is warm and dry  No rash noted  He is not diaphoretic  No erythema  No pallor  Psychiatric: He has a normal mood and affect   His behavior is normal  Judgment and thought content normal

## 2020-01-31 NOTE — LETTER
January 31, 2020     Inis Leventhal , MD  [id number] [facility] [address]    Patient: Lise Anderson   YOB: 1945   Date of Visit: 1/31/2020       Dear Dr Ashley Hernandez: Thank you for referring Maricarmen Bocanegra to me for evaluation  Below are my notes for this consultation  If you have questions, please do not hesitate to call me  I look forward to following your patient along with you  Sincerely,        Marcelo Llanos MD        CC: No Recipients  Marcelo Llanos MD  1/31/2020  9:37 AM  Sign at close encounter  Assessment/Plan:    No problem-specific Assessment & Plan notes found for this encounter  History, physical examination and diagnostic tests were reviewed and questions answered  Diagnosis, care plan and treatment options were discussed  The patient understand instructions and will follow up as directed  Patient with left anterior thigh pain he had prior surgery in 2018 at [facility]  He only had residual numbness, repeat Mri showed some L3-4 listhesis and he had an EMG with chronic radiculopathy noted  He has subsequently developed left anterior thigh pain  He reports that this is bearable but does note some subjective weakness  Given that these symptoms are bearable I recommend conservative management for now  He can try Cymbalta/Lyrica he had side effects with gabapentin  The next option would be injections through pain management  If these fail he can notify us for further intervention  IMAGING REVIEWED: MRI lumbar shows listhesis at L3-4 with bilateral lateral recess and foraminal stenosis       Diagnoses and all orders for this visit:    Lumbar radiculopathy  -     Ambulatory referral to Neurosurgery    Left leg weakness  -     Ambulatory referral to Neurosurgery    Abnormal EMG  -     Ambulatory referral to Neurosurgery          Subjective:      Patient ID: Lise Anderson is a 76 y o  male      Patient is a 76year old male with symptoms of left lower leg pain  Pain is moderate and paraesthetic in quality  Pain distribution is right anterior thigh down to the knee  Pain is worse with activity  The pain has been present since his EMG  Pain has associated only mild distress he otherwise reports that the pain is bearable at this time  The patient underwent the following conservative treatments including PT and medication trials  The patient reported symptoms started when he had his EMG completed before he only had some numbness  The following portions of the patient's history were reviewed and updated as appropriate:   He  has a past medical history of Benign essential hypertension, Broken ribs, and Chest pain  He   Patient Active Problem List    Diagnosis Date Noted    Stenosis of left carotid artery 01/27/2020    Hypercholesterolemia 03/13/2019    Syncope 03/04/2019    Left shoulder pain 03/04/2019    Left leg weakness 01/25/2019    Actinic keratoses 06/20/2018    S/P lumbar laminectomy 05/15/2018    HNP (herniated nucleus pulposus), lumbar 04/16/2018    Lumbar radiculopathy 03/28/2018    Abnormal findings on diagnostic imaging of other specified body structures 01/22/2018    Allergic rhinitis 12/18/2012    Benign enlargement of prostate 12/18/2012    Osteoarthritis 12/18/2012    Benign essential hypertension 08/27/2012     He  has a past surgical history that includes Tonsillectomy; Lumbar laminectomy (Bilateral, 04/2018); and Appendectomy  His family history includes Cancer in his father; Colon cancer in his mother; Other in his father; Ovarian cancer in his sister; Thyroid cancer in his father  He  reports that he quit smoking about 60 years ago  He has never used smokeless tobacco  He reports that he drinks alcohol  He reports that he has current or past drug history    Current Outpatient Medications   Medication Sig Dispense Refill    amLODIPine (NORVASC) 5 mg tablet Take 1 tablet (5 mg total) by mouth daily 90 tablet 3    ascorbic acid (VITAMIN C) 1000 MG tablet Take 1,000 mg by mouth      cetirizine (ZyrTEC) 10 mg tablet Take 1 tablet by mouth      DAILY MULTIPLE VITAMINS tablet Take 1 tablet by mouth daily      fluticasone (FLONASE) 50 mcg/act nasal spray 2 sprays into each nostril daily 3 Bottle 3    glucosamine-chondroitin 500-400 MG tablet Take 1 tablet by mouth daily      losartan (COZAAR) 100 MG tablet Take 1 tablet (100 mg total) by mouth daily 90 tablet 3    rosuvastatin (CRESTOR) 10 MG tablet Take 1 tablet (10 mg total) by mouth daily 90 tablet 3     No current facility-administered medications for this visit  Current Outpatient Medications on File Prior to Visit   Medication Sig    amLODIPine (NORVASC) 5 mg tablet Take 1 tablet (5 mg total) by mouth daily    ascorbic acid (VITAMIN C) 1000 MG tablet Take 1,000 mg by mouth    cetirizine (ZyrTEC) 10 mg tablet Take 1 tablet by mouth    DAILY MULTIPLE VITAMINS tablet Take 1 tablet by mouth daily    fluticasone (FLONASE) 50 mcg/act nasal spray 2 sprays into each nostril daily    glucosamine-chondroitin 500-400 MG tablet Take 1 tablet by mouth daily    losartan (COZAAR) 100 MG tablet Take 1 tablet (100 mg total) by mouth daily    rosuvastatin (CRESTOR) 10 MG tablet Take 1 tablet (10 mg total) by mouth daily     No current facility-administered medications on file prior to visit  He is allergic to diazepam and gabapentin       Review of Systems   Constitutional: Positive for fatigue  HENT: Negative  Eyes: Negative  Respiratory: Negative  Cardiovascular: Negative  Gastrointestinal: Negative  Endocrine: Negative  Genitourinary: Negative  Musculoskeletal:        Left outer thigh N&,T  just to knee, no pain reported   Allergic/Immunologic: Negative  Neurological: Positive for weakness (left leg, upper) and numbness (left outer thigh)  Hematological: Negative            I have personally reviewed all aspects of the review of systems as documented    Objective:      /92 (BP Location: Left arm)   Pulse 57   Temp (!) 97 3 °F (36 3 °C) (Tympanic)   Resp 16   Ht 6' 1" (1 854 m)   Wt 97 5 kg (215 lb)   BMI 28 37 kg/m²           Physical Exam   Constitutional: He is oriented to person, place, and time  He appears well-developed and well-nourished  No distress  HENT:   Head: Normocephalic and atraumatic  Nose: Nose normal    Eyes: Pupils are equal, round, and reactive to light  Conjunctivae and EOM are normal  Right eye exhibits no discharge  Left eye exhibits no discharge  No scleral icterus  Neck: Normal range of motion  No tracheal deviation present  No thyromegaly present  Cardiovascular: Normal rate  Pulmonary/Chest: Effort normal and breath sounds normal  No stridor  No respiratory distress  Abdominal: Soft  Musculoskeletal: Normal range of motion  He exhibits no edema, tenderness or deformity  Neurological: He is alert and oriented to person, place, and time  He has normal strength  No cranial nerve deficit or sensory deficit  Skin: Skin is warm and dry  No rash noted  He is not diaphoretic  No erythema  No pallor  Psychiatric: He has a normal mood and affect   His behavior is normal  Judgment and thought content normal

## 2020-08-20 ENCOUNTER — APPOINTMENT (OUTPATIENT)
Dept: LAB | Age: 75
End: 2020-08-20
Payer: COMMERCIAL

## 2020-08-20 DIAGNOSIS — I10 BENIGN ESSENTIAL HTN: ICD-10-CM

## 2020-08-20 DIAGNOSIS — E78.00 HYPERCHOLESTEROLEMIA: ICD-10-CM

## 2020-08-20 LAB
ALBUMIN SERPL BCP-MCNC: 4 G/DL (ref 3.5–5)
ALP SERPL-CCNC: 58 U/L (ref 46–116)
ALT SERPL W P-5'-P-CCNC: 23 U/L (ref 12–78)
ANION GAP SERPL CALCULATED.3IONS-SCNC: 5 MMOL/L (ref 4–13)
AST SERPL W P-5'-P-CCNC: 16 U/L (ref 5–45)
BASOPHILS # BLD AUTO: 0.08 THOUSANDS/ΜL (ref 0–0.1)
BASOPHILS NFR BLD AUTO: 1 % (ref 0–1)
BILIRUB SERPL-MCNC: 1.63 MG/DL (ref 0.2–1)
BUN SERPL-MCNC: 16 MG/DL (ref 5–25)
CALCIUM SERPL-MCNC: 9.2 MG/DL (ref 8.3–10.1)
CHLORIDE SERPL-SCNC: 108 MMOL/L (ref 100–108)
CO2 SERPL-SCNC: 28 MMOL/L (ref 21–32)
CREAT SERPL-MCNC: 1.35 MG/DL (ref 0.6–1.3)
EOSINOPHIL # BLD AUTO: 0.37 THOUSAND/ΜL (ref 0–0.61)
EOSINOPHIL NFR BLD AUTO: 5 % (ref 0–6)
ERYTHROCYTE [DISTWIDTH] IN BLOOD BY AUTOMATED COUNT: 13.6 % (ref 11.6–15.1)
GFR SERPL CREATININE-BSD FRML MDRD: 51 ML/MIN/1.73SQ M
GLUCOSE P FAST SERPL-MCNC: 91 MG/DL (ref 65–99)
HCT VFR BLD AUTO: 47.1 % (ref 36.5–49.3)
HGB BLD-MCNC: 14.9 G/DL (ref 12–17)
IMM GRANULOCYTES # BLD AUTO: 0.02 THOUSAND/UL (ref 0–0.2)
IMM GRANULOCYTES NFR BLD AUTO: 0 % (ref 0–2)
LYMPHOCYTES # BLD AUTO: 1.85 THOUSANDS/ΜL (ref 0.6–4.47)
LYMPHOCYTES NFR BLD AUTO: 25 % (ref 14–44)
MCH RBC QN AUTO: 29.9 PG (ref 26.8–34.3)
MCHC RBC AUTO-ENTMCNC: 31.6 G/DL (ref 31.4–37.4)
MCV RBC AUTO: 94 FL (ref 82–98)
MONOCYTES # BLD AUTO: 0.52 THOUSAND/ΜL (ref 0.17–1.22)
MONOCYTES NFR BLD AUTO: 7 % (ref 4–12)
NEUTROPHILS # BLD AUTO: 4.57 THOUSANDS/ΜL (ref 1.85–7.62)
NEUTS SEG NFR BLD AUTO: 62 % (ref 43–75)
NRBC BLD AUTO-RTO: 0 /100 WBCS
PLATELET # BLD AUTO: 275 THOUSANDS/UL (ref 149–390)
PMV BLD AUTO: 11.8 FL (ref 8.9–12.7)
POTASSIUM SERPL-SCNC: 4.2 MMOL/L (ref 3.5–5.3)
PROT SERPL-MCNC: 7.2 G/DL (ref 6.4–8.2)
RBC # BLD AUTO: 4.99 MILLION/UL (ref 3.88–5.62)
SODIUM SERPL-SCNC: 141 MMOL/L (ref 136–145)
WBC # BLD AUTO: 7.41 THOUSAND/UL (ref 4.31–10.16)

## 2020-08-20 PROCEDURE — 85025 COMPLETE CBC W/AUTO DIFF WBC: CPT

## 2020-08-20 PROCEDURE — 36415 COLL VENOUS BLD VENIPUNCTURE: CPT

## 2020-08-20 PROCEDURE — 80053 COMPREHEN METABOLIC PANEL: CPT

## 2020-09-09 ENCOUNTER — OFFICE VISIT (OUTPATIENT)
Dept: FAMILY MEDICINE CLINIC | Facility: CLINIC | Age: 75
End: 2020-09-09
Payer: COMMERCIAL

## 2020-09-09 VITALS
WEIGHT: 216 LBS | OXYGEN SATURATION: 96 % | TEMPERATURE: 98.2 F | HEART RATE: 67 BPM | HEIGHT: 73 IN | DIASTOLIC BLOOD PRESSURE: 74 MMHG | BODY MASS INDEX: 28.63 KG/M2 | SYSTOLIC BLOOD PRESSURE: 130 MMHG

## 2020-09-09 DIAGNOSIS — N40.0 BENIGN PROSTATIC HYPERPLASIA WITHOUT LOWER URINARY TRACT SYMPTOMS: ICD-10-CM

## 2020-09-09 DIAGNOSIS — Z00.00 MEDICARE ANNUAL WELLNESS VISIT, SUBSEQUENT: Primary | ICD-10-CM

## 2020-09-09 DIAGNOSIS — M54.16 LUMBAR RADICULOPATHY: ICD-10-CM

## 2020-09-09 DIAGNOSIS — N18.2 STAGE 2 CHRONIC KIDNEY DISEASE: ICD-10-CM

## 2020-09-09 DIAGNOSIS — Z12.5 PROSTATE CANCER SCREENING: ICD-10-CM

## 2020-09-09 DIAGNOSIS — I65.22 STENOSIS OF LEFT CAROTID ARTERY: ICD-10-CM

## 2020-09-09 DIAGNOSIS — Z98.890 S/P LUMBAR LAMINECTOMY: ICD-10-CM

## 2020-09-09 DIAGNOSIS — M76.61 ACHILLES TENDINITIS OF RIGHT LOWER EXTREMITY: ICD-10-CM

## 2020-09-09 DIAGNOSIS — I10 BENIGN ESSENTIAL HYPERTENSION: ICD-10-CM

## 2020-09-09 DIAGNOSIS — E78.00 HYPERCHOLESTEROLEMIA: ICD-10-CM

## 2020-09-09 PROCEDURE — 3078F DIAST BP <80 MM HG: CPT | Performed by: FAMILY MEDICINE

## 2020-09-09 PROCEDURE — 3725F SCREEN DEPRESSION PERFORMED: CPT | Performed by: FAMILY MEDICINE

## 2020-09-09 PROCEDURE — 1170F FXNL STATUS ASSESSED: CPT | Performed by: FAMILY MEDICINE

## 2020-09-09 PROCEDURE — 1125F AMNT PAIN NOTED PAIN PRSNT: CPT | Performed by: FAMILY MEDICINE

## 2020-09-09 PROCEDURE — 99214 OFFICE O/P EST MOD 30 MIN: CPT | Performed by: FAMILY MEDICINE

## 2020-09-09 PROCEDURE — 99397 PER PM REEVAL EST PAT 65+ YR: CPT | Performed by: FAMILY MEDICINE

## 2020-09-09 PROCEDURE — 1160F RVW MEDS BY RX/DR IN RCRD: CPT | Performed by: FAMILY MEDICINE

## 2020-09-09 NOTE — ASSESSMENT & PLAN NOTE
He would like to hold off on any physical therapy at this time  He can certainly ice this after activity  He should stretches daily  Avoid anti-inflammatories at this time due to his mildly elevated creatinine  Contact me if this is getting any worse

## 2020-09-09 NOTE — PROGRESS NOTES
Assessment and Plan:     Problem List Items Addressed This Visit     None      Visit Diagnoses     Medicare annual wellness visit, subsequent    -  Primary        BMI Counseling: Body mass index is 28 5 kg/m²  The BMI is above normal  Nutrition recommendations include encouraging healthy choices of fruits and vegetables  Exercise recommendations include moderate physical activity 150 minutes/week  Patient presents today for Medicare wellness visit  Overall, he continues to do quite well  He has gained some weight throughout COVID but is now back exercising in is already losing it  He exercises routinely without cardiovascular limitations  He is up-to-date on vaccines except for Shingrix which I explained is available at the pharmacy  and will be getting a flu shot this year  He did have a colonoscopy back in June which will be tracking down  PSA will be checked for prostate cancer screening  He screens negative for depression or cognitive decline  Preventive health issues were discussed with patient, and age appropriate screening tests were ordered as noted in patient's After Visit Summary  Personalized health advice and appropriate referrals for health education or preventive services given if needed, as noted in patient's After Visit Summary       History of Present Illness:     Patient presents for Medicare Annual Wellness visit    Patient Care Team:  Gladys Juárez MD as PCP - General     Problem List:     Patient Active Problem List   Diagnosis    Abnormal findings on diagnostic imaging of other specified body structures    Allergic rhinitis    Benign enlargement of prostate    Benign essential hypertension    Osteoarthritis    HNP (herniated nucleus pulposus), lumbar    Lumbar radiculopathy    S/P lumbar laminectomy    Actinic keratoses    Left leg weakness    Syncope    Left shoulder pain    Hypercholesterolemia    Stenosis of left carotid artery      Past Medical and Surgical History:     Past Medical History:   Diagnosis Date    Benign essential hypertension     Broken ribs     Chest pain      Past Surgical History:   Procedure Laterality Date    APPENDECTOMY      LUMBAR LAMINECTOMY Bilateral 2018    TONSILLECTOMY        Family History:     Family History   Problem Relation Age of Onset    Colon cancer Mother     Cancer Father     Thyroid cancer Father     Other Father         MOT[employer] VEHICLE TRAFFIC ACCIDENT    Ovarian cancer Sister       Social History:        Social History     Socioeconomic History    Marital status: /Civil Union     Spouse name: Not on file    Number of children: Not on file    Years of education: Not on file    Highest education level: Not on file   Occupational History    Not on file   Social Needs    Financial resource strain: Not on file    Food insecurity     Worry: Not on file     Inability: Not on file   [employer] needs     Medical: Not on file     Non-medical: Not on file   Tobacco Use    Smoking status: Former Smoker     Last attempt to quit: 1960     Years since quittin 7    Smokeless tobacco: Never Used   Substance and Sexual Activity    Alcohol use: Yes     Frequency: Monthly or less     Drinks per session: 1 or 2     Binge frequency: Never     Comment: SOCIAL    Drug use: Not Currently    Sexual activity: Not on file   Lifestyle    Physical activity     Days per week: Not on file     Minutes per session: Not on file    Stress: Not on file   Relationships    Social connections     Talks on phone: Not on file     Gets together: Not on file     Attends [employer] service: Not on file     Active member of club or organization: Not on file     Attends meetings of clubs or organizations: Not on file     Relationship status: Not on file    Intimate partner violence     Fear of current or ex partner: Not on file     Emotionally abused: Not on file     Physically abused: Not on file     Forced sexual activity: Not on file   Other Topics Concern    Not on file   Social History Narrative    Not on file      Medications and Allergies:     Current Outpatient Medications   Medication Sig Dispense Refill    amLODIPine (NORVASC) 5 mg tablet Take 1 tablet (5 mg total) by mouth daily 90 tablet 3    ascorbic acid (VITAMIN C) 1000 MG tablet Take 1,000 mg by mouth      cetirizine (ZyrTEC) 10 mg tablet Take 1 tablet by mouth      DAILY MULTIPLE VITAMINS tablet Take 1 tablet by mouth daily      fluticasone (FLONASE) 50 mcg/act nasal spray 2 sprays into each nostril daily 3 Bottle 3    glucosamine-chondroitin 500-400 MG tablet Take 1 tablet by mouth daily      losartan (COZAAR) 100 MG tablet Take 1 tablet (100 mg total) by mouth daily 90 tablet 3    rosuvastatin (CRESTOR) 10 MG tablet Take 1 tablet (10 mg total) by mouth daily 90 tablet 3     No current facility-administered medications for this visit  Allergies   Allergen Reactions    Diazepam      Increased anger   Gabapentin Other (See Comments)     Severe mood swings, anger      Immunizations:     Immunization History   Administered Date(s) Administered    Pneumococcal Conjugate 13-Valent 01/22/2018    Pneumococcal Polysaccharide PPV23 05/14/2014    Tdap 05/14/2014    Varicella 01/01/2011      Health Maintenance:         Topic Date Due    Hepatitis C Screening  Completed         Topic Date Due    Influenza Vaccine  07/01/2020      Medicare Health Risk Assessment:     /74 (BP Location: Left arm, Patient Position: Sitting, Cuff Size: Large)   Pulse 67   Temp 98 2 °F (36 8 °C)   Ht 6' 1" (1 854 m)   Wt 98 kg (216 lb)   SpO2 96%   BMI 28 50 kg/m²      Hilda Gramajo is here for his Subsequent Wellness visit  Health Risk Assessment:   Patient rates overall health as good  Patient feels that their physical health rating is same  Eyesight was rated as same  Hearing was rated as same   Patient feels that their emotional and mental health rating is same  Pain experienced in the last 7 days has been some  Patient's pain rating has been 3/10  Patient states that he has experienced weight loss or gain in last 6 months  Depression Screening:   PHQ-2 Score: 0      Fall Risk Screening: In the past year, patient has experienced: no history of falling in past year      Home Safety:  Patient has trouble with stairs inside or outside of their home  Patient has working smoke alarms and has no working carbon monoxide detector  Home safety hazards include: household clutter  Nutrition:   Current diet is Regular  Medications:   Patient is currently taking over-the-counter supplements  OTC medications include: see medication list  Patient is able to manage medications  Activities of Daily Living (ADLs)/Instrumental Activities of Daily Living (IADLs):   Walk and transfer into and out of bed and chair?: Yes  Dress and groom yourself?: Yes    Bathe or shower yourself?: Yes    Feed yourself? Yes  Do your laundry/housekeeping?: Yes  Manage your money, pay your bills and track your expenses?: Yes  Make your own meals?: Yes    Do your own shopping?: Yes    Previous Hospitalizations:   Any hospitalizations or ED visits within the last 12 months?: No      Advance Care Planning:   Living will: Yes    Durable POA for healthcare:  Yes    Advanced directive: Yes    End of Life Decisions reviewed with patient: Yes      Cognitive Screening:   Provider or family/friend/caregiver concerned regarding cognition?: No    PREVENTIVE SCREENINGS      Cardiovascular Screening:    General: Screening Not Indicated and History Lipid Disorder      Diabetes Screening:     General: Screening Current      Colorectal Cancer Screening:     General: Screening Current      Prostate Cancer Screening:    General: Screening Not Indicated      Osteoporosis Screening:    General: Screening Not Indicated      Abdominal Aortic Aneurysm (AAA) Screening:    Risk factors include: age between 73-67 yo and tobacco use        Lung Cancer Screening:     General: Screening Not Indicated      Hepatitis C Screening:    General: Screening Current      Wayne Marx MD

## 2020-09-09 NOTE — PROGRESS NOTES
Assessment/Plan:       Problem List Items Addressed This Visit        Cardiovascular and Mediastinum    Benign essential hypertension    Relevant Orders    Lipid Panel with Direct LDL reflex    Stenosis of left carotid artery    Relevant Orders    CBC and differential    Comprehensive metabolic panel    Lipid Panel with Direct LDL reflex    VAS carotid complete study       Nervous and Auditory    Lumbar radiculopathy       Musculoskeletal and Integument    Achilles tendinitis of right lower extremity     He would like to hold off on any physical therapy at this time  He can certainly ice this after activity  He should stretches daily  Avoid anti-inflammatories at this time due to his mildly elevated creatinine  Contact me if this is getting any worse  Genitourinary    Benign enlargement of prostate    Stage 2 chronic kidney disease    Relevant Orders    CBC and differential    Comprehensive metabolic panel    Vitamin D 25 hydroxy       Other    S/P lumbar laminectomy    Hypercholesterolemia    Relevant Orders    Comprehensive metabolic panel    Lipid Panel with Direct LDL reflex      Other Visit Diagnoses     Medicare annual wellness visit, subsequent    -  Primary    Prostate cancer screening        Relevant Orders    PSA, Total Screen            Subjective:      Patient ID: Fiona Hui is a 76 y o  male  HPI patient presents today for follow-up for chronic health issues  Overall he feels relatively well  He is having some pain in his right distal Achilles area  Denies any acute injury but has been exercising more recently  He has history of carotid stenosis but no history of prior stroke  This was noted during a syncope workup  He has history of hypertension but denies chest pain, shortness of breath, palpitations or lightheadedness  He has a history of chronic low back pain status post lumbar laminectomy previously  He is having some pain in his right lower back    He has some continuous take your symptoms with tingling into the left thigh  EMG did confirm radiculopathy  He is not taking medication for this at this time  He has history of hyperlipidemia and remains on statin therapy without significant myalgias  The following portions of the patient's history were reviewed and updated as appropriate: allergies, current medications, past family history, past medical history, past social history, past surgical history and problem list       Current Outpatient Medications:     amLODIPine (NORVASC) 5 mg tablet, Take 1 tablet (5 mg total) by mouth daily, Disp: 90 tablet, Rfl: 3    ascorbic acid (VITAMIN C) 1000 MG tablet, Take 1,000 mg by mouth, Disp: , Rfl:     cetirizine (ZyrTEC) 10 mg tablet, Take 1 tablet by mouth, Disp: , Rfl:     DAILY MULTIPLE VITAMINS tablet, Take 1 tablet by mouth daily, Disp: , Rfl:     fluticasone (FLONASE) 50 mcg/act nasal spray, 2 sprays into each nostril daily, Disp: 3 Bottle, Rfl: 3    glucosamine-chondroitin 500-400 MG tablet, Take 1 tablet by mouth daily, Disp: , Rfl:     losartan (COZAAR) 100 MG tablet, Take 1 tablet (100 mg total) by mouth daily, Disp: 90 tablet, Rfl: 3    rosuvastatin (CRESTOR) 10 MG tablet, Take 1 tablet (10 mg total) by mouth daily, Disp: 90 tablet, Rfl: 3     Review of Systems   Constitutional: Negative for appetite change, chills, fatigue, fever and unexpected weight change  HENT: Negative for trouble swallowing  Eyes: Negative for visual disturbance  Respiratory: Negative for cough, chest tightness, shortness of breath and wheezing  Cardiovascular: Negative for chest pain, palpitations and leg swelling  Gastrointestinal: Negative for abdominal distention, abdominal pain, blood in stool, constipation and diarrhea  Endocrine: Negative for polyuria  Genitourinary: Negative for difficulty urinating and flank pain  Musculoskeletal: Positive for back pain  Negative for arthralgias and myalgias     Skin: Negative for rash  Neurological: Negative for dizziness and light-headedness  Hematological: Negative for adenopathy  Does not bruise/bleed easily  Psychiatric/Behavioral: Negative for sleep disturbance  Objective:      /74 (BP Location: Left arm, Patient Position: Sitting, Cuff Size: Large)   Pulse 67   Temp 98 2 °F (36 8 °C)   Ht 6' 1" (1 854 m)   Wt 98 kg (216 lb)   SpO2 96%   BMI 28 50 kg/m²          Physical Exam  Constitutional:       General: He is not in acute distress  Appearance: He is well-developed  He is not diaphoretic  HENT:      Head: Normocephalic and atraumatic  Right Ear: External ear normal       Left Ear: External ear normal       Mouth/Throat:      Pharynx: No oropharyngeal exudate  Eyes:      General: No scleral icterus  Right eye: No discharge  Left eye: No discharge  Pupils: Pupils are equal, round, and reactive to light  Neck:      Musculoskeletal: No edema or erythema  Thyroid: No thyromegaly  Vascular: Normal carotid pulses  No carotid bruit  Trachea: No tracheal deviation  Cardiovascular:      Rate and Rhythm: Normal rate and regular rhythm  Heart sounds: Normal heart sounds  No murmur  No gallop  Pulmonary:      Effort: Pulmonary effort is normal  No tachypnea or respiratory distress  Breath sounds: No stridor  No wheezing or rales  Abdominal:      General: Bowel sounds are normal  There is no distension  Palpations: Abdomen is soft  There is no mass  Tenderness: There is no abdominal tenderness  There is no guarding or rebound  Musculoskeletal: Normal range of motion  General: Tenderness (Pain to palpation his distal right Achilles ) present  No deformity  Right lower leg: No edema  Left lower leg: No edema  Comments: He has some pain to palpation on along his right lumbar paraspinal muscles  Lymphadenopathy:      Cervical: No cervical adenopathy     Skin: General: Skin is warm  Coloration: Skin is not pale  Findings: No erythema or rash  Neurological:      Mental Status: He is alert and oriented to person, place, and time  Cranial Nerves: No cranial nerve deficit  Motor: No abnormal muscle tone  Coordination: Coordination normal       Deep Tendon Reflexes: Reflexes normal    Psychiatric:         Mood and Affect: Mood is not anxious or depressed  Speech: Speech normal          Behavior: Behavior normal          Thought Content:  Thought content normal          Judgment: Judgment normal            Carmine Monique MD

## 2020-09-10 ENCOUNTER — TELEPHONE (OUTPATIENT)
Dept: ADMINISTRATIVE | Facility: OTHER | Age: 75
End: 2020-09-10

## 2020-09-10 NOTE — TELEPHONE ENCOUNTER
----- Message from [place], [place] sent at 9/9/2020  1:01 PM EDT -----  09/09/20 1:01 PM    Hello, our patient Fiona Hui has had CRC: Colonoscopy completed/performed  Please assist in updating the patient chart by pulling the document from the Media Tab  The date of service is 6/15/2020        Thank you,  Geetha Stark MA  [facility] PRIMARY [facility]

## 2020-09-10 NOTE — TELEPHONE ENCOUNTER
----- Message from Monika Hopper sent at 9/9/2020  3:52 PM EDT -----  Regarding: Colonoscopy/ [facility]  09/09/20 3:52 PM    Hello, our patient Jonathan Bush has had CRC: Colonoscopy completed/performed  Please assist in updating the patient chart by making an External outreach to     Olga Muhammad MD  Gastroenterology   Phone: [phone];  Fax: [phone]    Thank you,  Anna Cam MA  [facility] PRIMARY CARE

## 2020-09-10 NOTE — TELEPHONE ENCOUNTER
Upon review of the In Basket request we were able to locate, review, and update the patient chart as requested for CRC: Colonoscopy  Any additional questions or concerns should be emailed to the Practice Liaisons via [email or web address] email, please do not reply via In Basket      Thank you  Sesar Costa MA

## 2020-09-10 NOTE — TELEPHONE ENCOUNTER
Upon review of the In Basket request we were able to locate, review, and update the patient chart as requested for CRC: Colonoscopy  Any additional questions or concerns should be emailed to the Practice Liaisons via [email or web address] email, please do not reply via In Basket      Thank you  Kimberlee Izquierdo MA

## 2020-12-01 ENCOUNTER — VBI (OUTPATIENT)
Dept: ADMINISTRATIVE | Facility: OTHER | Age: 75
End: 2020-12-01

## 2021-01-06 DIAGNOSIS — J30.9 ALLERGIC RHINITIS, UNSPECIFIED SEASONALITY, UNSPECIFIED TRIGGER: ICD-10-CM

## 2021-01-06 RX ORDER — FLUTICASONE PROPIONATE 50 MCG
SPRAY, SUSPENSION (ML) NASAL
Qty: 48 G | Refills: 11 | Status: SHIPPED | OUTPATIENT
Start: 2021-01-06 | End: 2022-04-12

## 2021-01-15 ENCOUNTER — HOSPITAL ENCOUNTER (OUTPATIENT)
Dept: NON INVASIVE DIAGNOSTICS | Facility: CLINIC | Age: 76
Discharge: HOME | End: 2021-01-15
Payer: COMMERCIAL

## 2021-01-15 DIAGNOSIS — I65.22 STENOSIS OF LEFT CAROTID ARTERY: ICD-10-CM

## 2021-01-15 PROCEDURE — 93880 EXTRACRANIAL BILAT STUDY: CPT

## 2021-01-15 PROCEDURE — 93880 EXTRACRANIAL BILAT STUDY: CPT | Performed by: SURGERY

## 2021-01-18 DIAGNOSIS — I10 BENIGN ESSENTIAL HTN: ICD-10-CM

## 2021-01-18 DIAGNOSIS — I10 HYPERTENSION, UNSPECIFIED TYPE: ICD-10-CM

## 2021-01-18 DIAGNOSIS — E78.00 HYPERCHOLESTEROLEMIA: ICD-10-CM

## 2021-01-19 RX ORDER — LOSARTAN POTASSIUM 100 MG/1
100 TABLET ORAL DAILY
Qty: 90 TABLET | Refills: 3 | Status: SHIPPED | OUTPATIENT
Start: 2021-01-19 | End: 2021-09-27 | Stop reason: SDUPTHER

## 2021-01-19 RX ORDER — AMLODIPINE BESYLATE 5 MG/1
5 TABLET ORAL DAILY
Qty: 90 TABLET | Refills: 3 | Status: SHIPPED | OUTPATIENT
Start: 2021-01-19 | End: 2021-09-27 | Stop reason: SDUPTHER

## 2021-01-19 RX ORDER — ROSUVASTATIN CALCIUM 10 MG/1
10 TABLET, COATED ORAL DAILY
Qty: 90 TABLET | Refills: 3 | Status: SHIPPED | OUTPATIENT
Start: 2021-01-19 | End: 2021-09-27 | Stop reason: SDUPTHER

## 2021-01-20 DIAGNOSIS — Z23 ENCOUNTER FOR IMMUNIZATION: ICD-10-CM

## 2021-01-25 ENCOUNTER — IMMUNIZATIONS (OUTPATIENT)
Dept: FAMILY MEDICINE CLINIC | Facility: HOSPITAL | Age: 76
End: 2021-01-25

## 2021-01-25 DIAGNOSIS — Z23 ENCOUNTER FOR IMMUNIZATION: Primary | ICD-10-CM

## 2021-01-25 PROCEDURE — 0011A SARS-COV-2 / COVID-19 MRNA VACCINE (MODERNA) 100 MCG: CPT

## 2021-01-25 PROCEDURE — 91301 SARS-COV-2 / COVID-19 MRNA VACCINE (MODERNA) 100 MCG: CPT

## 2021-02-22 ENCOUNTER — IMMUNIZATIONS (OUTPATIENT)
Dept: FAMILY MEDICINE CLINIC | Facility: HOSPITAL | Age: 76
End: 2021-02-22

## 2021-02-22 DIAGNOSIS — Z23 ENCOUNTER FOR IMMUNIZATION: Primary | ICD-10-CM

## 2021-02-22 PROCEDURE — 0012A SARS-COV-2 / COVID-19 MRNA VACCINE (MODERNA) 100 MCG: CPT

## 2021-02-22 PROCEDURE — 91301 SARS-COV-2 / COVID-19 MRNA VACCINE (MODERNA) 100 MCG: CPT

## 2021-03-02 ENCOUNTER — LAB (OUTPATIENT)
Dept: LAB | Age: 76
End: 2021-03-02
Payer: COMMERCIAL

## 2021-03-02 DIAGNOSIS — Z12.5 PROSTATE CANCER SCREENING: ICD-10-CM

## 2021-03-02 DIAGNOSIS — I65.22 STENOSIS OF LEFT CAROTID ARTERY: ICD-10-CM

## 2021-03-02 DIAGNOSIS — E78.00 HYPERCHOLESTEROLEMIA: ICD-10-CM

## 2021-03-02 DIAGNOSIS — I10 BENIGN ESSENTIAL HYPERTENSION: ICD-10-CM

## 2021-03-02 DIAGNOSIS — N18.2 STAGE 2 CHRONIC KIDNEY DISEASE: ICD-10-CM

## 2021-03-02 LAB
25(OH)D3 SERPL-MCNC: 27.6 NG/ML (ref 30–100)
ALBUMIN SERPL BCP-MCNC: 4 G/DL (ref 3.5–5)
ALP SERPL-CCNC: 77 U/L (ref 46–116)
ALT SERPL W P-5'-P-CCNC: 25 U/L (ref 12–78)
ANION GAP SERPL CALCULATED.3IONS-SCNC: 5 MMOL/L (ref 4–13)
AST SERPL W P-5'-P-CCNC: 21 U/L (ref 5–45)
BASOPHILS # BLD AUTO: 0.1 THOUSANDS/ΜL (ref 0–0.1)
BASOPHILS NFR BLD AUTO: 1 % (ref 0–1)
BILIRUB SERPL-MCNC: 1.05 MG/DL (ref 0.2–1)
BUN SERPL-MCNC: 18 MG/DL (ref 5–25)
CALCIUM SERPL-MCNC: 9.8 MG/DL (ref 8.3–10.1)
CHLORIDE SERPL-SCNC: 110 MMOL/L (ref 100–108)
CHOLEST SERPL-MCNC: 143 MG/DL (ref 50–200)
CO2 SERPL-SCNC: 28 MMOL/L (ref 21–32)
CREAT SERPL-MCNC: 1.21 MG/DL (ref 0.6–1.3)
EOSINOPHIL # BLD AUTO: 0.48 THOUSAND/ΜL (ref 0–0.61)
EOSINOPHIL NFR BLD AUTO: 6 % (ref 0–6)
ERYTHROCYTE [DISTWIDTH] IN BLOOD BY AUTOMATED COUNT: 13.2 % (ref 11.6–15.1)
GFR SERPL CREATININE-BSD FRML MDRD: 58 ML/MIN/1.73SQ M
GLUCOSE P FAST SERPL-MCNC: 92 MG/DL (ref 65–99)
HCT VFR BLD AUTO: 45.3 % (ref 36.5–49.3)
HDLC SERPL-MCNC: 56 MG/DL
HGB BLD-MCNC: 14.8 G/DL (ref 12–17)
IMM GRANULOCYTES # BLD AUTO: 0.02 THOUSAND/UL (ref 0–0.2)
IMM GRANULOCYTES NFR BLD AUTO: 0 % (ref 0–2)
LDLC SERPL CALC-MCNC: 71 MG/DL (ref 0–100)
LYMPHOCYTES # BLD AUTO: 1.82 THOUSANDS/ΜL (ref 0.6–4.47)
LYMPHOCYTES NFR BLD AUTO: 23 % (ref 14–44)
MCH RBC QN AUTO: 30.3 PG (ref 26.8–34.3)
MCHC RBC AUTO-ENTMCNC: 32.7 G/DL (ref 31.4–37.4)
MCV RBC AUTO: 93 FL (ref 82–98)
MONOCYTES # BLD AUTO: 0.63 THOUSAND/ΜL (ref 0.17–1.22)
MONOCYTES NFR BLD AUTO: 8 % (ref 4–12)
NEUTROPHILS # BLD AUTO: 5.02 THOUSANDS/ΜL (ref 1.85–7.62)
NEUTS SEG NFR BLD AUTO: 62 % (ref 43–75)
NRBC BLD AUTO-RTO: 0 /100 WBCS
PLATELET # BLD AUTO: 283 THOUSANDS/UL (ref 149–390)
PMV BLD AUTO: 10.4 FL (ref 8.9–12.7)
POTASSIUM SERPL-SCNC: 4.5 MMOL/L (ref 3.5–5.3)
PROT SERPL-MCNC: 7.2 G/DL (ref 6.4–8.2)
PSA SERPL-MCNC: 1.6 NG/ML (ref 0–4)
RBC # BLD AUTO: 4.89 MILLION/UL (ref 3.88–5.62)
SODIUM SERPL-SCNC: 143 MMOL/L (ref 136–145)
TRIGL SERPL-MCNC: 78 MG/DL
WBC # BLD AUTO: 8.07 THOUSAND/UL (ref 4.31–10.16)

## 2021-03-02 PROCEDURE — 85025 COMPLETE CBC W/AUTO DIFF WBC: CPT

## 2021-03-02 PROCEDURE — 80061 LIPID PANEL: CPT

## 2021-03-02 PROCEDURE — 82306 VITAMIN D 25 HYDROXY: CPT

## 2021-03-02 PROCEDURE — 80053 COMPREHEN METABOLIC PANEL: CPT

## 2021-03-02 PROCEDURE — G0103 PSA SCREENING: HCPCS

## 2021-03-02 PROCEDURE — 36415 COLL VENOUS BLD VENIPUNCTURE: CPT

## 2021-03-10 ENCOUNTER — OFFICE VISIT (OUTPATIENT)
Dept: FAMILY MEDICINE CLINIC | Facility: CLINIC | Age: 76
End: 2021-03-10
Payer: COMMERCIAL

## 2021-03-10 VITALS
RESPIRATION RATE: 18 BRPM | SYSTOLIC BLOOD PRESSURE: 122 MMHG | DIASTOLIC BLOOD PRESSURE: 72 MMHG | HEIGHT: 73 IN | OXYGEN SATURATION: 97 % | BODY MASS INDEX: 28.49 KG/M2 | HEART RATE: 72 BPM | WEIGHT: 215 LBS

## 2021-03-10 DIAGNOSIS — I10 BENIGN ESSENTIAL HYPERTENSION: ICD-10-CM

## 2021-03-10 DIAGNOSIS — E55.9 VITAMIN D DEFICIENCY: ICD-10-CM

## 2021-03-10 DIAGNOSIS — N18.2 STAGE 2 CHRONIC KIDNEY DISEASE: ICD-10-CM

## 2021-03-10 DIAGNOSIS — I65.22 STENOSIS OF LEFT CAROTID ARTERY: ICD-10-CM

## 2021-03-10 DIAGNOSIS — I10 BENIGN ESSENTIAL HTN: Primary | ICD-10-CM

## 2021-03-10 DIAGNOSIS — S69.92XS: ICD-10-CM

## 2021-03-10 DIAGNOSIS — E78.00 HYPERCHOLESTEROLEMIA: ICD-10-CM

## 2021-03-10 DIAGNOSIS — L20.84 INTRINSIC ECZEMA: ICD-10-CM

## 2021-03-10 DIAGNOSIS — E66.3 OVERWEIGHT WITH BODY MASS INDEX (BMI) 25.0-29.9: ICD-10-CM

## 2021-03-10 PROCEDURE — 3074F SYST BP LT 130 MM HG: CPT | Performed by: FAMILY MEDICINE

## 2021-03-10 PROCEDURE — 99214 OFFICE O/P EST MOD 30 MIN: CPT | Performed by: FAMILY MEDICINE

## 2021-03-10 PROCEDURE — 1036F TOBACCO NON-USER: CPT | Performed by: FAMILY MEDICINE

## 2021-03-10 PROCEDURE — 3725F SCREEN DEPRESSION PERFORMED: CPT | Performed by: FAMILY MEDICINE

## 2021-03-10 PROCEDURE — 3078F DIAST BP <80 MM HG: CPT | Performed by: FAMILY MEDICINE

## 2021-03-10 PROCEDURE — 1160F RVW MEDS BY RX/DR IN RCRD: CPT | Performed by: FAMILY MEDICINE

## 2021-03-10 RX ORDER — CHOLECALCIFEROL (VITAMIN D3) 125 MCG
2000 CAPSULE ORAL DAILY
COMMUNITY
End: 2021-03-10

## 2021-03-10 RX ORDER — ACETAMINOPHEN 160 MG
2000 TABLET,DISINTEGRATING ORAL DAILY
Refills: 0
Start: 2021-03-10

## 2021-03-10 NOTE — ASSESSMENT & PLAN NOTE
Repeat carotid duplex in early 2022  Continue to aggressively modify risk factors  He does remain on statin therapy and blood pressure is well controlled at this time

## 2021-03-10 NOTE — PROGRESS NOTES
Assessment/Plan:       Problem List Items Addressed This Visit        Cardiovascular and Mediastinum    Benign essential hypertension    Relevant Orders    Comprehensive metabolic panel    CBC and differential    Stenosis of left carotid artery     Repeat carotid duplex in early 2022  Continue to aggressively modify risk factors  He does remain on statin therapy and blood pressure is well controlled at this time  Musculoskeletal and Integument    Intrinsic eczema     Hydrocortisone 2 5% cream as needed  Relevant Medications    hydrocortisone 2 5 % cream       Genitourinary    Stage 2 chronic kidney disease    Relevant Orders    Comprehensive metabolic panel    CBC and differential       Other    Hypercholesterolemia    Relevant Orders    Comprehensive metabolic panel    CBC and differential    Wrist injuries, left, sequela    Relevant Orders    XR wrist 3+ vw left    Vitamin D deficiency     Initiate vitamin-D 2000 International Units daily  Recheck D with next labs  Relevant Medications    Cholecalciferol (Vitamin D3) 50 MCG (2000 UT) capsule    Other Relevant Orders    Vitamin D 25 hydroxy      Other Visit Diagnoses     Benign essential HTN    -  Primary    Relevant Orders    Comprehensive metabolic panel    CBC and differential    Overweight with body mass index (BMI) 25 0-29 9              BMI Counseling: Body mass index is 28 37 kg/m²  The BMI is above normal  Nutrition recommendations include encouraging healthy choices of fruits and vegetables  Exercise recommendations include moderate physical activity 150 minutes/week  Subjective:      Patient ID: Dharmesh Keenan is a 76 y o  male  HPI   Patient presents today for follow-up for chronic health issues  Overall, he feels he is doing pretty well    He has been trying to remain active during [place] but did fall in his driveway after slipping on black ice and injured his left wrist   He was evaluated in the emergency room and there was no fracture noted  He has been using a left wrist brace but is still having some intermittent pain about 6 weeks out  The pain got much worse when he tried to swim yesterday  Fortunately, he did not seem to suffer any significant concussive issues although he experienced head trauma  He had no syncope  He has history of hypertension and denies chest pain, shortness of breath, palpitations or lightheadedness  He has history of elevated creatinine which was normal on recent labs  He does have some diffuse dry skin which seems to be worse since he had tried to swim the other day  He does get eczema on occasion  He has history of carotid stenosis  He has had no stroke-like symptoms  He did have slight progression on his most recent duplex in January  He has chronic lumbar pain status post laminectomy  He seems to be doing pretty well at this point            The following portions of the patient's history were reviewed and updated as appropriate: allergies, current medications, past family history, past medical history, past social history, past surgical history and problem list       Current Outpatient Medications:     amLODIPine (NORVASC) 5 mg tablet, Take 1 tablet (5 mg total) by mouth daily, Disp: 90 tablet, Rfl: 3    ascorbic acid (VITAMIN C) 1000 MG tablet, Take 1,000 mg by mouth, Disp: , Rfl:     cetirizine (ZyrTEC) 10 mg tablet, Take 1 tablet by mouth, Disp: , Rfl:     DAILY MULTIPLE VITAMINS tablet, Take 1 tablet by mouth daily, Disp: , Rfl:     fluticasone (FLONASE) 50 mcg/act nasal spray, SHAKE LIQUID AND USE 2 SPRAYS IN EACH NOSTRIL DAILY, Disp: 48 g, Rfl: 11    glucosamine-chondroitin 500-400 MG tablet, Take 1 tablet by mouth daily, Disp: , Rfl:     losartan (COZAAR) 100 MG tablet, Take 1 tablet (100 mg total) by mouth daily, Disp: 90 tablet, Rfl: 3    rosuvastatin (CRESTOR) 10 MG tablet, Take 1 tablet (10 mg total) by mouth daily, Disp: 90 tablet, Rfl: 3    Cholecalciferol (Vitamin D3) 50 MCG (2000 UT) capsule, Take 1 capsule (2,000 Units total) by mouth daily, Disp: , Rfl: 0    hydrocortisone 2 5 % cream, Apply topically 2 (two) times a day as needed for rash, Disp: 30 g, Rfl: 5     Review of Systems   Constitutional: Negative for appetite change, chills, fatigue, fever and unexpected weight change  HENT: Negative for trouble swallowing  Eyes: Negative for visual disturbance  Respiratory: Negative for cough, chest tightness, shortness of breath and wheezing  Cardiovascular: Negative for chest pain, palpitations and leg swelling  Gastrointestinal: Negative for abdominal distention, abdominal pain, blood in stool, constipation and diarrhea  Endocrine: Negative for polyuria  Genitourinary: Negative for difficulty urinating and flank pain  Musculoskeletal: Positive for arthralgias (Left wrist remains painful), back pain and joint swelling ( left wrist swelling has improved  )  Negative for gait problem and myalgias  Skin: Negative for rash  Neurological: Negative for dizziness, light-headedness and numbness  Hematological: Negative for adenopathy  Does not bruise/bleed easily  Psychiatric/Behavioral: Negative for dysphoric mood and sleep disturbance  The patient is not nervous/anxious  Objective:      /72   Pulse 72   Resp 18   Ht 6' 1" (1 854 m)   Wt 97 5 kg (215 lb)   SpO2 97%   BMI 28 37 kg/m²          Physical Exam  Constitutional:       General: He is not in acute distress  Appearance: He is well-developed  He is not diaphoretic  HENT:      Head: Normocephalic  Eyes:      General:         Right eye: No discharge  Left eye: No discharge  Pupils: Pupils are equal, round, and reactive to light  Neck:      Thyroid: No thyromegaly  Trachea: No tracheal deviation  Cardiovascular:      Rate and Rhythm: Normal rate and regular rhythm  Heart sounds: Normal heart sounds  No murmur     Pulmonary:      Effort: Pulmonary effort is normal  No respiratory distress  Breath sounds: No wheezing or rales  Abdominal:      General: There is no distension  Palpations: Abdomen is soft  Tenderness: There is no abdominal tenderness  Musculoskeletal: Normal range of motion  General: Swelling (Left wrist remains swollen  It is also tender to palpation in the mid joint  Range of motion is still not 100% ) and tenderness present  No deformity or signs of injury  Right lower leg: No edema  Left lower leg: No edema  Lymphadenopathy:      Cervical: No cervical adenopathy  Skin:     General: Skin is warm  Findings: No erythema  Neurological:      Mental Status: He is alert and oriented to person, place, and time  Cranial Nerves: No cranial nerve deficit  Psychiatric:         Thought Content:  Thought content normal          Judgment: Judgment normal            Trisha Membreno MD

## 2021-07-13 ENCOUNTER — APPOINTMENT (OUTPATIENT)
Dept: LAB | Age: 76
End: 2021-07-13
Payer: COMMERCIAL

## 2021-07-13 DIAGNOSIS — I10 BENIGN ESSENTIAL HYPERTENSION: ICD-10-CM

## 2021-07-13 DIAGNOSIS — E78.00 HYPERCHOLESTEROLEMIA: ICD-10-CM

## 2021-07-13 DIAGNOSIS — N18.2 STAGE 2 CHRONIC KIDNEY DISEASE: ICD-10-CM

## 2021-07-13 DIAGNOSIS — E55.9 VITAMIN D DEFICIENCY: ICD-10-CM

## 2021-07-13 DIAGNOSIS — I10 BENIGN ESSENTIAL HTN: ICD-10-CM

## 2021-07-13 LAB
25(OH)D3 SERPL-MCNC: 44.4 NG/ML (ref 30–100)
ALBUMIN SERPL BCP-MCNC: 3.6 G/DL (ref 3.5–5)
ALP SERPL-CCNC: 64 U/L (ref 46–116)
ALT SERPL W P-5'-P-CCNC: 21 U/L (ref 12–78)
ANION GAP SERPL CALCULATED.3IONS-SCNC: 7 MMOL/L (ref 4–13)
AST SERPL W P-5'-P-CCNC: 15 U/L (ref 5–45)
BASOPHILS # BLD AUTO: 0.09 THOUSANDS/ΜL (ref 0–0.1)
BASOPHILS NFR BLD AUTO: 1 % (ref 0–1)
BILIRUB SERPL-MCNC: 1.39 MG/DL (ref 0.2–1)
BUN SERPL-MCNC: 18 MG/DL (ref 5–25)
CALCIUM SERPL-MCNC: 9.5 MG/DL (ref 8.3–10.1)
CHLORIDE SERPL-SCNC: 106 MMOL/L (ref 100–108)
CO2 SERPL-SCNC: 26 MMOL/L (ref 21–32)
CREAT SERPL-MCNC: 1.26 MG/DL (ref 0.6–1.3)
EOSINOPHIL # BLD AUTO: 0.31 THOUSAND/ΜL (ref 0–0.61)
EOSINOPHIL NFR BLD AUTO: 4 % (ref 0–6)
ERYTHROCYTE [DISTWIDTH] IN BLOOD BY AUTOMATED COUNT: 13.6 % (ref 11.6–15.1)
GFR SERPL CREATININE-BSD FRML MDRD: 55 ML/MIN/1.73SQ M
GLUCOSE P FAST SERPL-MCNC: 98 MG/DL (ref 65–99)
HCT VFR BLD AUTO: 46.2 % (ref 36.5–49.3)
HGB BLD-MCNC: 15.1 G/DL (ref 12–17)
IMM GRANULOCYTES # BLD AUTO: 0.02 THOUSAND/UL (ref 0–0.2)
IMM GRANULOCYTES NFR BLD AUTO: 0 % (ref 0–2)
LYMPHOCYTES # BLD AUTO: 1.91 THOUSANDS/ΜL (ref 0.6–4.47)
LYMPHOCYTES NFR BLD AUTO: 27 % (ref 14–44)
MCH RBC QN AUTO: 30.8 PG (ref 26.8–34.3)
MCHC RBC AUTO-ENTMCNC: 32.7 G/DL (ref 31.4–37.4)
MCV RBC AUTO: 94 FL (ref 82–98)
MONOCYTES # BLD AUTO: 0.53 THOUSAND/ΜL (ref 0.17–1.22)
MONOCYTES NFR BLD AUTO: 8 % (ref 4–12)
NEUTROPHILS # BLD AUTO: 4.17 THOUSANDS/ΜL (ref 1.85–7.62)
NEUTS SEG NFR BLD AUTO: 60 % (ref 43–75)
NRBC BLD AUTO-RTO: 0 /100 WBCS
PLATELET # BLD AUTO: 293 THOUSANDS/UL (ref 149–390)
PMV BLD AUTO: 10.5 FL (ref 8.9–12.7)
POTASSIUM SERPL-SCNC: 4.7 MMOL/L (ref 3.5–5.3)
PROT SERPL-MCNC: 7.2 G/DL (ref 6.4–8.2)
RBC # BLD AUTO: 4.91 MILLION/UL (ref 3.88–5.62)
SODIUM SERPL-SCNC: 139 MMOL/L (ref 136–145)
WBC # BLD AUTO: 7.03 THOUSAND/UL (ref 4.31–10.16)

## 2021-07-13 PROCEDURE — 80053 COMPREHEN METABOLIC PANEL: CPT

## 2021-07-13 PROCEDURE — 85025 COMPLETE CBC W/AUTO DIFF WBC: CPT

## 2021-07-13 PROCEDURE — 82306 VITAMIN D 25 HYDROXY: CPT

## 2021-07-13 PROCEDURE — 36415 COLL VENOUS BLD VENIPUNCTURE: CPT

## 2021-09-21 ENCOUNTER — RA CDI HCC (OUTPATIENT)
Dept: OTHER | Facility: HOSPITAL | Age: 76
End: 2021-09-21

## 2021-09-21 NOTE — PROGRESS NOTES
[facility] 75  coding opportunities       Chart reviewed, no opportunity found: CHART REVIEWED, NO OPPORTUNITY FOUND                        Patients insurance company: [employer] (Medicare Advantage and Commercial)

## 2021-09-27 ENCOUNTER — OFFICE VISIT (OUTPATIENT)
Dept: FAMILY MEDICINE CLINIC | Facility: CLINIC | Age: 76
End: 2021-09-27
Payer: COMMERCIAL

## 2021-09-27 VITALS
WEIGHT: 206.2 LBS | SYSTOLIC BLOOD PRESSURE: 126 MMHG | BODY MASS INDEX: 27.33 KG/M2 | TEMPERATURE: 96.1 F | DIASTOLIC BLOOD PRESSURE: 72 MMHG | RESPIRATION RATE: 18 BRPM | HEIGHT: 73 IN | HEART RATE: 53 BPM

## 2021-09-27 DIAGNOSIS — I10 HYPERTENSION, UNSPECIFIED TYPE: ICD-10-CM

## 2021-09-27 DIAGNOSIS — E78.00 HYPERCHOLESTEROLEMIA: ICD-10-CM

## 2021-09-27 DIAGNOSIS — Z12.5 PROSTATE CANCER SCREENING: ICD-10-CM

## 2021-09-27 DIAGNOSIS — Z00.00 MEDICARE ANNUAL WELLNESS VISIT, SUBSEQUENT: Primary | ICD-10-CM

## 2021-09-27 DIAGNOSIS — I65.22 STENOSIS OF LEFT CAROTID ARTERY: ICD-10-CM

## 2021-09-27 DIAGNOSIS — S69.92XS: ICD-10-CM

## 2021-09-27 DIAGNOSIS — I10 BENIGN ESSENTIAL HTN: ICD-10-CM

## 2021-09-27 DIAGNOSIS — I10 BENIGN ESSENTIAL HYPERTENSION: ICD-10-CM

## 2021-09-27 PROCEDURE — 1123F ACP DISCUSS/DSCN MKR DOCD: CPT | Performed by: FAMILY MEDICINE

## 2021-09-27 PROCEDURE — G0439 PPPS, SUBSEQ VISIT: HCPCS | Performed by: FAMILY MEDICINE

## 2021-09-27 PROCEDURE — 99214 OFFICE O/P EST MOD 30 MIN: CPT | Performed by: FAMILY MEDICINE

## 2021-09-27 RX ORDER — AMLODIPINE BESYLATE 5 MG/1
5 TABLET ORAL DAILY
Qty: 90 TABLET | Refills: 3 | Status: SHIPPED | OUTPATIENT
Start: 2021-09-27

## 2021-09-27 RX ORDER — LOSARTAN POTASSIUM 100 MG/1
100 TABLET ORAL DAILY
Qty: 90 TABLET | Refills: 3 | Status: SHIPPED | OUTPATIENT
Start: 2021-09-27

## 2021-09-27 RX ORDER — ROSUVASTATIN CALCIUM 10 MG/1
10 TABLET, COATED ORAL DAILY
Qty: 90 TABLET | Refills: 3 | Status: SHIPPED | OUTPATIENT
Start: 2021-09-27

## 2021-09-27 NOTE — PATIENT INSTRUCTIONS
Fall Prevention   WHAT YOU NEED TO KNOW:   Fall prevention includes ways to make your home and other areas safer  It also includes ways you can move more carefully to prevent a fall  Health conditions that cause changes in your blood pressure, vision, or muscle strength and coordination may increase your risk for falls  Medicines may also increase your risk for falls if they make you dizzy, weak, or sleepy  DISCHARGE INSTRUCTIONS:   Call 911 or have someone else call if:   · You have fallen and are unconscious  · You have fallen and cannot move part of your body  Contact your healthcare provider if:   · You have fallen and have pain or a headache  · You have questions or concerns about your condition or care  Fall prevention tips:   · Stand or sit up slowly  This may help you keep your balance and prevent falls  · Use assistive devices as directed  Your healthcare provider may suggest that you use a cane or walker to help you keep your balance  You may need to have grab bars put in your bathroom near the toilet or in the shower  · Wear shoes that fit well and have soles that   Wear shoes both inside and outside  Use slippers with good   Do not wear shoes with high heels  · Wear a personal alarm  This is a device that allows you to call 911 if you fall and need help  Ask your healthcare provider for more information  · Stay active  Exercise can help strengthen your muscles and improve your balance  Your healthcare provider may recommend water aerobics or walking  He or she may also recommend physical therapy to improve your coordination  Never start an exercise program without talking to your healthcare provider first          · Manage your medical conditions  Keep all appointments with your healthcare providers  Visit your eye doctor as directed  Home safety tips:       · Add items to prevent falls in the bathroom    Put nonslip strips on your bath or shower floor to prevent you from slipping  Use a bath mat if you do not have carpet in the bathroom  This will prevent you from falling when you step out of the bath or shower  Use a shower seat so you do not need to stand while you shower  Sit on the toilet or a chair in your bathroom to dry yourself and put on clothing  This will prevent you from losing your balance from drying or dressing yourself while you are standing  · Keep paths clear  Remove books, shoes, and other objects from walkways and stairs  Place cords for telephones and lamps out of the way so that you do not need to walk over them  Tape them down if you cannot move them  Remove small rugs  If you cannot remove a rug, secure it with double-sided tape  This will prevent you from tripping  · Install bright lights in your home  Use night lights to help light paths to the bathroom or kitchen  Always turn on the light before you start walking  · Keep items you use often on shelves within reach  Do not use a step stool to help you reach an item  · Paint or place reflective tape on the edges of your stairs  This will help you see the stairs better  Follow up with your healthcare provider as directed:  Write down your questions so you remember to ask them during your visits  © Copyright [employer] 2021 Information is for End User's use only and may not be sold, redistributed or otherwise used for commercial purposes  All illustrations and images included in CareNotes® are the copyrighted property of A D A M , Inc  or [employer]  The above information is an  only  It is not intended as medical advice for individual conditions or treatments  Talk to your doctor, nurse or pharmacist before following any medical regimen to see if it is safe and effective for you

## 2021-09-27 NOTE — PROGRESS NOTES
Assessment/Plan:       Problem List Items Addressed This Visit        Cardiovascular and Mediastinum    Benign essential hypertension    Relevant Medications    losartan (COZAAR) 100 MG tablet    amLODIPine (NORVASC) 5 mg tablet    Stenosis of left carotid artery    Relevant Orders    Comprehensive metabolic panel    Lipid Panel with Direct LDL reflex       Other    Hypercholesterolemia    Relevant Medications    rosuvastatin (CRESTOR) 10 MG tablet    Wrist injuries, left, sequela     He is having relatively significant persistent left wrist pain along the thumb mostly  His  strength is slightly decreased  Check MRI as regional x-rays were negative  Relevant Orders    MRI wrist left wo contrast      Other Visit Diagnoses     Medicare annual wellness visit, subsequent    -  Primary    Benign essential HTN        Relevant Medications    losartan (COZAAR) 100 MG tablet    amLODIPine (NORVASC) 5 mg tablet    Other Relevant Orders    Comprehensive metabolic panel    CBC and differential    Hypertension, unspecified type        Relevant Medications    losartan (COZAAR) 100 MG tablet    amLODIPine (NORVASC) 5 mg tablet    Other Relevant Orders    Comprehensive metabolic panel    CBC and differential    Prostate cancer screening        Relevant Orders    PSA, Total Screen            Depression Screening and Follow-up Plan:   Patient was screened for depression during today's encounter  They screened negative with a PHQ-2 score of 0  Subjective:      Patient ID: Mily Marrero is a 68 y o  male  HPI patient presents today for follow-up for chronic health issues  Overall, he continues do well  He is having some persistent issues with back pain and numbness into his left leg status post surgery in 2018  He uses a cane on occasion  He avoids pain medications  He also is having some pain in his left wrist along the proximal thumb especially with     He did have a fall  on February 4th and x-rays were negative  He has history of hypertension is tolerating his medications  He denies any chest pain, shortness of breath or lightheadedness  The following portions of the patient's history were reviewed and updated as appropriate: allergies, current medications, past family history, past medical history, past social history, past surgical history and problem list       Current Outpatient Medications:     ascorbic acid (VITAMIN C) 1000 MG tablet, Take 1,000 mg by mouth, Disp: , Rfl:     cetirizine (ZyrTEC) 10 mg tablet, Take 1 tablet by mouth, Disp: , Rfl:     Cholecalciferol (Vitamin D3) 50 MCG (2000 UT) capsule, Take 1 capsule (2,000 Units total) by mouth daily, Disp: , Rfl: 0    DAILY MULTIPLE VITAMINS tablet, Take 1 tablet by mouth daily, Disp: , Rfl:     fluticasone (FLONASE) 50 mcg/act nasal spray, SHAKE LIQUID AND USE 2 SPRAYS IN EACH NOSTRIL DAILY, Disp: 48 g, Rfl: 11    glucosamine-chondroitin 500-400 MG tablet, Take 1 tablet by mouth daily, Disp: , Rfl:     hydrocortisone 2 5 % cream, Apply topically 2 (two) times a day as needed for rash, Disp: 30 g, Rfl: 5    amLODIPine (NORVASC) 5 mg tablet, Take 1 tablet (5 mg total) by mouth daily, Disp: 90 tablet, Rfl: 3    losartan (COZAAR) 100 MG tablet, Take 1 tablet (100 mg total) by mouth daily, Disp: 90 tablet, Rfl: 3    rosuvastatin (CRESTOR) 10 MG tablet, Take 1 tablet (10 mg total) by mouth daily, Disp: 90 tablet, Rfl: 3     Review of Systems      Objective:      /72 (BP Location: Left arm, Patient Position: Sitting, Cuff Size: Adult)   Pulse (!) 53   Temp (!) 96 1 °F (35 6 °C) (Tympanic)   Resp 18   Ht 6' 1" (1 854 m)   Wt 93 5 kg (206 lb 3 2 oz)   BMI 27 20 kg/m²          Physical Exam  Vitals reviewed  Constitutional:       General: He is not in acute distress  Appearance: He is well-developed  He is not diaphoretic  HENT:      Head: Normocephalic  Eyes:      General:         Right eye: No discharge           Left eye: No discharge  Pupils: Pupils are equal, round, and reactive to light  Neck:      Thyroid: No thyromegaly  Trachea: No tracheal deviation  Cardiovascular:      Rate and Rhythm: Normal rate and regular rhythm  Heart sounds: Normal heart sounds  No murmur heard  Pulmonary:      Effort: Pulmonary effort is normal  No respiratory distress  Breath sounds: No wheezing or rales  Abdominal:      General: There is no distension  Palpations: Abdomen is soft  Tenderness: There is no abdominal tenderness  Musculoskeletal:         General: Normal range of motion  Right lower leg: No edema  Left lower leg: No edema  Lymphadenopathy:      Cervical: No cervical adenopathy  Skin:     General: Skin is warm  Findings: No erythema  Neurological:      General: No focal deficit present  Mental Status: He is alert and oriented to person, place, and time  Cranial Nerves: No cranial nerve deficit  Gait: Gait normal    Psychiatric:         Thought Content:  Thought content normal          Judgment: Judgment normal            Ammy Machado MD

## 2021-09-27 NOTE — PROGRESS NOTES
Assessment and Plan:     Problem List Items Addressed This Visit        Other    Hypercholesterolemia    Wrist injuries, left, sequela      Other Visit Diagnoses     Medicare annual wellness visit, subsequent    -  Primary    Benign essential HTN        Hypertension, unspecified type          Patient presents today for Medicare wellness visit  Overall, he remains quite stable  He is up-to-date with vaccinations  He does have some chronic pain in his left wrist since the fall as well as his low back status post lumbar laminectomy  This remains stable and tolerable  He remains very active  He screens negative for depression or cognitive decline  Does have a living will  Depression Screening and Follow-up Plan:   Patient was screened for depression during today's encounter  They screened negative with a PHQ-2 score of 0  Falls Plan of Care: balance, strength, and gait training instructions were provided  Preventive health issues were discussed with patient, and age appropriate screening tests were ordered as noted in patient's After Visit Summary  Personalized health advice and appropriate referrals for health education or preventive services given if needed, as noted in patient's After Visit Summary       History of Present Illness:     Patient presents for Medicare Annual Wellness visit    Patient Care Team:  Noelle Stein MD as PCP - Nhung Gutierrez MD as PCP - PCP-[facility] Attributed-Corinne Sanon MD (Gastroenterology)     Problem List:     Patient Active Problem List   Diagnosis    Abnormal findings on diagnostic imaging of other specified body structures    Allergic rhinitis    Benign enlargement of prostate    Benign essential hypertension    Osteoarthritis    HNP (herniated nucleus pulposus), lumbar    Lumbar radiculopathy    S/P lumbar laminectomy    Actinic keratoses    Left leg weakness    Syncope    Left shoulder pain    Hypercholesterolemia    Stenosis of left carotid artery    Stage 2 chronic kidney disease    Achilles tendinitis of right lower extremity    Intrinsic eczema    Wrist injuries, left, sequela    Vitamin D deficiency      Past Medical and Surgical History:     Past Medical History:   Diagnosis Date    Benign essential hypertension     Broken ribs     Chest pain      Past Surgical History:   Procedure Laterality Date    APPENDECTOMY      LUMBAR LAMINECTOMY Bilateral 2018    TONSILLECTOMY        Family History:     Family History   Problem Relation Age of Onset    Colon cancer Mother     Cancer Father     Thyroid cancer Father     Other Father         MOTOR VEHICLE TRAFFIC ACCIDENT    Ovarian cancer Sister       Social History:     Social History     Socioeconomic History    Marital status: /Civil Union     Spouse name: None    Number of children: None    Years of education: None    Highest education level: None   Occupational History    None   Tobacco Use    Smoking status: Former Smoker     Quit date:      Years since quittin 7    Smokeless tobacco: Never Used   Substance and Sexual Activity    Alcohol use: Yes     Comment: SOCIAL    Drug use: Not Currently    Sexual activity: None   Other Topics Concern    None   Social History Narrative    None     Social Determinants of Health     Financial Resource Strain:     Difficulty of Paying Living Expenses:    Food Insecurity:     Worried About Running Out of Food in the Last Year:     Ran Out of Food in the Last Year:    Transportation Needs:     Lack of Transportation (Medical):      Lack of Transportation (Non-Medical):    Physical Activity:     Days of Exercise per Week:     Minutes of Exercise per Session:    Stress:     Feeling of Stress :    Social Connections:     Frequency of Communication with Friends and Family:     Frequency of Social Gatherings with Friends and Family:     Attends Christian Services:  Active Member of Clubs or Organizations:     Attends Club or Organization Meetings:     Marital Status:    Intimate Partner Violence:     Fear of Current or Ex-Partner:     Emotionally Abused:     Physically Abused:     Sexually Abused:       Medications and Allergies:     Current Outpatient Medications   Medication Sig Dispense Refill    amLODIPine (NORVASC) 5 mg tablet Take 1 tablet (5 mg total) by mouth daily 90 tablet 3    ascorbic acid (VITAMIN C) 1000 MG tablet Take 1,000 mg by mouth      cetirizine (ZyrTEC) 10 mg tablet Take 1 tablet by mouth      Cholecalciferol (Vitamin D3) 50 MCG (2000 UT) capsule Take 1 capsule (2,000 Units total) by mouth daily  0    DAILY MULTIPLE VITAMINS tablet Take 1 tablet by mouth daily      fluticasone (FLONASE) 50 mcg/act nasal spray SHAKE LIQUID AND USE 2 SPRAYS IN EACH NOSTRIL DAILY 48 g 11    glucosamine-chondroitin 500-400 MG tablet Take 1 tablet by mouth daily      hydrocortisone 2 5 % cream Apply topically 2 (two) times a day as needed for rash 30 g 5    losartan (COZAAR) 100 MG tablet Take 1 tablet (100 mg total) by mouth daily 90 tablet 3    rosuvastatin (CRESTOR) 10 MG tablet Take 1 tablet (10 mg total) by mouth daily 90 tablet 3     No current facility-administered medications for this visit  Allergies   Allergen Reactions    Diazepam      Increased anger   Gabapentin Other (See Comments)     Severe mood swings, anger      Immunizations:     Immunization History   Administered Date(s) Administered    Pneumococcal Conjugate 13-Valent 01/22/2018    Pneumococcal Polysaccharide PPV23 05/14/2014    SARS-CoV-2 / COVID-19 mRNA IM (Bryan Genao) 01/25/2021, 02/22/2021    Tdap 05/14/2014    Varicella 01/01/2011      Health Maintenance:         Topic Date Due    Colorectal Cancer Screening  06/15/2025    Hepatitis C Screening  Completed     There are no preventive care reminders to display for this patient     Medicare Health Risk Assessment:     BP 126/72 (BP Location: Left arm, Patient Position: Sitting, Cuff Size: Adult)   Pulse (!) 53   Temp (!) 96 1 °F (35 6 °C) (Tympanic)   Resp 18   Ht 6' 1" (1 854 m)   Wt 93 5 kg (206 lb 3 2 oz)   BMI 27 20 kg/m²      Parish Thrasher is here for his Subsequent Wellness visit  Health Risk Assessment:   Patient rates overall health as good  Patient feels that their physical health rating is same  Patient is satisfied with their life  Eyesight was rated as same  Hearing was rated as same  Patient feels that their emotional and mental health rating is same  Patients states they are sometimes angry  Patient states they are sometimes unusually tired/fatigued  Pain experienced in the last 7 days has been some  Patient's pain rating has been 4/10  Patient states that he has experienced no weight loss or gain in last 6 months  Depression Screening:   PHQ-2 Score: 0      Fall Risk Screening: In the past year, patient has experienced: history of falling in past year    Number of falls: 1  Injured during fall?: Yes    Feels unsteady when standing or walking?: Yes    Worried about falling?: Yes      Home Safety:  Patient has trouble with stairs inside or outside of their home  Patient has working smoke alarms and has no working carbon monoxide detector  Home safety hazards include: loose rugs on the floor  Sometimes he has trouble with stairs   loose rugs sometimes     Nutrition:   Current diet is Unhealthy and Regular  Medications:   Patient is currently taking over-the-counter supplements  OTC medications include: see medication list  Patient is able to manage medications  Activities of Daily Living (ADLs)/Instrumental Activities of Daily Living (IADLs):   Walk and transfer into and out of bed and chair?: Yes  Dress and groom yourself?: Yes    Bathe or shower yourself?: Yes    Feed yourself?  Yes  Do your laundry/housekeeping?: Yes  Manage your money, pay your bills and track your expenses?: Yes  Make your own meals?: Yes    Do your own shopping?: Yes    Previous Hospitalizations:   Any hospitalizations or ED visits within the last 12 months?: Yes    How many hospitalizations have you had in the last year?: 1-2    Hospitalization Comments: Due to the fall listed above     Advance Care Planning:   Living will: Yes    Durable POA for healthcare: Yes    Advanced directive: Yes    Advanced directive counseling given: Yes    Five wishes given: Yes    End of Life Decisions reviewed with patient: Yes      Cognitive Screening:   Provider or family/friend/caregiver concerned regarding cognition?: No    PREVENTIVE SCREENINGS      Cardiovascular Screening:    General: Screening Not Indicated and History Lipid Disorder      Diabetes Screening:     General: Screening Current      Colorectal Cancer Screening:     General: Screening Current      Prostate Cancer Screening:    General: Screening Not Indicated      Osteoporosis Screening:    General: Screening Not Indicated      Abdominal Aortic Aneurysm (AAA) Screening:    Risk factors include: tobacco use        Lung Cancer Screening:     General: Screening Not Indicated      Hepatitis C Screening:    General: Screening Current    Screening, Brief Intervention, and Referral to Treatment (SBIRT)    Screening  Typical number of drinks in a day: 0  Typical number of drinks in a week: 0  Interpretation: Low risk drinking behavior      AUDIT-C Screenin) How often did you have a drink containing alcohol in the past year? never  2) How many drinks did you have on a typical day when you were drinking in the past year? 0  3) How often did you have 6 or more drinks on one occasion in the past year? never    AUDIT-C Score: 0  Interpretation: Score 0-3 (male): Negative screen for alcohol misuse    Single Item Drug Screening:  How often have you used an illegal drug (including marijuana) or a prescription medication for non-medical reasons in the past year? never    Single Item Drug Screen Score: 0  Interpretation: Negative screen for possible drug use disorder      Chetan Villarreal MD

## 2021-09-27 NOTE — ASSESSMENT & PLAN NOTE
He is having relatively significant persistent left wrist pain along the thumb mostly  His  strength is slightly decreased  Check MRI as regional x-rays were negative

## 2021-11-02 ENCOUNTER — HOSPITAL ENCOUNTER (OUTPATIENT)
Dept: RADIOLOGY | Age: 76
Discharge: HOME | End: 2021-11-02
Payer: COMMERCIAL

## 2021-11-02 DIAGNOSIS — S69.92XS: ICD-10-CM

## 2021-11-02 PROCEDURE — G1004 CDSM NDSC: HCPCS

## 2021-11-02 PROCEDURE — 73221 MRI JOINT UPR EXTREM W/O DYE: CPT

## 2021-11-03 PROBLEM — S63.8X9A TEAR OF SCAPHOLUNATE LIGAMENT: Status: ACTIVE | Noted: 2021-11-03

## 2021-11-05 ENCOUNTER — TELEPHONE (OUTPATIENT)
Dept: OBGYN CLINIC | Facility: HOSPITAL | Age: 76
End: 2021-11-05

## 2021-12-01 ENCOUNTER — OFFICE VISIT (OUTPATIENT)
Dept: OBGYN CLINIC | Facility: CLINIC | Age: 76
End: 2021-12-01
Payer: COMMERCIAL

## 2021-12-01 VITALS
WEIGHT: 211 LBS | BODY MASS INDEX: 27.96 KG/M2 | RESPIRATION RATE: 17 BRPM | DIASTOLIC BLOOD PRESSURE: 82 MMHG | HEART RATE: 77 BPM | HEIGHT: 73 IN | SYSTOLIC BLOOD PRESSURE: 138 MMHG

## 2021-12-01 DIAGNOSIS — S63.8X9A TEAR OF SCAPHOLUNATE LIGAMENT: ICD-10-CM

## 2021-12-01 DIAGNOSIS — M19.132 SLAC (SCAPHOLUNATE ADVANCED COLLAPSE) OF WRIST, LEFT: Primary | ICD-10-CM

## 2021-12-01 DIAGNOSIS — M18.12 ARTHRITIS OF CARPOMETACARPAL (CMC) JOINT OF LEFT THUMB: ICD-10-CM

## 2021-12-01 PROCEDURE — 99244 OFF/OP CNSLTJ NEW/EST MOD 40: CPT | Performed by: SURGERY

## 2021-12-01 PROCEDURE — 1036F TOBACCO NON-USER: CPT | Performed by: SURGERY

## 2021-12-01 PROCEDURE — 20605 DRAIN/INJ JOINT/BURSA W/O US: CPT | Performed by: SURGERY

## 2021-12-01 PROCEDURE — 1160F RVW MEDS BY RX/DR IN RCRD: CPT | Performed by: SURGERY

## 2021-12-01 PROCEDURE — 20600 DRAIN/INJ JOINT/BURSA W/O US: CPT | Performed by: SURGERY

## 2021-12-01 RX ORDER — LIDOCAINE HYDROCHLORIDE 10 MG/ML
0.5 INJECTION, SOLUTION INFILTRATION; PERINEURAL
Status: COMPLETED | OUTPATIENT
Start: 2021-12-01 | End: 2021-12-01

## 2021-12-01 RX ORDER — BUPIVACAINE HYDROCHLORIDE 2.5 MG/ML
0.5 INJECTION, SOLUTION INFILTRATION; PERINEURAL
Status: COMPLETED | OUTPATIENT
Start: 2021-12-01 | End: 2021-12-01

## 2021-12-01 RX ORDER — TRIAMCINOLONE ACETONIDE 40 MG/ML
40 INJECTION, SUSPENSION INTRA-ARTICULAR; INTRAMUSCULAR
Status: COMPLETED | OUTPATIENT
Start: 2021-12-01 | End: 2021-12-01

## 2021-12-01 RX ORDER — TRIAMCINOLONE ACETONIDE 40 MG/ML
20 INJECTION, SUSPENSION INTRA-ARTICULAR; INTRAMUSCULAR
Status: COMPLETED | OUTPATIENT
Start: 2021-12-01 | End: 2021-12-01

## 2021-12-01 RX ORDER — LIDOCAINE HYDROCHLORIDE 10 MG/ML
1 INJECTION, SOLUTION INFILTRATION; PERINEURAL
Status: COMPLETED | OUTPATIENT
Start: 2021-12-01 | End: 2021-12-01

## 2021-12-01 RX ADMIN — TRIAMCINOLONE ACETONIDE 20 MG: 40 INJECTION, SUSPENSION INTRA-ARTICULAR; INTRAMUSCULAR at 10:14

## 2021-12-01 RX ADMIN — TRIAMCINOLONE ACETONIDE 40 MG: 40 INJECTION, SUSPENSION INTRA-ARTICULAR; INTRAMUSCULAR at 10:14

## 2021-12-01 RX ADMIN — LIDOCAINE HYDROCHLORIDE 0.5 ML: 10 INJECTION, SOLUTION INFILTRATION; PERINEURAL at 10:14

## 2021-12-01 RX ADMIN — BUPIVACAINE HYDROCHLORIDE 0.5 ML: 2.5 INJECTION, SOLUTION INFILTRATION; PERINEURAL at 10:14

## 2021-12-01 RX ADMIN — LIDOCAINE HYDROCHLORIDE 1 ML: 10 INJECTION, SOLUTION INFILTRATION; PERINEURAL at 10:14

## 2022-01-19 ENCOUNTER — IMMUNIZATIONS (OUTPATIENT)
Dept: FAMILY MEDICINE CLINIC | Facility: HOSPITAL | Age: 77
End: 2022-01-19

## 2022-01-19 DIAGNOSIS — Z23 ENCOUNTER FOR IMMUNIZATION: Primary | ICD-10-CM

## 2022-01-19 PROCEDURE — 91306 COVID-19 MODERNA VACC 0.25 ML BOOSTER: CPT

## 2022-01-19 PROCEDURE — 0064A COVID-19 MODERNA VACC 0.25 ML BOOSTER: CPT

## 2022-01-20 ENCOUNTER — OFFICE VISIT (OUTPATIENT)
Dept: OBGYN CLINIC | Facility: CLINIC | Age: 77
End: 2022-01-20
Payer: COMMERCIAL

## 2022-01-20 VITALS
DIASTOLIC BLOOD PRESSURE: 81 MMHG | HEIGHT: 73 IN | HEART RATE: 76 BPM | BODY MASS INDEX: 28.49 KG/M2 | RESPIRATION RATE: 17 BRPM | SYSTOLIC BLOOD PRESSURE: 135 MMHG | WEIGHT: 215 LBS

## 2022-01-20 DIAGNOSIS — M19.132 SLAC (SCAPHOLUNATE ADVANCED COLLAPSE) OF WRIST, LEFT: ICD-10-CM

## 2022-01-20 DIAGNOSIS — M18.12 ARTHRITIS OF CARPOMETACARPAL (CMC) JOINT OF LEFT THUMB: ICD-10-CM

## 2022-01-20 DIAGNOSIS — S63.8X9A TEAR OF SCAPHOLUNATE LIGAMENT: Primary | ICD-10-CM

## 2022-01-20 PROCEDURE — 1160F RVW MEDS BY RX/DR IN RCRD: CPT | Performed by: SURGERY

## 2022-01-20 PROCEDURE — 1036F TOBACCO NON-USER: CPT | Performed by: SURGERY

## 2022-01-20 PROCEDURE — 99213 OFFICE O/P EST LOW 20 MIN: CPT | Performed by: SURGERY

## 2022-01-20 NOTE — PATIENT INSTRUCTIONS
Wear the brace for comfort and support with activities like shoveling  Take frequent breaks and modify the activity  Use the Voltaren gel 1% over the joints and arthritis likes warmth

## 2022-01-20 NOTE — PROGRESS NOTES
ASSESSMENT/PLAN:      68year old male here for his left thumb cmc joint OA and SLAC wrist  Discussed that cortisone injections can be safely done every 3-4 months as needed for pain and discomfort  At this time since the injections continue to give him continued relief, we will continue to treat conservatively  Recommend to continue with the Voltaren gel and thumb comfort cool, modify his active with heavy lifting and the use of warm water with help the arthritic joints after activities  The patient verbalized understanding of exam findings and treatment plan  We engaged in the shared decision-making process and treatment options were discussed at length with the patient  Surgical and conservative management discussed today along with risks and benefits  Diagnoses and all orders for this visit:    Tear of scapholunate ligament    Slac (scapholunate advanced collapse) of wrist, left    Arthritis of carpometacarpal (CMC) joint of left thumb        Follow Up:  Return if symptoms worsen or fail to improve  To Do Next Visit:  Re-evaluation of current issue    ____________________________________________________________________________________________________________________________________________      CHIEF COMPLAINT:  Chief Complaint   Patient presents with    Left Wrist - Pain, Follow-up    Left Thumb - Follow-up, Pain       SUBJECTIVE:  Virginia Favre is a 68y o  year old LHD male who presents today for 6 week follow-up in regards to his left SLAC wrist and left thumb CMC joint arthritis  At his last visit on 12/01/2021 patient had cortisone injections in both the thumb CMC joint and the radiocarpal joint is manageable  He has been wearing the thumb Comfort Cool for his thumb along the Voltaren gel  I have personally reviewed all the relevant PMH, PSH, SH, FH, Medications and allergies       PAST MEDICAL HISTORY:  Past Medical History:   Diagnosis Date    Benign essential hypertension     Broken ribs     Chest pain        PAST SURGICAL HISTORY:  Past Surgical History:   Procedure Laterality Date    APPENDECTOMY      LUMBAR LAMINECTOMY Bilateral 2018    TONSILLECTOMY         FAMILY HISTORY:  Family History   Problem Relation Age of Onset    Colon cancer Mother     Cancer Father     Thyroid cancer Father     Other Father         MOTOR VEHICLE TRAFFIC ACCIDENT    Ovarian cancer Sister        SOCIAL HISTORY:  Social History     Tobacco Use    Smoking status: Former Smoker     Quit date: 1960     Years since quittin 0    Smokeless tobacco: Never Used   Vaping Use    Vaping Use: Never used   Substance Use Topics    Alcohol use: Yes     Comment: Rare    Drug use: Not Currently       MEDICATIONS:    Current Outpatient Medications:     amLODIPine (NORVASC) 5 mg tablet, Take 1 tablet (5 mg total) by mouth daily, Disp: 90 tablet, Rfl: 3    ascorbic acid (VITAMIN C) 1000 MG tablet, Take 1,000 mg by mouth, Disp: , Rfl:     cetirizine (ZyrTEC) 10 mg tablet, Take 1 tablet by mouth, Disp: , Rfl:     Cholecalciferol (Vitamin D3) 50 MCG (2000 UT) capsule, Take 1 capsule (2,000 Units total) by mouth daily, Disp: , Rfl: 0    DAILY MULTIPLE VITAMINS tablet, Take 1 tablet by mouth daily, Disp: , Rfl:     Diclofenac Sodium (VOLTAREN) 1 %, Apply 2 g topically 4 (four) times a day, Disp: 2 g, Rfl: 2    fluticasone (FLONASE) 50 mcg/act nasal spray, SHAKE LIQUID AND USE 2 SPRAYS IN EACH NOSTRIL DAILY, Disp: 48 g, Rfl: 11    glucosamine-chondroitin 500-400 MG tablet, Take 1 tablet by mouth daily, Disp: , Rfl:     hydrocortisone 2 5 % cream, Apply topically 2 (two) times a day as needed for rash, Disp: 30 g, Rfl: 5    losartan (COZAAR) 100 MG tablet, Take 1 tablet (100 mg total) by mouth daily, Disp: 90 tablet, Rfl: 3    rosuvastatin (CRESTOR) 10 MG tablet, Take 1 tablet (10 mg total) by mouth daily, Disp: 90 tablet, Rfl: 3    ALLERGIES:  Allergies   Allergen Reactions    Diazepam      Increased anger     Gabapentin Other (See Comments)     Severe mood swings, anger       REVIEW OF SYSTEMS:  Review of Systems   Constitutional: Negative for chills, fever and unexpected weight change  HENT: Negative for hearing loss, nosebleeds and sore throat  Eyes: Negative for pain, redness and visual disturbance  Respiratory: Negative for cough, shortness of breath and wheezing  Cardiovascular: Negative for chest pain, palpitations and leg swelling  Gastrointestinal: Negative for abdominal pain, nausea and vomiting  Endocrine: Negative for polydipsia and polyuria  Genitourinary: Negative for dysuria and hematuria  Skin: Negative for rash and wound  Neurological: Negative for dizziness, light-headedness and headaches  Psychiatric/Behavioral: Negative for decreased concentration, dysphoric mood and suicidal ideas  The patient is not nervous/anxious  VITALS:  Vitals:    01/20/22 0927   BP: 135/81   Pulse: 76   Resp: 17       LABS:  HgA1c: No results found for: HGBA1C  BMP:   Lab Results   Component Value Date    GLUCOSE 98 05/14/2014    CALCIUM 9 5 07/13/2021     05/14/2014    K 4 7 07/13/2021    CO2 26 07/13/2021     07/13/2021    BUN 18 07/13/2021    CREATININE 1 26 07/13/2021       _____________________________________________________  PHYSICAL EXAMINATION:  General: well developed and well nourished, alert, oriented times 3 and appears comfortable  Psychiatric: Normal  HEENT: Normocephalic, Atraumatic Trachea Midline, No torticollis  Pulmonary: No audible wheezing or respiratory distress   Cardiovascular: No pitting edema, 2+ radial pulse   Abdominal/GI: abdomen non tender, non distended   Skin: No masses, erythema, lacerations, fluctation, ulcerations  Neurovascular: Sensation Intact to the Median, Ulnar, Radial Nerve, Motor Intact to the Median, Ulnar, Radial Nerve and Pulses Intact  Musculoskeletal: Normal, except as noted in detailed exam and in HPI        MUSCULOSKELETAL EXAMINATION:  Left wrist:     SL mildly tender to palpation   Wrist ROM WNL  Full composite fist   2+ radial pulse       left CMC Exam:  No adduction contracture  No hyperextension deformity of MCP joint  Positive localized tenderness over radial and dorsal aspect of thumb (CMC joint)  Grind test is Positive for pain and Positive for crepitus  Metacarpal load shift test Positive   No triggering or tenderness over the A1 pulley  Positive pain with Finkelsteins maneuver     ___________________________________________________  STUDIES REVIEWED:  No images reviewed at today's visit        PROCEDURES PERFORMED:  Procedures  No Procedures performed today    _____________________________________________________      Radha Angulo    I,:  Tati Awad am acting as a scribe while in the presence of the attending physician :       I,:  Tiana Earl MD personally performed the services described in this documentation    as scribed in my presence :

## 2022-03-23 ENCOUNTER — APPOINTMENT (OUTPATIENT)
Dept: LAB | Age: 77
End: 2022-03-23
Payer: COMMERCIAL

## 2022-03-23 DIAGNOSIS — Z12.5 PROSTATE CANCER SCREENING: ICD-10-CM

## 2022-03-23 DIAGNOSIS — I65.22 STENOSIS OF LEFT CAROTID ARTERY: ICD-10-CM

## 2022-03-23 DIAGNOSIS — I10 BENIGN ESSENTIAL HTN: ICD-10-CM

## 2022-03-23 DIAGNOSIS — I10 HYPERTENSION, UNSPECIFIED TYPE: ICD-10-CM

## 2022-03-23 LAB
ALBUMIN SERPL BCP-MCNC: 3.9 G/DL (ref 3.5–5)
ALP SERPL-CCNC: 67 U/L (ref 46–116)
ALT SERPL W P-5'-P-CCNC: 22 U/L (ref 12–78)
ANION GAP SERPL CALCULATED.3IONS-SCNC: 2 MMOL/L (ref 4–13)
AST SERPL W P-5'-P-CCNC: 21 U/L (ref 5–45)
BASOPHILS # BLD AUTO: 0.08 THOUSANDS/ΜL (ref 0–0.1)
BASOPHILS NFR BLD AUTO: 1 % (ref 0–1)
BILIRUB SERPL-MCNC: 1.27 MG/DL (ref 0.2–1)
BUN SERPL-MCNC: 21 MG/DL (ref 5–25)
CALCIUM SERPL-MCNC: 9.7 MG/DL (ref 8.3–10.1)
CHLORIDE SERPL-SCNC: 112 MMOL/L (ref 100–108)
CHOLEST SERPL-MCNC: 146 MG/DL
CO2 SERPL-SCNC: 29 MMOL/L (ref 21–32)
CREAT SERPL-MCNC: 1.25 MG/DL (ref 0.6–1.3)
EOSINOPHIL # BLD AUTO: 0.37 THOUSAND/ΜL (ref 0–0.61)
EOSINOPHIL NFR BLD AUTO: 5 % (ref 0–6)
ERYTHROCYTE [DISTWIDTH] IN BLOOD BY AUTOMATED COUNT: 13.6 % (ref 11.6–15.1)
GFR SERPL CREATININE-BSD FRML MDRD: 55 ML/MIN/1.73SQ M
GLUCOSE P FAST SERPL-MCNC: 103 MG/DL (ref 65–99)
HCT VFR BLD AUTO: 43.8 % (ref 36.5–49.3)
HDLC SERPL-MCNC: 62 MG/DL
HGB BLD-MCNC: 14.6 G/DL (ref 12–17)
IMM GRANULOCYTES # BLD AUTO: 0.03 THOUSAND/UL (ref 0–0.2)
IMM GRANULOCYTES NFR BLD AUTO: 0 % (ref 0–2)
LDLC SERPL CALC-MCNC: 70 MG/DL (ref 0–100)
LYMPHOCYTES # BLD AUTO: 1.18 THOUSANDS/ΜL (ref 0.6–4.47)
LYMPHOCYTES NFR BLD AUTO: 16 % (ref 14–44)
MCH RBC QN AUTO: 30.5 PG (ref 26.8–34.3)
MCHC RBC AUTO-ENTMCNC: 33.3 G/DL (ref 31.4–37.4)
MCV RBC AUTO: 91 FL (ref 82–98)
MONOCYTES # BLD AUTO: 0.55 THOUSAND/ΜL (ref 0.17–1.22)
MONOCYTES NFR BLD AUTO: 7 % (ref 4–12)
NEUTROPHILS # BLD AUTO: 5.34 THOUSANDS/ΜL (ref 1.85–7.62)
NEUTS SEG NFR BLD AUTO: 71 % (ref 43–75)
NRBC BLD AUTO-RTO: 0 /100 WBCS
PLATELET # BLD AUTO: 288 THOUSANDS/UL (ref 149–390)
PMV BLD AUTO: 11.1 FL (ref 8.9–12.7)
POTASSIUM SERPL-SCNC: 5.2 MMOL/L (ref 3.5–5.3)
PROT SERPL-MCNC: 7.2 G/DL (ref 6.4–8.2)
PSA SERPL-MCNC: 1.4 NG/ML (ref 0–4)
RBC # BLD AUTO: 4.79 MILLION/UL (ref 3.88–5.62)
SODIUM SERPL-SCNC: 143 MMOL/L (ref 136–145)
TRIGL SERPL-MCNC: 71 MG/DL
WBC # BLD AUTO: 7.55 THOUSAND/UL (ref 4.31–10.16)

## 2022-03-23 PROCEDURE — 36415 COLL VENOUS BLD VENIPUNCTURE: CPT

## 2022-03-23 PROCEDURE — G0103 PSA SCREENING: HCPCS

## 2022-03-23 PROCEDURE — 85025 COMPLETE CBC W/AUTO DIFF WBC: CPT

## 2022-03-23 PROCEDURE — 80061 LIPID PANEL: CPT

## 2022-03-23 PROCEDURE — 80053 COMPREHEN METABOLIC PANEL: CPT

## 2022-03-28 ENCOUNTER — APPOINTMENT (OUTPATIENT)
Dept: RADIOLOGY | Facility: MEDICAL CENTER | Age: 77
End: 2022-03-28
Payer: COMMERCIAL

## 2022-03-28 ENCOUNTER — OFFICE VISIT (OUTPATIENT)
Dept: FAMILY MEDICINE CLINIC | Facility: CLINIC | Age: 77
End: 2022-03-28
Payer: COMMERCIAL

## 2022-03-28 VITALS
DIASTOLIC BLOOD PRESSURE: 70 MMHG | RESPIRATION RATE: 18 BRPM | BODY MASS INDEX: 27.96 KG/M2 | SYSTOLIC BLOOD PRESSURE: 138 MMHG | OXYGEN SATURATION: 99 % | HEIGHT: 73 IN | HEART RATE: 64 BPM | WEIGHT: 211 LBS
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
## 2022-03-28 DIAGNOSIS — G89.29 WRIST PAIN, CHRONIC, RIGHT: ICD-10-CM

## 2022-03-28 DIAGNOSIS — M25.531 WRIST PAIN, CHRONIC, RIGHT: ICD-10-CM

## 2022-03-28 DIAGNOSIS — E78.00 HYPERCHOLESTEROLEMIA: ICD-10-CM

## 2022-03-28 DIAGNOSIS — I10 BENIGN ESSENTIAL HYPERTENSION: Primary | ICD-10-CM

## 2022-03-28 DIAGNOSIS — E55.9 VITAMIN D DEFICIENCY: ICD-10-CM

## 2022-03-28 DIAGNOSIS — M54.16 LUMBAR RADICULOPATHY: ICD-10-CM

## 2022-03-28 DIAGNOSIS — R73.9 HYPERGLYCEMIA: ICD-10-CM

## 2022-03-28 DIAGNOSIS — S63.8X9A TEAR OF SCAPHOLUNATE LIGAMENT: ICD-10-CM

## 2022-03-28 DIAGNOSIS — N18.2 STAGE 2 CHRONIC KIDNEY DISEASE: ICD-10-CM

## 2022-03-28 DIAGNOSIS — N40.0 BENIGN PROSTATIC HYPERPLASIA WITHOUT LOWER URINARY TRACT SYMPTOMS: ICD-10-CM

## 2022-03-28 PROBLEM — S69.92XS: Status: RESOLVED | Noted: 2021-03-10 | Resolved: 2022-03-28

## 2022-03-28 PROCEDURE — 1160F RVW MEDS BY RX/DR IN RCRD: CPT | Performed by: FAMILY MEDICINE

## 2022-03-28 PROCEDURE — 99214 OFFICE O/P EST MOD 30 MIN: CPT | Performed by: FAMILY MEDICINE

## 2022-03-28 PROCEDURE — 1036F TOBACCO NON-USER: CPT | Performed by: FAMILY MEDICINE

## 2022-03-28 PROCEDURE — 73130 X-RAY EXAM OF HAND: CPT

## 2022-03-28 PROCEDURE — 3725F SCREEN DEPRESSION PERFORMED: CPT | Performed by: FAMILY MEDICINE

## 2022-03-28 PROCEDURE — 73110 X-RAY EXAM OF WRIST: CPT

## 2022-03-28 NOTE — PROGRESS NOTES
Assessment/Plan:       Problem List Items Addressed This Visit        Cardiovascular and Mediastinum    Benign essential hypertension - Primary    Relevant Orders    CBC and differential    Comprehensive metabolic panel    Lipid Panel with Direct LDL reflex       Nervous and Auditory    Lumbar radiculopathy       Musculoskeletal and Integument    Tear of scapholunate ligament       Genitourinary    Benign enlargement of prostate    Stage 2 chronic kidney disease     Lab Results   Component Value Date    EGFR 55 03/23/2022    EGFR 55 07/13/2021    EGFR 58 03/02/2021    CREATININE 1 25 03/23/2022    CREATININE 1 26 07/13/2021    CREATININE 1 21 03/02/2021   Check labs as outlined  He avoids NSAIDs         Relevant Orders    CBC and differential    Comprehensive metabolic panel       Other    Hypercholesterolemia    Relevant Orders    Comprehensive metabolic panel    Lipid Panel with Direct LDL reflex    Vitamin D deficiency    Relevant Orders    Vitamin D 25 hydroxy    Hyperglycemia     Recent glucose is 103  Check A1c with next labs         Relevant Orders    Hemoglobin A1C      Other Visit Diagnoses     Fall, initial encounter        Relevant Orders    XR wrist 3+ vw right    XR hand 3+ vw right    Wrist pain, chronic, right        Relevant Orders    XR wrist 3+ vw right    XR hand 3+ vw right            Subjective:      Patient ID: Trang Hunt is a 68 y o  male  HPI patient presents today for follow-up for chronic health issues  He did recently have fall up the steps and is having some persistent right wrist pain  He did have a scapholunate tear on his left wrist and has been using a splint  He is following with Orthopedics as needed for this  Recent labs did reveal hyperglycemia which is new for him  Other risk factors remained stable at this time    His home blood pressures have been good      The following portions of the patient's history were reviewed and updated as appropriate: allergies, current medications, past family history, past medical history, past social history, past surgical history and problem list       Current Outpatient Medications:     amLODIPine (NORVASC) 5 mg tablet, Take 1 tablet (5 mg total) by mouth daily, Disp: 90 tablet, Rfl: 3    ascorbic acid (VITAMIN C) 1000 MG tablet, Take 1,000 mg by mouth, Disp: , Rfl:     cetirizine (ZyrTEC) 10 mg tablet, Take 1 tablet by mouth, Disp: , Rfl:     Cholecalciferol (Vitamin D3) 50 MCG (2000 UT) capsule, Take 1 capsule (2,000 Units total) by mouth daily, Disp: , Rfl: 0    DAILY MULTIPLE VITAMINS tablet, Take 1 tablet by mouth daily, Disp: , Rfl:     Diclofenac Sodium (VOLTAREN) 1 %, Apply 2 g topically 4 (four) times a day, Disp: 2 g, Rfl: 2    fluticasone (FLONASE) 50 mcg/act nasal spray, SHAKE LIQUID AND USE 2 SPRAYS IN EACH NOSTRIL DAILY, Disp: 48 g, Rfl: 11    glucosamine-chondroitin 500-400 MG tablet, Take 1 tablet by mouth daily, Disp: , Rfl:     hydrocortisone 2 5 % cream, Apply topically 2 (two) times a day as needed for rash, Disp: 30 g, Rfl: 5    losartan (COZAAR) 100 MG tablet, Take 1 tablet (100 mg total) by mouth daily, Disp: 90 tablet, Rfl: 3    rosuvastatin (CRESTOR) 10 MG tablet, Take 1 tablet (10 mg total) by mouth daily, Disp: 90 tablet, Rfl: 3     Review of Systems   Constitutional: Negative for appetite change, chills, fatigue, fever and unexpected weight change  HENT: Negative for trouble swallowing  Eyes: Negative for visual disturbance  Respiratory: Negative for cough, chest tightness, shortness of breath and wheezing  Cardiovascular: Negative for chest pain, palpitations and leg swelling  Gastrointestinal: Negative for abdominal distention, abdominal pain, blood in stool, constipation and diarrhea  Endocrine: Negative for polyuria  Genitourinary: Negative for difficulty urinating and flank pain  Musculoskeletal: Positive for arthralgias (Right wrist in particular    He still has some pain in his left wrist)  Negative for myalgias  Skin: Negative for rash  Neurological: Negative for dizziness and light-headedness  Hematological: Negative for adenopathy  Does not bruise/bleed easily  Psychiatric/Behavioral: Negative for dysphoric mood and sleep disturbance  The patient is not nervous/anxious  Objective:      /70 (BP Location: Left arm, Patient Position: Sitting, Cuff Size: Large)   Pulse 64   Resp 18   Ht 6' 1" (1 854 m)   Wt 95 7 kg (211 lb)   SpO2 99%   BMI 27 84 kg/m²          Physical Exam  Vitals reviewed  Constitutional:       Appearance: He is well-developed  HENT:      Head: Normocephalic  Cardiovascular:      Rate and Rhythm: Regular rhythm  Heart sounds: Normal heart sounds  No murmur heard  Pulmonary:      Effort: No respiratory distress  Breath sounds: No wheezing or rales  Abdominal:      General: There is no distension  Tenderness: There is no abdominal tenderness  Musculoskeletal:         General: Tenderness (He has some tenderness in the mid wrist region  He has difficulty bearing weight with his right wrist ) present  No swelling  Right lower leg: No edema  Left lower leg: No edema  Skin:     Findings: No erythema or rash  Neurological:      Mental Status: He is alert and oriented to person, place, and time             Roman Boswell MD

## 2022-03-28 NOTE — ASSESSMENT & PLAN NOTE
Lab Results   Component Value Date    EGFR 55 03/23/2022    EGFR 55 07/13/2021    EGFR 58 03/02/2021    CREATININE 1 25 03/23/2022    CREATININE 1 26 07/13/2021    CREATININE 1 21 03/02/2021   Check labs as outlined    He avoids NSAIDs

## 2022-03-29 PROBLEM — M25.531 RIGHT WRIST PAIN: Status: ACTIVE | Noted: 2022-03-29

## 2022-04-04 ENCOUNTER — HOSPITAL ENCOUNTER (OUTPATIENT)
Dept: RADIOLOGY | Facility: HOSPITAL | Age: 77
Discharge: HOME | End: 2022-04-04
Payer: COMMERCIAL

## 2022-04-04 ENCOUNTER — OFFICE VISIT (OUTPATIENT)
Dept: OBGYN CLINIC | Facility: CLINIC | Age: 77
End: 2022-04-04
Payer: COMMERCIAL

## 2022-04-04 VITALS — OXYGEN SATURATION: 98 % | HEIGHT: 73 IN | BODY MASS INDEX: 28.15 KG/M2 | WEIGHT: 212.4 LBS | HEART RATE: 90 BPM

## 2022-04-04 DIAGNOSIS — M25.531 RIGHT WRIST PAIN: ICD-10-CM

## 2022-04-04 DIAGNOSIS — M25.531 RIGHT WRIST PAIN: Primary | ICD-10-CM

## 2022-04-04 DIAGNOSIS — S63.8X9A TEAR OF SCAPHOLUNATE LIGAMENT: ICD-10-CM

## 2022-04-04 PROCEDURE — 73110 X-RAY EXAM OF WRIST: CPT

## 2022-04-04 PROCEDURE — 1160F RVW MEDS BY RX/DR IN RCRD: CPT | Performed by: PHYSICIAN ASSISTANT

## 2022-04-04 PROCEDURE — 1036F TOBACCO NON-USER: CPT | Performed by: PHYSICIAN ASSISTANT

## 2022-04-04 PROCEDURE — 99213 OFFICE O/P EST LOW 20 MIN: CPT | Performed by: PHYSICIAN ASSISTANT

## 2022-04-04 NOTE — PROGRESS NOTES
Assessment/Plan   Diagnoses and all orders for this visit:    Right wrist pain    Tear of scapholunate ligament    - Cock up splint  - Ice as needed  - Follow up with Dr Grey Marcos (current patient for the other wrist)      Subjective   Patient ID: Vale Duval is a 68 y o  male  Vitals:    22 [id number]   Pulse: 90   SpO2: 98%     74yo male comes in for an evaluation of the RIGHT wrist   He is a patient of Dr Grey Marcos for djd and slac wrist in the LEFT wrist   Two weeks ago, he tripped on the stairs at home and fell forward  To protect the previously injured left, he fell onto the outstretched RIGHT hand  Since then, he has been having dorsal wrist pain  He is wearing a sleeve for support and is using ice  The pain is dull in character, mild in severity, pain does not radiate and is not associated with numbness          The following portions of the patient's history were reviewed and updated as appropriate: allergies, current medications, past family history, past medical history, past social history, past surgical history and problem list     Review of Systems  Ortho Exam  Past Medical History:   Diagnosis Date    Benign essential hypertension     Broken ribs     Chest pain      Past Surgical History:   Procedure Laterality Date    APPENDECTOMY      LUMBAR LAMINECTOMY Bilateral 2018    TONSILLECTOMY       Family History   Problem Relation Age of Onset    Colon cancer Mother     Cancer Father     Thyroid cancer Father     Other Father         MOTOR VEHICLE TRAFFIC ACCIDENT    Ovarian cancer Sister      Social History     Occupational History    Not on file   Tobacco Use    Smoking status: Former Smoker     Quit date: 1960     Years since quittin 2    Smokeless tobacco: Never Used   Vaping Use    Vaping Use: Never used   Substance and Sexual Activity    Alcohol use: Yes     Comment: Rare    Drug use: Not Currently    Sexual activity: Not on file       Review of Systems Constitutional: Negative  HENT: Negative  Eyes: Negative  Respiratory: Negative  Cardiovascular: Negative  Gastrointestinal: Negative  Endocrine: Negative  Genitourinary: Negative  Musculoskeletal: As below      Allergic/Immunologic: Negative  Neurological: Negative  Hematological: Negative  Psychiatric/Behavioral: Negative  Objective   Physical Exam    · Constitutional: Awake, Alert, Oriented  · Eyes: EOMI  · Psych: Mood and affect appropriate  · Heart: regular rate   · Lungs: No audible wheezing  · Abdomen: No guarding  · Lymph: no lymphedema   right wrist:  - Appearance   No swelling, discoloration, deformity, or ecchymosis  - Palpation  o + tenderness scapholunate interval dorsal and volar  + tenderness druj  o No tenderness to palpation of distal radius, snuffbox hamate, ulnar wrist, thenar eminence, hypothenar eminence, thumb or hand   - ROM  o palmar flexion 90, dorsiflexion 90, pronation 90, supination 90,  radial deviation 25, ulnar deviation 25  Pain with full palmarflexion and full dorsiflexion   - Motor  o  5/5, wrist extension 5/5, and wrist flexion 5/5, interossi 5/5  - Special Tests  o normal sensation of hand and arm  - NVI distally    I have personally reviewed pertinent films in PACS and my interpretation is Widening of the scapholunate interval on clenched fist view  Elevation of distal ulna vs rotation of view  Irl Pizza

## 2022-04-12 DIAGNOSIS — J30.9 ALLERGIC RHINITIS, UNSPECIFIED SEASONALITY, UNSPECIFIED TRIGGER: ICD-10-CM

## 2022-04-12 RX ORDER — FLUTICASONE PROPIONATE 50 MCG
SPRAY, SUSPENSION (ML) NASAL
Qty: 48 G | Refills: 11 | Status: SHIPPED | OUTPATIENT
Start: 2022-04-12

## 2022-06-02 ENCOUNTER — OFFICE VISIT (OUTPATIENT)
Dept: OBGYN CLINIC | Facility: CLINIC | Age: 77
End: 2022-06-02
Payer: COMMERCIAL

## 2022-06-02 VITALS
DIASTOLIC BLOOD PRESSURE: 77 MMHG | WEIGHT: 212 LBS | HEART RATE: 53 BPM | HEIGHT: 73 IN | BODY MASS INDEX: 28.1 KG/M2 | SYSTOLIC BLOOD PRESSURE: 148 MMHG

## 2022-06-02 DIAGNOSIS — S63.8X9A TEAR OF SCAPHOLUNATE LIGAMENT: ICD-10-CM

## 2022-06-02 DIAGNOSIS — M19.132 SLAC (SCAPHOLUNATE ADVANCED COLLAPSE) OF WRIST, LEFT: ICD-10-CM

## 2022-06-02 DIAGNOSIS — M18.11 ARTHRITIS OF CARPOMETACARPAL (CMC) JOINT OF RIGHT THUMB: ICD-10-CM

## 2022-06-02 DIAGNOSIS — M18.12 ARTHRITIS OF CARPOMETACARPAL (CMC) JOINT OF LEFT THUMB: Primary | ICD-10-CM

## 2022-06-02 PROCEDURE — 20600 DRAIN/INJ JOINT/BURSA W/O US: CPT | Performed by: SURGERY

## 2022-06-02 PROCEDURE — 99214 OFFICE O/P EST MOD 30 MIN: CPT | Performed by: SURGERY

## 2022-06-02 PROCEDURE — 1160F RVW MEDS BY RX/DR IN RCRD: CPT | Performed by: SURGERY

## 2022-06-02 PROCEDURE — 1036F TOBACCO NON-USER: CPT | Performed by: SURGERY

## 2022-06-02 RX ORDER — BUPIVACAINE HYDROCHLORIDE 2.5 MG/ML
0.5 INJECTION, SOLUTION INFILTRATION; PERINEURAL
Status: COMPLETED | OUTPATIENT
Start: 2022-06-02 | End: 2022-06-02

## 2022-06-02 RX ORDER — TRIAMCINOLONE ACETONIDE 40 MG/ML
20 INJECTION, SUSPENSION INTRA-ARTICULAR; INTRAMUSCULAR
Status: COMPLETED | OUTPATIENT
Start: 2022-06-02 | End: 2022-06-02

## 2022-06-02 RX ADMIN — TRIAMCINOLONE ACETONIDE 20 MG: 40 INJECTION, SUSPENSION INTRA-ARTICULAR; INTRAMUSCULAR at 08:53

## 2022-06-02 RX ADMIN — BUPIVACAINE HYDROCHLORIDE 0.5 ML: 2.5 INJECTION, SOLUTION INFILTRATION; PERINEURAL at 08:53

## 2022-06-02 NOTE — PROGRESS NOTES
ASSESSMENT/PLAN:      51-year-old male with bilateral CMC arthritis, left SLAC wrist, and right wrist pain with some SL widening on xray  Discussed continued injections with the patient today, particularly for the bilateral CMC joints which seem to be the most bothersome today  Discussed risks, benefits, and alternatives to steroid injections and bilateral CMC injections were performed today  Right wrist is slightly bothersome intermittently, he may undergo an injection in the future if this continues to bother him  The patient verbalized understanding of exam findings and treatment plan  We engaged in the shared decision-making process and treatment options were discussed at length with the patient  Surgical and conservative management discussed today along with risks and benefits  Diagnoses and all orders for this visit:    Arthritis of carpometacarpal [place]) joint of left thumb  -     Small joint arthrocentesis    Arthritis of carpometacarpal (CMC) joint of right thumb  -     Small joint arthrocentesis    Slac (scapholunate advanced collapse) of wrist, left    Tear of scapholunate ligament        Follow Up:  Return if symptoms worsen or fail to improve  To Do Next Visit:  Re-evaluation of current issue    ____________________________________________________________________________________________________________________________________________      CHIEF COMPLAINT:  Chief Complaint   Patient presents with    Right Wrist - Pain       SUBJECTIVE:  Any Voss is a 68y o  year old RHD male who presents for evaluation of the right wrist following a fall a few months ago  Patient has previously been seen for his left SLAC wrist and left CMC arthritis which has been treated with steroid injections  When the patient sustained a fall he was protecting left wrist and landed on the right    Patient seen by Tenzin Coyiot [facility] who placed him in a wrist brace and took an x-ray which was negative for fracture  Currently patient notes pain at bilateral CMC joints as well as the right wrist   He does use Voltaren gel as needed  I have personally reviewed all the relevant PMH, PSH, SH, FH, Medications and allergies       PAST MEDICAL HISTORY:  Past Medical History:   Diagnosis Date    Benign essential hypertension     Broken ribs     Chest pain        PAST SURGICAL HISTORY:  Past Surgical History:   Procedure Laterality Date    APPENDECTOMY      LUMBAR LAMINECTOMY Bilateral 2018    TONSILLECTOMY         FAMILY HISTORY:  Family History   Problem Relation Age of Onset    Colon cancer Mother     Cancer Father     Thyroid cancer Father     Other Father         MOTOR VEHICLE TRAFFIC ACCIDENT    Ovarian cancer Sister        SOCIAL HISTORY:  Social History     Tobacco Use    Smoking status: Former Smoker     Quit date:      Years since quittin 4    Smokeless tobacco: Never Used   Vaping Use    Vaping Use: Never used   Substance Use Topics    Alcohol use: Yes     Comment: Rare    Drug use: Not Currently       MEDICATIONS:    Current Outpatient Medications:     amLODIPine (NORVASC) 5 mg tablet, Take 1 tablet (5 mg total) by mouth daily, Disp: 90 tablet, Rfl: 3    ascorbic acid (VITAMIN C) 1000 MG tablet, Take 1,000 mg by mouth, Disp: , Rfl:     cetirizine (ZyrTEC) 10 mg tablet, Take 1 tablet by mouth, Disp: , Rfl:     Cholecalciferol (Vitamin D3) 50 MCG (2000 UT) capsule, Take 1 capsule (2,000 Units total) by mouth daily, Disp: , Rfl: 0    DAILY MULTIPLE VITAMINS tablet, Take 1 tablet by mouth daily, Disp: , Rfl:     Diclofenac Sodium (VOLTAREN) 1 %, Apply 2 g topically 4 (four) times a day, Disp: 2 g, Rfl: 2    fluticasone (FLONASE) 50 mcg/act nasal spray, SHAKE LIQUID AND USE 2 SPRAYS IN EACH NOSTRIL DAILY, Disp: 48 g, Rfl: 11    glucosamine-chondroitin 500-400 MG tablet, Take 1 tablet by mouth daily, Disp: , Rfl:     hydrocortisone 2 5 % cream, Apply topically 2 (two) times a day as needed for rash, Disp: 30 g, Rfl: 5    losartan (COZAAR) 100 MG tablet, Take 1 tablet (100 mg total) by mouth daily, Disp: 90 tablet, Rfl: 3    rosuvastatin (CRESTOR) 10 MG tablet, Take 1 tablet (10 mg total) by mouth daily, Disp: 90 tablet, Rfl: 3    ALLERGIES:  Allergies   Allergen Reactions    Diazepam      Increased anger   Gabapentin Other (See Comments)     Severe mood swings, anger       REVIEW OF SYSTEMS:  Review of Systems   Constitutional: Negative for chills, fatigue, fever and unexpected weight change  HENT: Negative for hearing loss, nosebleeds and sore throat  Eyes: Negative for pain, redness and visual disturbance  Respiratory: Negative for cough, shortness of breath and wheezing  Cardiovascular: Negative for chest pain, palpitations and leg swelling  Gastrointestinal: Negative for abdominal pain, nausea and vomiting  Endocrine: Negative for polydipsia and polyuria  Genitourinary: Negative for difficulty urinating and hematuria  Musculoskeletal: Positive for arthralgias  Negative for joint swelling and myalgias  Skin: Negative for rash and wound  Neurological: Negative for dizziness, numbness and headaches  Psychiatric/Behavioral: Negative for decreased concentration, dysphoric mood and suicidal ideas  The patient is not nervous/anxious          VITALS:  Vitals:    06/02/22 0756   BP: 148/77   Pulse: (!) 53       LABS:  HgA1c: No results found for: HGBA1C  BMP:   Lab Results   Component Value Date    GLUCOSE 98 05/14/2014    CALCIUM 9 7 03/23/2022     05/14/2014    K 5 2 03/23/2022    CO2 29 03/23/2022     (H) 03/23/2022    BUN 21 03/23/2022    CREATININE 1 25 03/23/2022       _____________________________________________________  PHYSICAL EXAMINATION:  General: well developed and well nourished, alert, oriented times 3 and appears comfortable  Psychiatric: Normal  HEENT: Normocephalic, Atraumatic Trachea Midline, No torticollis  Pulmonary: No audible wheezing or respiratory distress   Cardiovascular: No pitting edema, 2+ radial pulse   Skin: No masses, erythema, lacerations, fluctation, ulcerations  Neurovascular: Sensation Intact to the Median, Ulnar, Radial Nerve, Motor Intact to the Median, Ulnar, Radial Nerve and Pulses Intact  Musculoskeletal: Normal, except as noted in detailed exam and in HPI  MUSCULOSKELETAL EXAMINATION:    Bilateral CMC Exam:  No adduction contracture  No hyperextension deformity of MCP joint  Positive localized tenderness over radial and dorsal aspect of thumb (CMC joint)  Grind test is Positive for pain and Positive for crepitus  Metacarpal load shift test positive for pain   No triggering or tenderness over the A1 pulley  No pain with Finkelsteins maneuver     Tender at right radiocarpal joint and SL         ___________________________________________________  STUDIES REVIEWED:  I have personally reviewed AP lateral and oblique radiographs of right wrist which demonstrate degenerative changes at radioscaphoid joint as well as some increased SL widening and degenerative changes at right [facility] joint            PROCEDURES PERFORMED:  Small joint arthrocentesis: bilateral thumb CMC  Universal Protocol:  Consent: Verbal consent obtained  Risks and benefits: risks, benefits and alternatives were discussed  Consent given by: patient  Time out: Immediately prior to procedure a "time out" was called to verify the correct patient, procedure, equipment, support staff and site/side marked as required  Patient understanding: patient states understanding of the procedure being performed  Site marked: the operative site was marked  Radiology Images displayed and confirmed   If images not available, report reviewed: imaging studies available  Required items: required blood products, implants, devices, and special equipment available  Patient identity confirmed: verbally with patient    Supporting Documentation  Indications: pain   Procedure Details  Location: thumb - bilateral thumb CMC  Needle size: 25 G  Ultrasound guidance: no  Approach: dorsal    Medications (Right): 0 5 mL bupivacaine 0 25 %; 20 mg triamcinolone acetonide 40 mg/mLMedications (Left): 0 5 mL bupivacaine 0 25 %; 20 mg triamcinolone acetonide 40 mg/mL   Patient tolerance: patient tolerated the procedure well with no immediate complications  Dressing:  Sterile dressing applied             _____________________________________________________    Scribe Attestation    I,:  Annamarie Baltazar PA-C am acting as a scribe while in the presence of the attending physician :       I,:  Lupe Stover MD personally performed the services described in this documentation    as scribed in my presence :

## 2022-08-09 ENCOUNTER — LAB (OUTPATIENT)
Dept: LAB | Age: 77
End: 2022-08-09
Payer: COMMERCIAL

## 2022-08-09 DIAGNOSIS — N18.2 STAGE 2 CHRONIC KIDNEY DISEASE: ICD-10-CM

## 2022-08-09 DIAGNOSIS — R73.9 HYPERGLYCEMIA: ICD-10-CM

## 2022-08-09 DIAGNOSIS — E78.00 HYPERCHOLESTEROLEMIA: ICD-10-CM

## 2022-08-09 DIAGNOSIS — E55.9 VITAMIN D DEFICIENCY: ICD-10-CM

## 2022-08-09 DIAGNOSIS — I10 BENIGN ESSENTIAL HYPERTENSION: ICD-10-CM

## 2022-08-09 LAB
25(OH)D3 SERPL-MCNC: 47.7 NG/ML (ref 30–100)
ALBUMIN SERPL BCP-MCNC: 3.6 G/DL (ref 3.5–5)
ALP SERPL-CCNC: 60 U/L (ref 46–116)
ALT SERPL W P-5'-P-CCNC: 19 U/L (ref 12–78)
ANION GAP SERPL CALCULATED.3IONS-SCNC: 6 MMOL/L (ref 4–13)
AST SERPL W P-5'-P-CCNC: 16 U/L (ref 5–45)
BASOPHILS # BLD AUTO: 0.06 THOUSANDS/ΜL (ref 0–0.1)
BASOPHILS NFR BLD AUTO: 1 % (ref 0–1)
BILIRUB SERPL-MCNC: 1.65 MG/DL (ref 0.2–1)
BUN SERPL-MCNC: 18 MG/DL (ref 5–25)
CALCIUM SERPL-MCNC: 9.3 MG/DL (ref 8.3–10.1)
CHLORIDE SERPL-SCNC: 108 MMOL/L (ref 96–108)
CHOLEST SERPL-MCNC: 153 MG/DL
CO2 SERPL-SCNC: 26 MMOL/L (ref 21–32)
CREAT SERPL-MCNC: 1.33 MG/DL (ref 0.6–1.3)
EOSINOPHIL # BLD AUTO: 0.24 THOUSAND/ΜL (ref 0–0.61)
EOSINOPHIL NFR BLD AUTO: 3 % (ref 0–6)
ERYTHROCYTE [DISTWIDTH] IN BLOOD BY AUTOMATED COUNT: 13.5 % (ref 11.6–15.1)
EST. AVERAGE GLUCOSE BLD GHB EST-MCNC: 117 MG/DL
GFR SERPL CREATININE-BSD FRML MDRD: 51 ML/MIN/1.73SQ M
GLUCOSE P FAST SERPL-MCNC: 96 MG/DL (ref 65–99)
HBA1C MFR BLD: 5.7 %
HCT VFR BLD AUTO: 44.6 % (ref 36.5–49.3)
HDLC SERPL-MCNC: 62 MG/DL
HGB BLD-MCNC: 14.7 G/DL (ref 12–17)
IMM GRANULOCYTES # BLD AUTO: 0.03 THOUSAND/UL (ref 0–0.2)
IMM GRANULOCYTES NFR BLD AUTO: 0 % (ref 0–2)
LDLC SERPL CALC-MCNC: 76 MG/DL (ref 0–100)
LYMPHOCYTES # BLD AUTO: 1.68 THOUSANDS/ΜL (ref 0.6–4.47)
LYMPHOCYTES NFR BLD AUTO: 23 % (ref 14–44)
MCH RBC QN AUTO: 30.8 PG (ref 26.8–34.3)
MCHC RBC AUTO-ENTMCNC: 33 G/DL (ref 31.4–37.4)
MCV RBC AUTO: 94 FL (ref 82–98)
MONOCYTES # BLD AUTO: 0.48 THOUSAND/ΜL (ref 0.17–1.22)
MONOCYTES NFR BLD AUTO: 7 % (ref 4–12)
NEUTROPHILS # BLD AUTO: 4.82 THOUSANDS/ΜL (ref 1.85–7.62)
NEUTS SEG NFR BLD AUTO: 66 % (ref 43–75)
NRBC BLD AUTO-RTO: 0 /100 WBCS
PLATELET # BLD AUTO: 259 THOUSANDS/UL (ref 149–390)
PMV BLD AUTO: 10.9 FL (ref 8.9–12.7)
POTASSIUM SERPL-SCNC: 4.7 MMOL/L (ref 3.5–5.3)
PROT SERPL-MCNC: 6.9 G/DL (ref 6.4–8.4)
RBC # BLD AUTO: 4.77 MILLION/UL (ref 3.88–5.62)
SODIUM SERPL-SCNC: 140 MMOL/L (ref 135–147)
TRIGL SERPL-MCNC: 76 MG/DL
WBC # BLD AUTO: 7.31 THOUSAND/UL (ref 4.31–10.16)

## 2022-08-09 PROCEDURE — 82306 VITAMIN D 25 HYDROXY: CPT

## 2022-08-09 PROCEDURE — 85025 COMPLETE CBC W/AUTO DIFF WBC: CPT

## 2022-08-09 PROCEDURE — 83036 HEMOGLOBIN GLYCOSYLATED A1C: CPT

## 2022-08-09 PROCEDURE — 36415 COLL VENOUS BLD VENIPUNCTURE: CPT

## 2022-08-09 PROCEDURE — 80061 LIPID PANEL: CPT

## 2022-08-09 PROCEDURE — 80053 COMPREHEN METABOLIC PANEL: CPT

## 2022-10-03 ENCOUNTER — OFFICE VISIT (OUTPATIENT)
Dept: FAMILY MEDICINE CLINIC | Facility: CLINIC | Age: 77
End: 2022-10-03
Payer: COMMERCIAL

## 2022-10-03 VITALS
OXYGEN SATURATION: 99 % | HEART RATE: 62 BPM | RESPIRATION RATE: 18 BRPM | TEMPERATURE: 96.8 F | BODY MASS INDEX: 26.43 KG/M2 | SYSTOLIC BLOOD PRESSURE: 138 MMHG | WEIGHT: 199.4 LBS | DIASTOLIC BLOOD PRESSURE: 78 MMHG | HEIGHT: 73 IN

## 2022-10-03 DIAGNOSIS — I10 BENIGN ESSENTIAL HTN: ICD-10-CM

## 2022-10-03 DIAGNOSIS — N18.31 STAGE 3A CHRONIC KIDNEY DISEASE (HCC): ICD-10-CM

## 2022-10-03 DIAGNOSIS — R63.4 WEIGHT LOSS: ICD-10-CM

## 2022-10-03 DIAGNOSIS — N18.2 STAGE 2 CHRONIC KIDNEY DISEASE: ICD-10-CM

## 2022-10-03 DIAGNOSIS — Z00.00 WELL ADULT ON ROUTINE HEALTH CHECK: Primary | ICD-10-CM

## 2022-10-03 DIAGNOSIS — I10 BENIGN ESSENTIAL HYPERTENSION: ICD-10-CM

## 2022-10-03 DIAGNOSIS — E55.9 VITAMIN D DEFICIENCY: ICD-10-CM

## 2022-10-03 DIAGNOSIS — R73.9 HYPERGLYCEMIA: ICD-10-CM

## 2022-10-03 DIAGNOSIS — I65.22 STENOSIS OF LEFT CAROTID ARTERY: ICD-10-CM

## 2022-10-03 DIAGNOSIS — Z12.5 PROSTATE CANCER SCREENING: ICD-10-CM

## 2022-10-03 DIAGNOSIS — L20.84 INTRINSIC ECZEMA: ICD-10-CM

## 2022-10-03 DIAGNOSIS — I10 HYPERTENSION, UNSPECIFIED TYPE: ICD-10-CM

## 2022-10-03 DIAGNOSIS — E78.00 HYPERCHOLESTEROLEMIA: ICD-10-CM

## 2022-10-03 PROBLEM — M25.531 RIGHT WRIST PAIN: Status: RESOLVED | Noted: 2022-03-29 | Resolved: 2022-10-03

## 2022-10-03 PROCEDURE — 99397 PER PM REEVAL EST PAT 65+ YR: CPT | Performed by: FAMILY MEDICINE

## 2022-10-03 RX ORDER — AMLODIPINE BESYLATE 5 MG/1
5 TABLET ORAL DAILY
Qty: 90 TABLET | Refills: 3 | Status: SHIPPED | OUTPATIENT
Start: 2022-10-03

## 2022-10-03 RX ORDER — LOSARTAN POTASSIUM 100 MG/1
100 TABLET ORAL DAILY
Qty: 90 TABLET | Refills: 3 | Status: SHIPPED | OUTPATIENT
Start: 2022-10-03

## 2022-10-03 RX ORDER — ROSUVASTATIN CALCIUM 10 MG/1
10 TABLET, COATED ORAL DAILY
Qty: 90 TABLET | Refills: 3 | Status: SHIPPED | OUTPATIENT
Start: 2022-10-03

## 2022-10-03 NOTE — PROGRESS NOTES
[address] [facility]    NAME: Stephanie Raphael  AGE: 68 y o   SEX: male  : 1945     DATE: 10/3/2022     Assessment and Plan:     Problem List Items Addressed This Visit        Cardiovascular and Mediastinum    Benign essential hypertension    Relevant Medications    losartan (COZAAR) 100 MG tablet    amLODIPine (NORVASC) 5 mg tablet    Other Relevant Orders    CBC and differential    Comprehensive metabolic panel    Lipid Panel with Direct LDL reflex    Stenosis of left carotid artery    Relevant Orders    VAS carotid complete study       Musculoskeletal and Integument    Intrinsic eczema    Relevant Orders    TSH, 3rd generation with Free T4 reflex       Genitourinary    Stage 2 chronic kidney disease    Relevant Orders    Comprehensive metabolic panel    UA (URINE) with reflex to Scope    Stage 3a chronic kidney disease (HCC)       Other    Hypercholesterolemia    Relevant Medications    rosuvastatin (CRESTOR) 10 MG tablet    Other Relevant Orders    CBC and differential    Comprehensive metabolic panel    Lipid Panel with Direct LDL reflex    Vitamin D deficiency    Relevant Orders    Vitamin D 25 hydroxy    Hyperglycemia    Relevant Orders    Comprehensive metabolic panel    Hemoglobin A1C      Other Visit Diagnoses     Well adult on routine health check    -  Primary    Benign essential HTN        Relevant Medications    losartan (COZAAR) 100 MG tablet    amLODIPine (NORVASC) 5 mg tablet    Other Relevant Orders    CBC and differential    Comprehensive metabolic panel    Hypertension, unspecified type        Relevant Medications    losartan (COZAAR) 100 MG tablet    amLODIPine (NORVASC) 5 mg tablet    Other Relevant Orders    CBC and differential    Comprehensive metabolic panel    Weight loss        Relevant Orders    UA (URINE) with reflex to Scope    Prostate cancer screening        Relevant Orders    PSA, Total Screen Immunizations and preventive care screenings were discussed with patient today  Appropriate education was printed on patient's after visit summary  Discussed risks and benefits of prostate cancer screening  We discussed the controversial history of PSA screening for prostate cancer in the [place] as well as the risk of over detection and over treatment of prostate cancer by way of PSA screening  The patient understands that PSA blood testing is an imperfect way to screen for prostate cancer and that elevated PSA levels in the blood may also be caused by infection, inflammation, prostatic trauma or manipulation, urological procedures, or by benign prostatic enlargement  The role of the digital rectal examination in prostate cancer screening was also discussed and I discussed with him that there is large interobserver variability in the findings of digital rectal examination  Counseling:  Alcohol/drug use: discussed moderation in alcohol intake, the recommendations for healthy alcohol use, and avoidance of illicit drug use  Dental Health: discussed importance of regular tooth brushing, flossing, and dental visits  Exercise: the importance of regular exercise/physical activity was discussed  Recommend exercise 3-5 times per week for at least 30 minutes  No follow-ups on file  Chief Complaint:     Chief Complaint   Patient presents with    Physical Exam      History of Present Illness:     Adult Annual Physical   Patient here for a comprehensive physical exam  The patient reports that he is feeling well  He has lost weight   he notes he feels well and feels the weight loss may be coming from his ongoing exercise as well as excellent diet  He continues to exercise routinely with swimming  He has no cardiovascular limitations exercise  He is followed by dermatology  He is up-to-date on cancer screening    He would like to hold off on Shingrix vaccination as well as flu shot    Diet and Physical Activity  Diet/Nutrition: well balanced diet  Exercise: vigorous cardiovascular exercise  Depression Screening  PHQ-2/9 Depression Screening    Little interest or pleasure in doing things: 1 - several days  Feeling down, depressed, or hopeless: 0 - not at all       General Health  Sleep: sleeps well  Hearing: normal - bilateral   Vision: goes for regular eye exams  Dental: regular dental visits   Health  Symptoms include: none     Review of Systems:     Review of Systems   Constitutional: Negative for appetite change, chills, fatigue, fever and unexpected weight change  HENT: Negative for trouble swallowing  Eyes: Negative for visual disturbance  Respiratory: Negative for cough, chest tightness, shortness of breath and wheezing  Cardiovascular: Negative for chest pain, palpitations and leg swelling  Gastrointestinal: Negative for abdominal distention, abdominal pain, blood in stool, constipation and diarrhea  Endocrine: Negative for polyuria  Genitourinary: Negative for difficulty urinating and flank pain  Musculoskeletal: Negative for arthralgias and myalgias  Skin: Negative for rash  Neurological: Negative for dizziness, weakness and light-headedness  Hematological: Negative for adenopathy  Does not bruise/bleed easily  Psychiatric/Behavioral: Negative for dysphoric mood and sleep disturbance  The patient is not nervous/anxious         Past Medical History:     Past Medical History:   Diagnosis Date    Benign essential hypertension     Broken ribs     Chest pain       Past Surgical History:     Past Surgical History:   Procedure Laterality Date    APPENDECTOMY      LUMBAR LAMINECTOMY Bilateral 04/2018    TONSILLECTOMY        Family History:     Family History   Problem Relation Age of Onset    Colon cancer Mother     Cancer Father     Thyroid cancer Father     Other Father         MOTOR VEHICLE TRAFFIC ACCIDENT    Ovarian cancer Sister       Social History:     Social History     Socioeconomic History    Marital status: /Civil Union     Spouse name: None    Number of children: None    Years of education: None    Highest education level: None   Occupational History    None   Tobacco Use    Smoking status: Former Smoker     Quit date: 1960     Years since quittin 7    Smokeless tobacco: Never Used   Vaping Use    Vaping Use: Never used   Substance and Sexual Activity    Alcohol use: Yes     Comment: Rare    Drug use: Not Currently    Sexual activity: None   Other Topics Concern    None   Social History Narrative    None     Social Determinants of Health     Financial Resource Strain: Not on file   Food Insecurity: Not on file   Transportation Needs: Not on file   Physical Activity: Not on file   Stress: Not on file   Social Connections: Not on file   Intimate Partner Violence: Not on file   Housing Stability: Not on file      Current Medications:     Current Outpatient Medications   Medication Sig Dispense Refill    amLODIPine (NORVASC) 5 mg tablet Take 1 tablet (5 mg total) by mouth daily 90 tablet 3    ascorbic acid (VITAMIN C) 1000 MG tablet Take 1,000 mg by mouth      cetirizine (ZyrTEC) 10 mg tablet Take 1 tablet by mouth      Cholecalciferol (Vitamin D3) 50 MCG (2000 UT) capsule Take 1 capsule (2,000 Units total) by mouth daily  0    DAILY MULTIPLE VITAMINS tablet Take 1 tablet by mouth daily      Diclofenac Sodium (VOLTAREN) 1 % Apply 2 g topically 4 (four) times a day 2 g 2    fluticasone (FLONASE) 50 mcg/act nasal spray SHAKE LIQUID AND USE 2 SPRAYS IN EACH NOSTRIL DAILY 48 g 11    glucosamine-chondroitin 500-400 MG tablet Take 1 tablet by mouth daily      hydrocortisone 2 5 % cream Apply topically 2 (two) times a day as needed for rash 30 g 5    losartan (COZAAR) 100 MG tablet Take 1 tablet (100 mg total) by mouth daily 90 tablet 3    rosuvastatin (CRESTOR) 10 MG tablet Take 1 tablet (10 mg total) by mouth daily 90 tablet 3     No current facility-administered medications for this visit  Allergies: Allergies   Allergen Reactions    Diazepam      Increased anger   Gabapentin Other (See Comments)     Severe mood swings, anger      Physical Exam:     /78 (BP Location: Left arm, Patient Position: Sitting, Cuff Size: Adult)   Pulse 62   Temp (!) 96 8 °F (36 °C) (Tympanic)   Resp 18   Ht 6' 1" (1 854 m)   Wt 90 4 kg (199 lb 6 4 oz)   SpO2 99%   BMI 26 31 kg/m²     Physical Exam  Constitutional:       General: He is not in acute distress  Appearance: Normal appearance  He is well-developed  He is not diaphoretic  HENT:      Head: Normocephalic  Right Ear: External ear normal       Left Ear: External ear normal       Nose: Nose normal    Eyes:      General:         Right eye: No discharge  Left eye: No discharge  Conjunctiva/sclera: Conjunctivae normal       Pupils: Pupils are equal, round, and reactive to light  Neck:      Thyroid: No thyromegaly  Trachea: No tracheal deviation  Cardiovascular:      Rate and Rhythm: Normal rate and regular rhythm  Heart sounds: Normal heart sounds  No murmur heard  No friction rub  Pulmonary:      Effort: Pulmonary effort is normal  No respiratory distress  Breath sounds: Normal breath sounds  No wheezing  Chest:      Chest wall: No tenderness  Abdominal:      General: There is no distension  Palpations: There is no mass  Tenderness: There is no abdominal tenderness  There is no guarding or rebound  Hernia: No hernia is present  Musculoskeletal:         General: No swelling or deformity  Cervical back: Normal range of motion  Right lower leg: No edema  Left lower leg: No edema  Skin:     Findings: No erythema or rash  Neurological:      General: No focal deficit present  Mental Status: He is alert  Cranial Nerves: No cranial nerve deficit        Coordination: Coordination normal    Psychiatric:         Thought Content: Thought content normal           Dutch Tolliver MD  [facility] [address] [facility]  Answers for HPI/ROS submitted by the patient on 9/26/2022  How would you rate your overall health?: very good  Compared to last year, how is your physical health?: same  In general, how satisfied are you with your life?: satisfied  Compared to last year, how is your eyesight?: same  Compared to last year, how is your hearing?: same  Compared to last year, how is your emotional/mental health?: same  How often is anger a problem for you?: sometimes  How often do you feel unusually tired/fatigued?: sometimes  In the past 7 days, how much pain have you experienced?: some  If you answered "some" or "a lot", please rate the severity of your pain on a scale of 1 to 10 (1 being the least severe pain and 10 being the most intense pain)  : 5/10  In the past 6 months, have you lost or gained 10 pounds without trying?: Yes  One or more falls in the last year: Yes  Do you have trouble with the stairs inside or outside your home?: No  Does your home have working smoke alarms?: Yes  Does your home have a carbon monoxide monitor?: No  Which safety hazards (if any) have you experienced in your home? Please select all that apply : household clutter  How would you describe your current diet?  Please select all that apply : Regular  In addition to prescription medications, are you taking any over-the-counter supplements?: Yes  If yes, what supplements are you taking?: Glucosamine, Vitamins  Can you manage your medications?: Yes  Are you currently taking any opioid medications?: No  Can you walk and transfer into and out of your bed and chair?: Yes  Can you dress and groom yourself?: Yes  Can you bathe or shower yourself?: Yes  Can you feed yourself?: Yes  Can you do your laundry/ housekeeping?: Yes  Can you manage your money, pay your bills, and track your expenses?: Yes  Can you make your own meals?: Yes  Can you do your own shopping?: Yes  Within the last 12 months, have you had any hospitalizations or Emergency Department visits?: No  Do you have a living will?: Yes  Do you have a Durable POA (Power of ) for healthcare decisions?: Yes  Do you have an Advanced Directive for end of life decisions?: Yes  How often have you used an illegal drug (including marijuana) or a prescription medication for non-medical reasons in the past year?: never  What is the typical number of drinks you consume in a day?: 0  What is the typical number of drinks you consume in a week?: 0  How often did you have a drink containing alcohol in the past year?: monthly or less  How many drinks did you have on a typical day  when you were drinking in the past year?: 1 to 2  How often did you have 6 or more drinks on one occasion in the past year?: never

## 2022-10-27 ENCOUNTER — VBI (OUTPATIENT)
Dept: ADMINISTRATIVE | Facility: OTHER | Age: 77
End: 2022-10-27

## 2022-10-27 NOTE — TELEPHONE ENCOUNTER
10/27/22 8:29 AM     See documentation in the [employer] SmartForm       [facility]

## 2022-12-05 ENCOUNTER — APPOINTMENT (EMERGENCY)
Dept: CT IMAGING | Facility: HOSPITAL | Age: 77
End: 2022-12-05

## 2022-12-05 ENCOUNTER — APPOINTMENT (OUTPATIENT)
Dept: RADIOLOGY | Facility: HOSPITAL | Age: 77
End: 2022-12-05

## 2022-12-05 ENCOUNTER — HOSPITAL ENCOUNTER (OUTPATIENT)
Facility: HOSPITAL | Age: 77
Setting detail: OBSERVATION
Discharge: HOME | End: 2022-12-06
Attending: EMERGENCY MEDICINE | Admitting: SURGERY

## 2022-12-05 DIAGNOSIS — R55 SYNCOPE AND COLLAPSE: Primary | ICD-10-CM

## 2022-12-05 PROBLEM — N17.9 AKI (ACUTE KIDNEY INJURY) (HCC): Status: ACTIVE | Noted: 2022-12-05

## 2022-12-05 PROBLEM — S01.01XA SCALP LACERATION, INITIAL ENCOUNTER: Status: ACTIVE | Noted: 2022-12-05

## 2022-12-05 LAB
2HR DELTA HS TROPONIN: 7 NG/L
4HR DELTA HS TROPONIN: 14 NG/L
ALBUMIN SERPL BCP-MCNC: 3.8 G/DL (ref 3.5–5)
ALP SERPL-CCNC: 54 U/L (ref 34–104)
ALT SERPL W P-5'-P-CCNC: 14 U/L (ref 7–52)
ANION GAP SERPL CALCULATED.3IONS-SCNC: 11 MMOL/L (ref 4–13)
AST SERPL W P-5'-P-CCNC: 21 U/L (ref 13–39)
ATRIAL RATE: 83 BPM
BASOPHILS # BLD AUTO: 0.07 THOUSANDS/ÂΜL (ref 0–0.1)
BASOPHILS NFR BLD AUTO: 1 % (ref 0–1)
BILIRUB SERPL-MCNC: 1.29 MG/DL (ref 0.2–1)
BUN SERPL-MCNC: 20 MG/DL (ref 5–25)
CALCIUM SERPL-MCNC: 9.3 MG/DL (ref 8.4–10.2)
CARDIAC TROPONIN I PNL SERPL HS: 38 NG/L
CARDIAC TROPONIN I PNL SERPL HS: 45 NG/L
CARDIAC TROPONIN I PNL SERPL HS: 52 NG/L
CHLORIDE SERPL-SCNC: 105 MMOL/L (ref 96–108)
CO2 SERPL-SCNC: 23 MMOL/L (ref 21–32)
CREAT SERPL-MCNC: 1.56 MG/DL (ref 0.6–1.3)
D DIMER PPP FEU-MCNC: 0.63 UG/ML FEU
EOSINOPHIL # BLD AUTO: 0.25 THOUSAND/ÂΜL (ref 0–0.61)
EOSINOPHIL NFR BLD AUTO: 4 % (ref 0–6)
ERYTHROCYTE [DISTWIDTH] IN BLOOD BY AUTOMATED COUNT: 13.3 % (ref 11.6–15.1)
GFR SERPL CREATININE-BSD FRML MDRD: 42 ML/MIN/1.73SQ M
GLUCOSE SERPL-MCNC: 164 MG/DL (ref 65–140)
HCT VFR BLD AUTO: 44.4 % (ref 36.5–49.3)
HGB BLD-MCNC: 14.7 G/DL (ref 12–17)
IMM GRANULOCYTES # BLD AUTO: 0.02 THOUSAND/UL (ref 0–0.2)
IMM GRANULOCYTES NFR BLD AUTO: 0 % (ref 0–2)
LYMPHOCYTES # BLD AUTO: 1.44 THOUSANDS/ÂΜL (ref 0.6–4.47)
LYMPHOCYTES NFR BLD AUTO: 21 % (ref 14–44)
MAGNESIUM SERPL-MCNC: 1.9 MG/DL (ref 1.9–2.7)
MCH RBC QN AUTO: 31.3 PG (ref 26.8–34.3)
MCHC RBC AUTO-ENTMCNC: 33.1 G/DL (ref 31.4–37.4)
MCV RBC AUTO: 95 FL (ref 82–98)
MONOCYTES # BLD AUTO: 0.5 THOUSAND/ÂΜL (ref 0.17–1.22)
MONOCYTES NFR BLD AUTO: 7 % (ref 4–12)
NEUTROPHILS # BLD AUTO: 4.55 THOUSANDS/ÂΜL (ref 1.85–7.62)
NEUTS SEG NFR BLD AUTO: 67 % (ref 43–75)
NRBC BLD AUTO-RTO: 0 /100 WBCS
P AXIS: 53 DEGREES
PLATELET # BLD AUTO: 239 THOUSANDS/UL (ref 149–390)
PLATELET # BLD AUTO: 308 THOUSANDS/UL (ref 149–390)
PMV BLD AUTO: 10.5 FL (ref 8.9–12.7)
PMV BLD AUTO: 10.9 FL (ref 8.9–12.7)
POTASSIUM SERPL-SCNC: 4.3 MMOL/L (ref 3.5–5.3)
PR INTERVAL: 220 MS
PROT SERPL-MCNC: 6.3 G/DL (ref 6.4–8.4)
QRS AXIS: 16 DEGREES
QRSD INTERVAL: 86 MS
QT INTERVAL: 364 MS
QTC INTERVAL: 427 MS
RBC # BLD AUTO: 4.7 MILLION/UL (ref 3.88–5.62)
SODIUM SERPL-SCNC: 139 MMOL/L (ref 135–147)
T WAVE AXIS: 60 DEGREES
T4 FREE SERPL-MCNC: 0.97 NG/DL (ref 0.76–1.46)
TSH SERPL DL<=0.05 MIU/L-ACNC: 6.57 UIU/ML (ref 0.45–4.5)
VENTRICULAR RATE: 83 BPM
WBC # BLD AUTO: 6.83 THOUSAND/UL (ref 4.31–10.16)

## 2022-12-05 RX ORDER — ASCORBIC ACID 500 MG
1000 TABLET ORAL DAILY
Status: DISCONTINUED | OUTPATIENT
Start: 2022-12-05 | End: 2022-12-06 | Stop reason: HOSPADM

## 2022-12-05 RX ORDER — HYDROMORPHONE HCL/PF 1 MG/ML
0.5 SYRINGE (ML) INJECTION
Status: DISCONTINUED | OUTPATIENT
Start: 2022-12-05 | End: 2022-12-06 | Stop reason: HOSPADM

## 2022-12-05 RX ORDER — ACETAMINOPHEN 325 MG/1
650 TABLET ORAL EVERY 4 HOURS PRN
Status: DISCONTINUED | OUTPATIENT
Start: 2022-12-05 | End: 2022-12-06 | Stop reason: HOSPADM

## 2022-12-05 RX ORDER — LOSARTAN POTASSIUM 50 MG/1
100 TABLET ORAL DAILY
Status: DISCONTINUED | OUTPATIENT
Start: 2022-12-05 | End: 2022-12-06 | Stop reason: HOSPADM

## 2022-12-05 RX ORDER — MELATONIN
2000 DAILY
Status: DISCONTINUED | OUTPATIENT
Start: 2022-12-05 | End: 2022-12-06 | Stop reason: HOSPADM

## 2022-12-05 RX ORDER — MELATONIN
1000 DAILY
Status: DISCONTINUED | OUTPATIENT
Start: 2022-12-05 | End: 2022-12-05

## 2022-12-05 RX ORDER — OXYCODONE HYDROCHLORIDE 10 MG/1
10 TABLET ORAL EVERY 6 HOURS PRN
Status: DISCONTINUED | OUTPATIENT
Start: 2022-12-05 | End: 2022-12-06 | Stop reason: HOSPADM

## 2022-12-05 RX ORDER — ACETAMINOPHEN 325 MG/1
650 TABLET ORAL ONCE
Status: COMPLETED | OUTPATIENT
Start: 2022-12-05 | End: 2022-12-05

## 2022-12-05 RX ORDER — PRAVASTATIN SODIUM 80 MG/1
80 TABLET ORAL
Status: DISCONTINUED | OUTPATIENT
Start: 2022-12-05 | End: 2022-12-06 | Stop reason: HOSPADM

## 2022-12-05 RX ORDER — AMLODIPINE BESYLATE 5 MG/1
5 TABLET ORAL DAILY
Status: DISCONTINUED | OUTPATIENT
Start: 2022-12-05 | End: 2022-12-06 | Stop reason: HOSPADM

## 2022-12-05 RX ORDER — POLYETHYLENE GLYCOL 3350 17 G/17G
17 POWDER, FOR SOLUTION ORAL DAILY PRN
Status: DISCONTINUED | OUTPATIENT
Start: 2022-12-05 | End: 2022-12-06 | Stop reason: HOSPADM

## 2022-12-05 RX ORDER — ENOXAPARIN SODIUM 100 MG/ML
30 INJECTION SUBCUTANEOUS EVERY 12 HOURS SCHEDULED
Status: DISCONTINUED | OUTPATIENT
Start: 2022-12-05 | End: 2022-12-06 | Stop reason: HOSPADM

## 2022-12-05 RX ORDER — OXYCODONE HYDROCHLORIDE 5 MG/1
5 TABLET ORAL EVERY 6 HOURS PRN
Status: DISCONTINUED | OUTPATIENT
Start: 2022-12-05 | End: 2022-12-06 | Stop reason: HOSPADM

## 2022-12-05 RX ORDER — SODIUM CHLORIDE, SODIUM GLUCONATE, SODIUM ACETATE, POTASSIUM CHLORIDE, MAGNESIUM CHLORIDE, SODIUM PHOSPHATE, DIBASIC, AND POTASSIUM PHOSPHATE .53; .5; .37; .037; .03; .012; .00082 G/100ML; G/100ML; G/100ML; G/100ML; G/100ML; G/100ML; G/100ML
500 INJECTION, SOLUTION INTRAVENOUS ONCE
Status: COMPLETED | OUTPATIENT
Start: 2022-12-05 | End: 2022-12-05

## 2022-12-05 RX ORDER — ONDANSETRON 2 MG/ML
4 INJECTION INTRAMUSCULAR; INTRAVENOUS EVERY 6 HOURS PRN
Status: DISCONTINUED | OUTPATIENT
Start: 2022-12-05 | End: 2022-12-06 | Stop reason: HOSPADM

## 2022-12-05 RX ADMIN — SODIUM CHLORIDE 1000 ML: 0.9 INJECTION, SOLUTION INTRAVENOUS at 08:06

## 2022-12-05 RX ADMIN — SODIUM CHLORIDE, SODIUM GLUCONATE, SODIUM ACETATE, POTASSIUM CHLORIDE, MAGNESIUM CHLORIDE, SODIUM PHOSPHATE, DIBASIC, AND POTASSIUM PHOSPHATE 500 ML: .53; .5; .37; .037; .03; .012; .00082 INJECTION, SOLUTION INTRAVENOUS at 14:43

## 2022-12-05 RX ADMIN — ENOXAPARIN SODIUM 30 MG: 30 INJECTION SUBCUTANEOUS at 21:04

## 2022-12-05 RX ADMIN — PRAVASTATIN SODIUM 80 MG: 80 TABLET ORAL at 18:20

## 2022-12-05 RX ADMIN — TETANUS TOXOID, REDUCED DIPHTHERIA TOXOID AND ACELLULAR PERTUSSIS VACCINE, ADSORBED 0.5 ML: 5; 2.5; 8; 8; 2.5 SUSPENSION INTRAMUSCULAR at 15:19

## 2022-12-05 RX ADMIN — ACETAMINOPHEN 650 MG: 325 TABLET ORAL at 08:04

## 2022-12-05 NOTE — ASSESSMENT & PLAN NOTE
- Patient with syncope and collapse  - Head CT negative for traumatic injury  - Troponins 38/45/52, ECG with normal sinus rhythm  - Cardiology consulted, appreciate input  - Continue on telemetry at this time  - Follow-up echocardiogram

## 2022-12-05 NOTE — ED PROVIDER NOTES
History  Chief Complaint   Patient presents with   • Syncope     Pt here by ems from [facility]  Patient was swimming in a warm pool this morning then took a hot shower and "woke up with people around him" does not remember falling   +headstrike w/ lac to back to head -thinners - asa      Patient is a 68-year-old male presents to the emergency department via EMS from [facility]  He had gone swimming this morning and was subsequently showering when he passed out  He felt well while swimming at the beginning of shower  As he was shampooing is here he began feeling dizzy  This was clarified to be lightheaded sensation  There was no spinning/vertiginous component  He then recalls waking on the ground with people around him  He had not initially realized he hit his head but became aware that it was bleeding and has now developed some head discomfort  He was quite nauseous initially  Nausea has resolved  He does report some discomfort in both feet following event  He relates that these are cold which is not unusual   He denies having appreciated any abnormal sensation in the chest preceding or following the event such as palpitations, discomfort or dyspnea  He denies any recent change in activity or health  No recent fevers, decrease in oral intake  Past medical history significant for hypertension and BPH  He did have a couple of episodes of syncope in past including 1 while shoveling snow  He describes having undergone extensive testing and ultimately the event having been attributed to dehydration  Tetanus vaccine was last received in 2014  Prior to Admission Medications   Prescriptions Last Dose Informant Patient Reported? Taking?    Cholecalciferol (Vitamin D3) 50 MCG (2000 UT) capsule   No No   Sig: Take 1 capsule (2,000 Units total) by mouth daily   DAILY MULTIPLE VITAMINS tablet   Yes No   Sig: Take 1 tablet by mouth daily   Diclofenac Sodium (VOLTAREN) 1 % No No   Sig: Apply 2 g topically 4 (four) times a day   amLODIPine (NORVASC) 5 mg tablet   No No   Sig: Take 1 tablet (5 mg total) by mouth daily   ascorbic acid (VITAMIN C) 1000 MG tablet   Yes No   Sig: Take 1,000 mg by mouth   cetirizine (ZyrTEC) 10 mg tablet   Yes No   Sig: Take 1 tablet by mouth   fluticasone (FLONASE) 50 mcg/act nasal spray   No No   Sig: SHAKE LIQUID AND USE 2 SPRAYS IN EACH NOSTRIL DAILY   glucosamine-chondroitin 500-400 MG tablet   Yes No   Sig: Take 1 tablet by mouth daily   hydrocortisone 2 5 % cream   No No   Sig: Apply topically 2 (two) times a day as needed for rash   losartan (COZAAR) 100 MG tablet   No No   Sig: Take 1 tablet (100 mg total) by mouth daily   rosuvastatin (CRESTOR) 10 MG tablet   No No   Sig: Take 1 tablet (10 mg total) by mouth daily      Facility-Administered Medications: None       Past Medical History:   Diagnosis Date   • Benign essential hypertension    • Broken ribs    • Chest pain        Past Surgical History:   Procedure Laterality Date   • APPENDECTOMY     • LUMBAR LAMINECTOMY Bilateral 2018   • TONSILLECTOMY         Family History   Problem Relation Age of Onset   • Colon cancer Mother    • Cancer Father    • Thyroid cancer Father    • Other Father         MOTOR VEHICLE TRAFFIC ACCIDENT   • Ovarian cancer Sister      I have reviewed and agree with the history as documented  E-Cigarette/Vaping   • E-Cigarette Use Never User      E-Cigarette/Vaping Substances   • Nicotine No      Social History     Tobacco Use   • Smoking status: Former     Types: Cigarettes     Quit date: 1960     Years since quittin 9   • Smokeless tobacco: Never   Vaping Use   • Vaping Use: Never used   Substance Use Topics   • Alcohol use: Yes     Comment: Rare   • Drug use: Not Currently       Review of Systems   Constitutional: Negative for diaphoresis, fatigue and fever  Respiratory: Negative for chest tightness and shortness of breath      All other systems reviewed and are negative  Physical Exam  Physical Exam  Vitals and nursing note reviewed  Constitutional:       Appearance: Normal appearance  Comments: Resting still with cervical collar in position  Speech is clear  HENT:      Head: Normocephalic  Comments: Y shaped posterior scalp laceration 5 cm  No active bleeding     Mouth/Throat:      Mouth: Mucous membranes are moist    Eyes:      General: No scleral icterus  Extraocular Movements: Extraocular movements intact  Conjunctiva/sclera: Conjunctivae normal       Pupils: Pupils are equal, round, and reactive to light  Cardiovascular:      Rate and Rhythm: Normal rate and regular rhythm  Heart sounds: Normal heart sounds  Pulmonary:      Effort: Pulmonary effort is normal       Breath sounds: Normal breath sounds  Abdominal:      General: Bowel sounds are normal       Palpations: Abdomen is soft  Musculoskeletal:         General: Normal range of motion  Cervical back: Neck supple  No tenderness  Right lower leg: No edema  Left lower leg: No edema  Comments: No midline cervical, thoracic or lumbar tenderness  Feet cool & mottled (chronic per pt)  +1 pt and dp pulses  Sensation and motor intact  No splinter hemorrhages  Following warmth with blankets exam unchanged  Skin:     General: Skin is warm and dry  Neurological:      General: No focal deficit present  Mental Status: He is alert and oriented to person, place, and time  Comments: Clear speech  No facial asymmetry/ deficit appreciated  Pupils briskly reactive to light  EOMI  No nystagmus  Strong eye closure & symmetric brow raise  Ability to insufflate cheeks, protrude tongue midline & range this laterally  Symmetric/ intact sensation in the upper, mid & lower portions of the face    5/5 strength on head turn, shoulder shrug, bicep, tricep & deltoid movement against resistance b/l   5/5 strength on b/l hand , pincer grasp, finger abduction, dorsi & volar flexion, hip flexion, dorsi & plantar flexion  Symmetric grossly intact sensation in the UE & LE   Gait deferred         Psychiatric:         Behavior: Behavior normal          Vital Signs  ED Triage Vitals [12/05/22 0721]   Temperature Pulse Respirations Blood Pressure SpO2   97 7 °F (36 5 °C) 84 16 124/92 99 %      Temp src Heart Rate Source Patient Position - Orthostatic VS BP Location FiO2 (%)   -- Monitor Lying Right arm --      Pain Score       3           Vitals:    12/05/22 1830 12/05/22 1930 12/05/22 2057 12/05/22 2234   BP: 144/75 139/66 149/95 139/91   Pulse: 60 65 72 61   Patient Position - Orthostatic VS: Sitting Sitting           Visual Acuity  Visual Acuity    Flowsheet Row Most Recent Value   L Pupil Size (mm) 3   R Pupil Size (mm) 3          ED Medications  Medications   enoxaparin (LOVENOX) subcutaneous injection 30 mg (30 mg Subcutaneous Given 12/5/22 2104)   naloxone (NARCAN) 0 04 mg/mL syringe 0 04 mg (has no administration in time range)   acetaminophen (TYLENOL) tablet 650 mg (has no administration in time range)   oxyCODONE (ROXICODONE) IR tablet 5 mg (has no administration in time range)   HYDROmorphone (DILAUDID) injection 0 5 mg (has no administration in time range)   oxyCODONE (ROXICODONE) immediate release tablet 10 mg (has no administration in time range)   amLODIPine (NORVASC) tablet 5 mg (5 mg Oral Not Given 12/5/22 1431)   ascorbic acid (VITAMIN C) tablet 1,000 mg (1,000 mg Oral Not Given 12/5/22 1431)   multivitamin-minerals (CENTRUM) tablet 1 tablet (1 tablet Oral Not Given 12/5/22 1436)   losartan (COZAAR) tablet 100 mg (100 mg Oral Not Given 12/5/22 1432)   pravastatin (PRAVACHOL) tablet 80 mg (80 mg Oral Given 12/5/22 1820)   cholecalciferol (VITAMIN D3) tablet 2,000 Units (2,000 Units Oral Not Given 12/5/22 1432)   ondansetron (ZOFRAN) injection 4 mg (has no administration in time range)   polyethylene glycol (MIRALAX) packet 17 g (has no administration in time range) acetaminophen (TYLENOL) tablet 650 mg (650 mg Oral Given 12/5/22 0804)   sodium chloride 0 9 % bolus 1,000 mL (0 mL Intravenous Stopped 12/5/22 1300)   multi-electrolyte (ISOLYTE-S PH 7 4) bolus 500 mL (500 mL Intravenous New Bag 12/5/22 1443)   tetanus-diphtheria-acellular pertussis (BOOSTRIX) IM injection 0 5 mL (0 5 mL Intramuscular Given 12/5/22 1519)       Diagnostic Studies  Results Reviewed     Procedure Component Value Units Date/Time    T4, free [[id number]]  (Normal) Collected: 12/05/22 0739    Lab Status: Final result Specimen: Blood from Arm, Left Updated: 12/05/22 2127     Free T4 0 97 ng/dL     TSH, 3rd generation with Free T4 reflex [[id number]]  (Abnormal) Collected: 12/05/22 0739    Lab Status: Final result Specimen: Blood from Arm, Left Updated: 12/05/22 1616     TSH 3RD GENERATON 6 571 uIU/mL     Narrative:      Patients undergoing fluorescein dye angiography may retain small amounts of fluorescein in the body for 48-72 hours post procedure  Samples containing fluorescein can produce falsely depressed TSH values  If the patient had this procedure,a specimen should be resubmitted post fluorescein clearance        Platelet count [[id number]]  (Normal) Collected: 12/05/22 1444    Lab Status: Final result Specimen: Blood from Line, Venous Updated: 12/05/22 1458     Platelets 276 Thousands/uL      MPV 10 5 fL     Magnesium [[id number]]  (Normal) Collected: 12/05/22 0739    Lab Status: Final result Specimen: Blood from Arm, Left Updated: 12/05/22 1450     Magnesium 1 9 mg/dL     HS Troponin I 4hr [[id number]]  (Abnormal) Collected: 12/05/22 1139    Lab Status: Final result Specimen: Blood from Line, Venous Updated: 12/05/22 1220     hs TnI 4hr 52 ng/L      Delta 4hr hsTnI 14 ng/L     HS Troponin I 2hr [[id number]]  (Normal) Collected: 12/05/22 0951    Lab Status: Final result Specimen: Blood from Arm, Left Updated: 12/05/22 1025     hs TnI 2hr 45 ng/L      Delta 2hr hsTnI 7 ng/L     D-Dimer [[id number]] (Abnormal) Collected: 12/05/22 0739    Lab Status: Final result Specimen: Blood from Arm, Left Updated: 12/05/22 0843     D-Dimer, Quant 0 63 ug/ml FEU     Narrative: In the evaluation for possible pulmonary embolism, in the appropriate (Well's Score of 4 or less) patient, the age adjusted d-dimer cutoff for this patient can be calculated as:    Age x 0 01 (in ug/mL) for Age-adjusted D-dimer exclusion threshold for a patient over 50 years      HS Troponin 0hr (reflex protocol) [[id number]]  (Normal) Collected: 12/05/22 0739    Lab Status: Final result Specimen: Blood from Arm, Left Updated: 12/05/22 0809     hs TnI 0hr 38 ng/L     CBC and differential [[id number]] Collected: 12/05/22 0739    Lab Status: Final result Specimen: Blood from Arm, Left Updated: 12/05/22 0804     WBC 6 83 Thousand/uL      RBC 4 70 Million/uL      Hemoglobin 14 7 g/dL      Hematocrit 44 4 %      MCV 95 fL      MCH 31 3 pg      MCHC 33 1 g/dL      RDW 13 3 %      MPV 10 9 fL      Platelets 847 Thousands/uL      nRBC 0 /100 WBCs      Neutrophils Relative 67 %      Immat GRANS % 0 %      Lymphocytes Relative 21 %      Monocytes Relative 7 %      Eosinophils Relative 4 %      Basophils Relative 1 %      Neutrophils Absolute 4 55 Thousands/µL      Immature Grans Absolute 0 02 Thousand/uL      Lymphocytes Absolute 1 44 Thousands/µL      Monocytes Absolute 0 50 Thousand/µL      Eosinophils Absolute 0 25 Thousand/µL      Basophils Absolute 0 07 Thousands/µL     Comprehensive metabolic panel [[id number]]  (Abnormal) Collected: 12/05/22 0739    Lab Status: Final result Specimen: Blood from Arm, Left Updated: 12/05/22 0801     Sodium 139 mmol/L      Potassium 4 3 mmol/L      Chloride 105 mmol/L      CO2 23 mmol/L      ANION GAP 11 mmol/L      BUN 20 mg/dL      Creatinine 1 56 mg/dL      Glucose 164 mg/dL      Calcium 9 3 mg/dL      AST 21 U/L      ALT 14 U/L      Alkaline Phosphatase 54 U/L      Total Protein 6 3 g/dL      Albumin 3 8 g/dL      Total Bilirubin 1 29 mg/dL      eGFR 42 ml/min/1 73sq m     Narrative:      [place] guidelines for Chronic Kidney Disease (CKD):   •  Stage 1 with normal or high GFR (GFR > 90 mL/min/1 73 square meters)  •  Stage 2 Mild CKD (GFR = 60-89 mL/min/1 73 square meters)  •  Stage 3A Moderate CKD (GFR = 45-59 mL/min/1 73 square meters)  •  Stage 3B Moderate CKD (GFR = 30-44 mL/min/1 73 square meters)  •  Stage 4 Severe CKD (GFR = 15-29 mL/min/1 73 square meters)  •  Stage 5 End Stage CKD (GFR <15 mL/min/1 73 square meters)  Note: GFR calculation is accurate only with a steady state creatinine                 XR chest portable   Final Result by Clint Pena MD (12/05 1852)      No acute cardiopulmonary disease  Workstation performed: [id number]         CT head without contrast   Final Result by Nurys Nieves MD (12/05 1373)      No acute intracranial abnormality  Workstation performed: [id number]         CT cervical spine without contrast   Final Result by Nurys Nieves MD (12/05 5461)      No cervical spine fracture or traumatic malalignment                     Workstation performed: [id number]                    Procedures  ECG 12 Lead Documentation Only    Date/Time: 12/5/2022 8:19 AM  Performed by: Bong Jewell MD  Authorized by: Bong Jewell MD     ECG reviewed by me, the ED Provider: yes    Patient location:  ED  Previous ECG:     Previous ECG:  Compared to current    Comparison ECG info:  March 4, 2019-similar morphology  Rate:     ECG rate:  83    ECG rate assessment: normal    Rhythm:     Rhythm: sinus rhythm    Ectopy:     Ectopy: none    QRS:     QRS axis:  Normal    QRS intervals:  Normal  Conduction:     Conduction: normal    ST segments:     ST segments:  Normal  T waves:     T waves: peaked      Peaked:  II, V2, V3, V4 and V5    Laceration repair    Date/Time: 12/5/2022 10:20 AM  Performed by: Matthew Jones Ghislaine Koehler MD  Authorized by: Johnson Mancuso MD   Consent: Verbal consent obtained  Body area: head/neck  Location details: scalp  Laceration length: 5 cm  Foreign bodies: no foreign bodies      Procedure Details:  Irrigation solution: saline  Irrigation method: syringe  Amount of cleaning: standard  Skin closure: glue               ED Course  ED Course as of 12/05/22 2324   Mon Dec 05, 2022   0816 Patient here following syncope during warm shower  History of 2 prior episodes of syncope which occurred in the setting of dehydration  Creatinine is mildly elevated today  Will hydrate  Obtaining CT scans to assess for injury and cold at additional labs in consideration of etiology  5848 Patient at very low risk for DVT/PE  D-dimer is negative adjusted for age  1029 Scalp lac closed  Pt  Feeling well aside from discomfort at site from irrigation/ gluing  2nd troponin in process  Will ambulate if not elevating  Suspect BP dipped in hot shower while slight dry (no PO fluids today, mild Cr bump)  Had in-hospital eval for syncope 3 y ago  Prefers DC if no new symptoms  1152 Meal has arrived for patient  Third troponin has been ordered  Second was slightly higher than 1st   Patient remains preferring discharge if possible  Will ambulate following meal   Decision for final disposition following 3rd troponin, ambulation attempt and reassessment  1251 Patient felt well following meal and upon ambulation  His troponin however continues to rise  I expressed my concern regarding this and he is agreeable to further hospital evaluation  Given presence of scalp laceration I did touch base with trauma attending Sigifredo Gramajo    Contacted resident Glynn Cavazos' Criteria for PE    Flowsheet Row Most Recent Value   Marlon' Criteria for PE    Clinical signs and symptoms of DVT 0 Filed at: 12/05/2022 9117   PE is primary diagnosis or equally likely 0 Filed at: 12/05/2022 0810   HR >100 0 Filed at: 12/05/2022 0810   Immobilization at least 3 days or Surgery in the previous 4 weeks 0 Filed at: 12/05/2022 8517   Previous, objectively diagnosed PE or DVT 0 Filed at: 12/05/2022 0810   Hemoptysis 0 Filed at: 12/05/2022 7091   Malignancy with treatment within 6 months or palliative 0 Filed at: 12/05/2022 6852   Wells' Criteria Total 0 Filed at: 12/05/2022 0810                MDM    Disposition  Final diagnoses:   Syncope and collapse     Time reflects when diagnosis was documented in both MDM as applicable and the Disposition within this note     Time User Action Codes Description Comment    12/5/2022  2:11 PM Yoselin Flowers Add [R55] Syncope and collapse       ED Disposition     None      Follow-up Information    None         Current Discharge Medication List      CONTINUE these medications which have NOT CHANGED    Details   amLODIPine (NORVASC) 5 mg tablet Take 1 tablet (5 mg total) by mouth daily  Qty: 90 tablet, Refills: 3    Associated Diagnoses: Hypertension, unspecified type      ascorbic acid (VITAMIN C) 1000 MG tablet Take 1,000 mg by mouth      cetirizine (ZyrTEC) 10 mg tablet Take 1 tablet by mouth      Cholecalciferol (Vitamin D3) 50 MCG (2000 UT) capsule Take 1 capsule (2,000 Units total) by mouth daily  Refills: 0    Associated Diagnoses: Vitamin D deficiency      DAILY MULTIPLE VITAMINS tablet Take 1 tablet by mouth daily      Diclofenac Sodium (VOLTAREN) 1 % Apply 2 g topically 4 (four) times a day  Qty: 2 g, Refills: 2    Associated Diagnoses: Slac (scapholunate advanced collapse) of wrist, left; Arthritis of carpometacarpal (CMC) joint of left thumb      fluticasone (FLONASE) 50 mcg/act nasal spray SHAKE LIQUID AND USE 2 SPRAYS IN EACH NOSTRIL DAILY  Qty: 48 g, Refills: 11    Associated Diagnoses:  Allergic rhinitis, unspecified seasonality, unspecified trigger      glucosamine-chondroitin 500-400 MG tablet Take 1 tablet by mouth daily      hydrocortisone 2 5 % cream Apply topically 2 (two) times a day as needed for rash  Qty: 30 g, Refills: 5    Associated Diagnoses: Intrinsic eczema      losartan (COZAAR) 100 MG tablet Take 1 tablet (100 mg total) by mouth daily  Qty: 90 tablet, Refills: 3    Associated Diagnoses: Benign essential HTN      rosuvastatin (CRESTOR) 10 MG tablet Take 1 tablet (10 mg total) by mouth daily  Qty: 90 tablet, Refills: 3    Associated Diagnoses: Hypercholesterolemia             No discharge procedures on file      PDMP Review     None          ED Provider  Electronically Signed by           Candelaria Hawkins MD  12/05/22 9090

## 2022-12-05 NOTE — H&P
H&P - Trauma   Mariam Tesfaye 68 y o  male MRN: [id number]  Unit/Bed#: ED-41 Encounter: [id number]    Trauma Alert: Evaluation; trauma team notified at 1311 via text   Model of Arrival: Self    Trauma Team: Attending [facility] AP [address] [place] [address]  Consultants:     Other: {routine consult; Epic consult order placed; Cardiology    Assessment/Plan   Active Problems / Assessment:   Syncope with collapse  Scalp laceration  Elevated troponin  MIAN     Plan:   Troponin, CBC, CMP, TSH with free T4 reflex, magnesium, D-dimer, ECG, chest x-ray  Head CT, cervical spine CT, Isolyte, a m  CBC and BMP, cardiology consult, telemetry, orthostatic blood pressures, PT/OT evaluation and treatment    History of Present Illness     Chief Complaint:  Ground level fall with head strike; scalp laceration  Mechanism:Fall     HPI:    Mariam Tesfaye is a 68 y o  male with history of hypercholesteremia, hypertension, CKD, that presents to the trauma service as a consult for ground level fall; syncope collapse  Patient reports that he went swimming at a local community pool and after swimming 48 laps, he had a syncopal episode in the shower with headstrike  Patient affirms loss of consciousness, "the next thing I remember people were standing over me "  Patient scalp laceration was closed via surgical glue by ED physician  Patient head CT and cervical spine CT were negative for traumatic injury  Patient denies pain at this time  Patient denies history of concussion, seizure and denies history of stroke  Patient also reports that “3-4 years ago when I was shoveling my Dr Moshe Roberts had the same thing happened to me, they told me it was dehydration "  Patient also reports that he had not eaten breakfast this morning nor had anything to drink prior to swimming  Patient is neurovascularly intact with no focal neurological deficits at this time  Patient denies AC/AP    Patient reports taking all prescribed medication daily and denies recent ETOH or illicit drug use   Patient denies nausea, vomiting and diarrhea symptoms  Patient denies chest pain, shortness of breath, and abdominal pain  Review of Systems   Constitutional: Negative for activity change, appetite change, fatigue and fever  HENT: Negative for ear pain, nosebleeds, sore throat and tinnitus  Eyes: Negative for photophobia and visual disturbance  Respiratory: Negative for cough, chest tightness, shortness of breath, wheezing and stridor  Cardiovascular: Negative for chest pain and palpitations  Gastrointestinal: Negative for constipation, diarrhea, nausea and vomiting  Genitourinary: Negative for flank pain and hematuria  Musculoskeletal: Negative for back pain, neck pain and neck stiffness  Skin: Positive for wound  Neurological: Positive for syncope  Negative for dizziness, seizures and numbness  Psychiatric/Behavioral: Negative for agitation and confusion  All other systems reviewed and are negative  12-point, complete review of systems was reviewed and negative except as stated above       Historical Information     Past Medical History:   Diagnosis Date   • Benign essential hypertension    • Broken ribs    • Chest pain      Past Surgical History:   Procedure Laterality Date   • APPENDECTOMY     • LUMBAR LAMINECTOMY Bilateral 2018   • TONSILLECTOMY          Social History     Tobacco Use   • Smoking status: Former     Types: Cigarettes     Quit date: 1960     Years since quittin 9   • Smokeless tobacco: Never   Vaping Use   • Vaping Use: Never used   Substance Use Topics   • Alcohol use: Yes     Comment: Rare   • Drug use: Not Currently     Immunization History   Administered Date(s) Administered   • COVID-19 MODERNA VACC 0 25 ML IM BOOSTER 2022   • COVID-19 MODERNA VACC 0 5 ML IM 2021, 2021   • Pneumococcal Conjugate 13-Valent 2018   • Pneumococcal Polysaccharide PPV23 2014   • Tdap 2014   • Varicella 2011     Last Tetanus: 2014  Family History: Non-contributory    1  Before the illness or injury that brought you to the Emergency, did you need someone to help you on a regular basis? 0=No   2  Since the illness or injury that brought you to the Emergency, have you needed more help than usual to take care of yourself? 0=No   3  Have you been hospitalized for one or more nights during the past 6 months (excluding a stay in the Emergency Department)? 0=No   4  In general, do you see well? 0=Yes   5  In general, do you have serious problems with your memory? 0=No   6  Do you take more than three different medications everyday? 1=Yes   TOTAL   1     Did you order a geriatric consult if the score was 2 or greater?: no     Meds/Allergies   all current active meds have been reviewed     Allergies   Allergen Reactions   • Diazepam      Increased anger  • Gabapentin Other (See Comments)     Severe mood swings, anger       Objective   Initial Vitals:   Temperature: 97 7 °F (36 5 °C) (12/05/22 0721)  Pulse: 84 (12/05/22 0721)  Respirations: 16 (12/05/22 0721)  Blood Pressure: 124/92 (12/05/22 0721)    Primary Survey:   Airway:        Status: patent;        Pre-hospital Interventions: none        Hospital Interventions: none  Breathing:        Pre-hospital Interventions: none       Effort: normal       Right breath sounds: normal       Left breath sounds: normal  Circulation:        Rhythm: regular       Rate: regular   Right Pulses Left Pulses    R radial: 2+    R pedal: 2+     L radial: 2+    L pedal: 2+       Disability:        GCS: Eye: 4; Verbal: 5 Motor: 6 Total: 15       Right Pupil: 3 mm;  round;  reactive         Left Pupil:  3 mm;  round;  reactive      R Motor Strength L Motor Strength    R : 5/5  R dorsiflex: 5/5  R plantarflex: 5/5 L : 5/5  L dorsiflex: 5/5  L plantarflex: 5/5        Sensory:  No sensory deficit  Exposure:       Completed: Yes      Secondary Survey:  Physical Exam  Vitals and nursing note reviewed  Constitutional:       General: He is awake  Appearance: Normal appearance  He is well-developed and normal weight  Comments: /70 (BP Location: Right arm)   Pulse 67   Temp 97 7 °F (36 5 °C)   Resp 16   Ht 6' 1" (1 854 m)   Wt 89 6 kg (197 lb 8 5 oz)   SpO2 98%   BMI 26 06 kg/m²      HENT:      Head: Normocephalic and atraumatic  Jaw: There is normal jaw occlusion  Right Ear: Hearing, tympanic membrane, ear canal and external ear normal       Left Ear: Hearing, tympanic membrane, ear canal and external ear normal       Nose: Nose normal       Mouth/Throat:      Lips: Pink  Mouth: Mucous membranes are moist       Tongue: No lesions  Pharynx: Oropharynx is clear  Eyes:      General: Lids are normal  Vision grossly intact  Extraocular Movements: Extraocular movements intact  Conjunctiva/sclera: Conjunctivae normal       Pupils: Pupils are equal, round, and reactive to light  Neck:      Thyroid: No thyroid mass  Vascular: No JVD  Trachea: Trachea and phonation normal       Comments: No midline tenderness, no stepoffs  No tenderness with passive and active cervical ROM with head turning approximately 45 degree angles to the left and right  No cervical tenderness with cranial axial loading    Cardiovascular:      Rate and Rhythm: Normal rate and regular rhythm  Pulses: Normal pulses  Radial pulses are 2+ on the right side and 2+ on the left side  Dorsalis pedis pulses are 2+ on the right side and 2+ on the left side  Heart sounds: Normal heart sounds  Pulmonary:      Effort: Pulmonary effort is normal       Breath sounds: Normal breath sounds and air entry  Abdominal:      General: Abdomen is flat  Bowel sounds are normal       Palpations: Abdomen is soft  Tenderness: There is no abdominal tenderness  Musculoskeletal:         General: Normal range of motion        Cervical back: Full passive range of motion without pain, normal range of motion and neck supple  Comments: Passive ROM intact  Upper and lower extremity 5/5 bilaterally  Neurovascularly intact  No grinding or clicking of joints       Lymphadenopathy:      Head:      Right side of head: No submental, submandibular, tonsillar, preauricular, posterior auricular or occipital adenopathy  Left side of head: No submental, submandibular, tonsillar, preauricular, posterior auricular or occipital adenopathy  Cervical: No cervical adenopathy  Right cervical: No superficial, deep or posterior cervical adenopathy  Left cervical: No superficial, deep or posterior cervical adenopathy  Skin:     General: Skin is warm  Capillary Refill: Capillary refill takes less than 2 seconds  Neurological:      General: No focal deficit present  Mental Status: He is alert and oriented to person, place, and time  Mental status is at baseline  GCS: GCS eye subscore is 4  GCS verbal subscore is 5  GCS motor subscore is 6  Cranial Nerves: Cranial nerves are intact and 2-12 are intact  Sensory: Sensation is intact  Motor: Motor function is intact  Coordination: Coordination is intact  Gait: Gait is intact  Deep Tendon Reflexes: Reflexes are normal and symmetric  Reflex Scores:       Patellar reflexes are 2+ on the right side and 2+ on the left side  Psychiatric:         Attention and Perception: Attention and perception normal          Mood and Affect: Mood and affect normal          Speech: Speech normal          Behavior: Behavior normal  Behavior is cooperative  Thought Content:  Thought content normal          Invasive Devices     Peripheral Intravenous Line  Duration           Peripheral IV 12/05/22 Left Antecubital <1 day              Lab Results:   BMP/CMP:   Lab Results   Component Value Date    SODIUM 139 12/05/2022    K 4 3 12/05/2022     12/05/2022    CO2 23 12/05/2022    BUN 20 12/05/2022 CREATININE 1 56 (H) 12/05/2022    CALCIUM 9 3 12/05/2022    AST 21 12/05/2022    ALT 14 12/05/2022    ALKPHOS 54 12/05/2022    EGFR 42 12/05/2022   , CBC:   Lab Results   Component Value Date    WBC 6 83 12/05/2022    HGB 14 7 12/05/2022    HCT 44 4 12/05/2022    MCV 95 12/05/2022     12/05/2022    MCH 31 3 12/05/2022    MCHC 33 1 12/05/2022    RDW 13 3 12/05/2022    MPV 10 5 12/05/2022    NRBC 0 12/05/2022   , AST:   Lab Results   Component Value Date    AST 21 12/05/2022   , ALT:   Lab Results   Component Value Date    ALT 14 12/05/2022     Imaging Results: I have personally reviewed pertinent reports  Chest Xray(s): negative for acute findings   FAST exam(s): N/A   CT Scan(s): negative for acute findings   Additional Xray(s): N/A     Other Studies: n/a    Code Status: Level 1 - Full Code  Advance Directive and Living Will:      Power of :    POLST:    I have spent 28 minutes with Patient  today in which greater than 50% of this time was spent in counseling/coordination of care regarding Diagnostic results, Prognosis, Risks and benefits of tx options, Intructions for management, Patient and family education, Importance of tx compliance, Risk factor reductions and Impressions

## 2022-12-05 NOTE — ED NOTES
Patient transported to [facility] [address] [facility], [address] [place] [address]  12/05/22 6217

## 2022-12-05 NOTE — CONSULTS
Consultation - Cardiology Team One  Esvin Orona 68 y o  male MRN: [id number]  Unit/Bed#: ED-41 Encounter: [id number]    Inpatient consult to Cardiology  Consult performed by: Ophelia Farmer PA-C  Consult ordered by: Rickey Mendoza PA-C          Physician Requesting Consult: Ro Reardon MD  Reason for Consult / Principal Problem: Syncope    Assessment:    1  Syncope: Occurred while taking a hot shower with prodromal symptoms of dizziness that likely was a vasovagal episode  This occurred after he swam his usual 50 labs  Patient usually takes his antihypertensive medications after swimming however today he took them before  Reports hypotension with systolic BP in the 80C on EMS arrival   He denies any chest pain or palpitations  EKG with no acute ischemic change  HS troponin negative x 3   2  Preserved Biventricular Systolic Function: EF 39%, mild LVH, G1DD, normal RVSF, no significant valvular abnormality on echocardiogram 3/2019   3  Essential Hypertension: Average /75 on amlodipine 5 mg daily and losartan 100 mg daily that he took prior to swimming this morning  Usually he takes after exercising  4  Hyperlipidemia: Lipid panel 8/2022 , TG 76, HDL 62, LDL 76 on Crestor 10 mg daily  Plan/Recommendations:  · Likely vasovagal syncope  · Will check echocardiogram to evaluate heart function valvular status  · Check orthostatic vital signs  · Continue to monitor on telemetry  · If echocardiogram unremarkable then no further workup will be indicated from cardiac standpoint  · Continue home medication regimen  __________________________________________________________    CC: syncope      History of Present Illness   HPI: Esvin Orona is a 68y o  year old male who has essential hypertension, hyperlipidemia who follows with cardiologist Dr Ina Bolton  Patient was last seen in the office in 2018 for atypical chest pain   Patient presented to the ED at [facility] on 12/5/2022 via EMS after a syncopal episode at a local community center  Patient had gone swimming and then while showering passed out  He reported feeling dizzy while shampooing  On arrival to the ED patients VSS with oxygen saturation 99% on RA  EKG revealed sinus rhythm with no acute ischemic change  CTH and C-spine revealed no acute traumatic injury  Labs revealed Cr 1 56, HS troponin negative x3, stable CBC, D-Dimer 0 63  Patient received a 1L bolus of IVF  Cardiology has been consulted for further evaluation and management of syncope  Home medication regimen includes amlodipine 5 mg daily, losartan 100 mg daily and crestor 10 mg daily  Patient resting in bed during consultation and states he was in his usual state of health this morning when he went to exercise  He swims 3 days a week and does 48 laps in the pool  After completing his 48 labs he walked over to the shower and had very hot  He started shin point chair when he started to feel lightheaded with subsequent loss of consciousness  He had a syncopal episode 4 years ago after he was done showing snow when he bent down with similar symptoms of lightheadedness when he stood back  The only change in his routine today with that he took both of his blood pressure medications in the morning prior to exercising when usually he takes them afterwards  He reports low BP with systolic in the 45N on EMS arrival       Echocardiogram 3/5/2019: EF 55%, mild LVH, G1DD, normal RVSF, no significant valvular abnormality  Exercise stress test 2/26/2018: No evidence of ischemia by EKG but patient did report chest pain  He exercised for 9 min achieving 101% mas predicted HR for age  EKG reviewed personally: 12/5/2022- normal sinus rhythm at a rate of 83 bpm with 1 degree AVB  No significant change when compared to the EKG from 3/4/2019  Telemetry reviewed personally:  Sinus rhythm with heart rate in the 70s  Review of Systems   Constitutional: Negative   Negative for chills  Cardiovascular: Negative for chest pain, dyspnea on exertion, leg swelling, near-syncope, orthopnea, palpitations, paroxysmal nocturnal dyspnea and syncope  Respiratory: Negative  Negative for cough, shortness of breath and wheezing  Endocrine: Negative  Hematologic/Lymphatic: Negative  Skin: Negative  Musculoskeletal: Negative  Gastrointestinal: Negative  Negative for diarrhea, nausea and vomiting  Neurological: Negative for dizziness, light-headedness and weakness  Psychiatric/Behavioral: Negative  Negative for altered mental status  All other systems reviewed and are negative      Historical Information   Past Medical History:   Diagnosis Date   • Benign essential hypertension    • Broken ribs    • Chest pain      Past Surgical History:   Procedure Laterality Date   • APPENDECTOMY     • LUMBAR LAMINECTOMY Bilateral 2018   • TONSILLECTOMY       Social History     Substance and Sexual Activity   Alcohol Use Yes    Comment: Rare     Social History     Substance and Sexual Activity   Drug Use Not Currently     Social History     Tobacco Use   Smoking Status Former   • Types: Cigarettes   • Quit date:    • Years since quittin 9   Smokeless Tobacco Never     Family History:   Family History   Problem Relation Age of Onset   • Colon cancer Mother    • Cancer Father    • Thyroid cancer Father    • Other Father         MOTOR VEHICLE TRAFFIC ACCIDENT   • Ovarian cancer Sister        Meds/Allergies   all current active meds have been reviewed, current meds:   Current Facility-Administered Medications   Medication Dose Route Frequency   • acetaminophen (TYLENOL) tablet 650 mg  650 mg Oral Q4H PRN   • amLODIPine (NORVASC) tablet 5 mg  5 mg Oral Daily   • ascorbic acid (VITAMIN C) tablet 1,000 mg  1,000 mg Oral Daily   • cholecalciferol (VITAMIN D3) tablet 1,000 Units  1,000 Units Oral Daily   • enoxaparin (LOVENOX) subcutaneous injection 30 mg  30 mg Subcutaneous Q12H [facility]   • HYDROmorphone (DILAUDID) injection 0 5 mg  0 5 mg Intravenous Q1H PRN   • losartan (COZAAR) tablet 100 mg  100 mg Oral Daily   • multi-electrolyte (ISOLYTE-S PH 7 4) bolus 500 mL  500 mL Intravenous Once   • multivitamin stress formula tablet 1 tablet  1 tablet Oral Daily   • naloxone (NARCAN) 0 04 mg/mL syringe 0 04 mg  0 04 mg Intravenous Q1MIN PRN   • oxyCODONE (ROXICODONE) immediate release tablet 10 mg  10 mg Oral Q6H PRN   • oxyCODONE (ROXICODONE) IR tablet 5 mg  5 mg Oral Q6H PRN   • pravastatin (PRAVACHOL) tablet 80 mg  80 mg Oral Daily With Dinner    and PTA meds:   Prior to Admission Medications   Prescriptions Last Dose Informant Patient Reported? Taking? Cholecalciferol (Vitamin D3) 50 MCG (2000 UT) capsule   No No   Sig: Take 1 capsule (2,000 Units total) by mouth daily   DAILY MULTIPLE VITAMINS tablet   Yes No   Sig: Take 1 tablet by mouth daily   Diclofenac Sodium (VOLTAREN) 1 %   No No   Sig: Apply 2 g topically 4 (four) times a day   amLODIPine (NORVASC) 5 mg tablet   No No   Sig: Take 1 tablet (5 mg total) by mouth daily   ascorbic acid (VITAMIN C) 1000 MG tablet   Yes No   Sig: Take 1,000 mg by mouth   cetirizine (ZyrTEC) 10 mg tablet   Yes No   Sig: Take 1 tablet by mouth   fluticasone (FLONASE) 50 mcg/act nasal spray   No No   Sig: SHAKE LIQUID AND USE 2 SPRAYS IN EACH NOSTRIL DAILY   glucosamine-chondroitin 500-400 MG tablet   Yes No   Sig: Take 1 tablet by mouth daily   hydrocortisone 2 5 % cream   No No   Sig: Apply topically 2 (two) times a day as needed for rash   losartan (COZAAR) 100 MG tablet   No No   Sig: Take 1 tablet (100 mg total) by mouth daily   rosuvastatin (CRESTOR) 10 MG tablet   No No   Sig: Take 1 tablet (10 mg total) by mouth daily      Facility-Administered Medications: None          Allergies   Allergen Reactions   • Diazepam      Increased anger     • Gabapentin Other (See Comments)     Severe mood swings, anger       Objective   Vitals: Blood pressure 143/70, pulse 67, temperature 97 7 °F (36 5 °C), resp  rate 16, height 6' 1" (1 854 m), weight 89 6 kg (197 lb 8 5 oz), SpO2 98 %  ,     Body mass index is 26 06 kg/m²  ,     Systolic (31UHS), XRN:825 , Min:124 , QBH:572     Diastolic (47VBW), SLL:15, Min:70, Max:92    Wt Readings from Last 3 Encounters:   12/05/22 89 6 kg (197 lb 8 5 oz)   10/03/22 90 4 kg (199 lb 6 4 oz)   06/02/22 96 2 kg (212 lb)      Lab Results   Component Value Date    CREATININE 1 56 (H) 12/05/2022    CREATININE 1 33 (H) 08/09/2022    CREATININE 1 25 03/23/2022           No intake or output data in the 24 hours ending 12/05/22 1425  Weight (last 2 days)     Date/Time Weight    12/05/22 0721 89 6 (197 53)        Invasive Devices     Peripheral Intravenous Line  Duration           Peripheral IV 12/05/22 Left Antecubital <1 day                  Physical Exam  Vitals and nursing note reviewed  Constitutional:       General: He is not in acute distress  Appearance: He is well-developed and well-nourished  Comments: On RA in NAD   HENT:      Head: Normocephalic and atraumatic  Comments: Laceration on posterior repaired by primary team  Neck:      Vascular: No JVD  Cardiovascular:      Rate and Rhythm: Normal rate and regular rhythm  Heart sounds: Normal heart sounds  No murmur heard  No friction rub  No gallop  Pulmonary:      Effort: Pulmonary effort is normal  No respiratory distress  Breath sounds: Normal breath sounds  No wheezing or rales  Chest:      Chest wall: No tenderness  Abdominal:      General: Bowel sounds are normal  There is no distension  Palpations: Abdomen is soft  Tenderness: There is no abdominal tenderness  Musculoskeletal:         General: No tenderness or edema  Normal range of motion  Cervical back: Normal range of motion and neck supple  Right lower leg: No edema  Left lower leg: No edema  Skin:     General: Skin is warm and dry  Coloration: Skin is not pale  Findings: No erythema  Neurological:      Mental Status: He is alert and oriented to person, place, and time  Psychiatric:         Mood and Affect: Mood and affect and mood normal          Behavior: Behavior normal          Thought Content:  Thought content normal          Judgment: Judgment normal            LABORATORY RESULTS:      CBC with diff:   Results from last 7 days   Lab Units 22  0739   WBC Thousand/uL 6 83   HEMOGLOBIN g/dL 14 7   HEMATOCRIT % 44 4   MCV fL 95   PLATELETS Thousands/uL 308   MCH pg 31 3   MCHC g/dL 33 1   RDW % 13 3   MPV fL 10 9   NRBC AUTO /100 WBCs 0       CMP:  Results from last 7 days   Lab Units 22  0739   POTASSIUM mmol/L 4 3   CHLORIDE mmol/L 105   CO2 mmol/L 23   BUN mg/dL 20   CREATININE mg/dL 1 56*   CALCIUM mg/dL 9 3   AST U/L 21   ALT U/L 14   ALK PHOS U/L 54   EGFR ml/min/1 73sq m 42       BMP:  Results from last 7 days   Lab Units 22  0739   POTASSIUM mmol/L 4 3   CHLORIDE mmol/L 105   CO2 mmol/L 23   BUN mg/dL 20   CREATININE mg/dL 1 56*   CALCIUM mg/dL 9 3          No results found for: NTBNP    Lipid Profile:   Lab Results   Component Value Date    CHOL 196 2014     Lab Results   Component Value Date    HDL 62 2022    HDL 62 2022    HDL 56 2021     Lab Results   Component Value Date    LDLCALC 76 2022    LDLCALC 70 2022    LDLCALC 71 2021     Lab Results   Component Value Date    TRIG 76 2022    TRIG 71 2022    TRIG 78 2021         Cardiac testing:   Results for orders placed during the hospital encounter of 19    Echo complete with contrast if indicated    Narrative  [address]  [phone]    Transthoracic Echocardiogram  2D, M-mode, Doppler, and Color Doppler    Study date:  05-Mar-2019    Patient: Yoshi Jack  MR number: [id number]  Account number: [de-identified]  : [id number]  Age: 68 years  Gender: Male  Status: Outpatient  Location: Bedside  Height: 73 in  Weight: 215 lb  BP: 147/ 90 mmHg    Indications: Syncope  Diagnoses: R55  - Syncope and collapse    Sonographer:  Martin Ramey RDCS  Primary Physician:  Hang Mendez MD  Referring Physician:  Chaz Craig DO  Group:  [facility]  Interpreting Physician:  Liliya Ruiz MD    SUMMARY    LEFT VENTRICLE:  Size was normal   Systolic function was normal  Ejection fraction was estimated to be 55 %  There was mild concentric hypertrophy  Doppler parameters were consistent with abnormal left ventricular relaxation (grade 1 diastolic dysfunction)  RIGHT VENTRICLE:  The size was normal   Systolic function was normal     TRICUSPID VALVE:  There was trace regurgitation  COMPARISONS:  There has been no significant interval change  Comparison was made with the previous study of 26-Feb-2018  HISTORY: PRIOR HISTORY: Seizure, chest pain, hypertension  Family history of cancer  PROCEDURE: The procedure was performed at the bedside  This was a routine study  The transthoracic approach was used  The study included complete 2D imaging, M-mode, complete spectral Doppler, and color Doppler  The heart rate was 63 bpm,  at the start of the study  LEFT VENTRICLE: Size was normal  Systolic function was normal  Ejection fraction was estimated to be 55 %  There were no regional wall motion abnormalities  Wall thickness was mildly increased  There was mild concentric hypertrophy  DOPPLER: Doppler parameters were consistent with abnormal left ventricular relaxation (grade 1 diastolic dysfunction)  RIGHT VENTRICLE: The size was normal  Systolic function was normal  Wall thickness was normal     LEFT ATRIUM: Size was normal     RIGHT ATRIUM: Size was normal     MITRAL VALVE: Valve structure was normal  There was normal leaflet separation  DOPPLER: The transmitral velocity was within the normal range   There was no evidence for stenosis  There was no regurgitation  AORTIC VALVE: The valve was trileaflet  Leaflets exhibited normal thickness and normal cuspal separation  DOPPLER: Transaortic velocity was within the normal range  There was no evidence for stenosis  There was no regurgitation  TRICUSPID VALVE: The valve structure was normal  There was normal leaflet separation  DOPPLER: The transtricuspid velocity was within the normal range  There was no evidence for stenosis  There was trace regurgitation  The tricuspid jet  envelope definition was inadequate for estimation of RV systolic pressure  There are no indirect findings suggestive of moderate or severe pulmonary hypertension  PULMONIC VALVE: Leaflets exhibited normal thickness, no calcification, and normal cuspal separation  DOPPLER: The transpulmonic velocity was within the normal range  There was no regurgitation  PERICARDIUM: There was no pericardial effusion  The pericardium was normal in appearance  AORTA: The root exhibited normal size  SYSTEMIC VEINS: IVC: The inferior vena cava was normal in size and course  Respirophasic changes were normal     SYSTEM MEASUREMENT TABLES    2D mode  Aortic Root Diameter; User chosen value; 2D mode;: 3 5 cm  LA/Ao (2D): 1 14  Left Atrium Paul-posterior Systolic Dimension; User chosen value; 2D mode;: 4 cm  EDV (2D-Cubed): 186 cm3  EF (2D-Cubed): 71 9 %  ESV (2D-Cubed): 52 3 cm3  FS (2D-Cubed): 34 5 %  FS (2D-Teich): 34 5 %  IVS/LVPW (2D): 0 82  Interventricular Septum Diastolic Thickness; User chosen value; 2D mode;: 1 09 cm  Left Ventricle Internal End Diastolic Dimension; User chosen value; 2D mode;: 5 71 cm  Left Ventricle Internal Systolic Dimension; User chosen value; 2D mode;: 3 74 cm  Left Ventricle Posterior Wall Diastolic Thickness; User chosen value; 2D mode;: 1 33 cm  Left Ventricular Ejection Fraction; Teichholz; 2D mode;: 63 %  Left Ventricular End Diastolic Volume;  Teichholz; 2D mode;: 161 cm3  Left Ventricular End Systolic Volume; Teichholz; 2D mode;: 59 6 cm3  SV (2D-Cubed): 134 cm3  Stroke Volume; Teichholz; 2D mode;: 101 cm3    Apical four chamber  Left Atrium MOD Diam; Most recent value chosen; End Systole; Apical four chamber;: 2 07 cm  Left Atrium MOD Diam; Most recent value chosen; End Systole; Apical four chamber;: 3 07 cm  Left Atrium MOD Diam; Most recent value chosen; End Systole; Apical four chamber;: 3 92 cm  Left Atrium MOD Diam; Most recent value chosen; End Systole; Apical four chamber;: 3 95 cm  Left Atrium MOD Diam; Most recent value chosen; End Systole; Apical four chamber;: 3 95 cm  Left Atrium MOD Diam; Most recent value chosen; End Systole; Apical four chamber;: 3 82 cm  Left Atrium MOD Diam; Most recent value chosen; End Systole; Apical four chamber;: 1 55 cm  Left Atrium MOD Diam; Most recent value chosen; End Systole; Apical four chamber;: 1 94 cm  Left Atrium MOD Diam; Most recent value chosen; End Systole; Apical four chamber;: 2 2 cm  Left Atrium MOD Diam; Most recent value chosen; End Systole; Apical four chamber;: 2 46 cm  Left Atrium MOD Diam; Most recent value chosen; End Systole; Apical four chamber;: 2 58 cm  Left Atrium MOD Diam; Most recent value chosen; End Systole; Apical four chamber;: 2 81 cm  Left Atrium MOD Diam; Most recent value chosen; End Systole; Apical four chamber;: 3 1 cm  Left Atrium MOD Diam; Most recent value chosen; End Systole; Apical four chamber;: 3 3 cm  Left Atrium MOD Diam; Most recent value chosen; End Systole; Apical four chamber;: 3 5 cm  Left Atrium MOD Diam; Most recent value chosen; End Systole; Apical four chamber;: 3 56 cm  Left Atrium MOD Diam; Most recent value chosen; End Systole; Apical four chamber;: 3 65 cm  Left Atrium MOD Diam; Most recent value chosen; End Systole; Apical four chamber;: 3 82 cm  Left Atrium MOD Diam; Most recent value chosen; End Systole;  Apical four chamber;: 3 88 cm  Left Atrium MOD Diam; Most recent value chosen; End Systole; Apical four chamber;: 4 01 cm  Left Atrium Systolic Area; Most recent value chosen; Method of Disks, Single Plane; 2D mode; Apical four chamber;: 1570 mm2  Left Atrium Systolic Volume; Most recent value chosen; Method of Disks, Single Plane; 2D mode; Apical four chamber;: 40 cm3  Left Atrium systolic major axis; Most recent value chosen; Method of Disks, Single Plane; 2D mode; Apical four chamber;: 4 79 cm  LV MOD Diam; Recent value; End Diastole (A4C): 2 77 cm  LV MOD Diam; Recent value; End Diastole (A4C): 4 56 cm  LV MOD Diam; Recent value; End Diastole (A4C): 5 29 cm  LV MOD Diam; Recent value; End Diastole (A4C): 5 33 cm  LV MOD Diam; Recent value; End Diastole (A4C): 5 27 cm  LV MOD Diam; Recent value; End Diastole (A4C): 5 07 cm  LV MOD Diam; Recent value; End Diastole (A4C): 2 02 cm  LV MOD Diam; Recent value; End Diastole (A4C): 2 85 cm  LV MOD Diam; Recent value; End Diastole (A4C): 3 51 cm  LV MOD Diam; Recent value; End Diastole (A4C): 4 01 cm  LV MOD Diam; Recent value; End Diastole (A4C): 4 3 cm  LV MOD Diam; Recent value; End Diastole (A4C): 4 56 cm  LV MOD Diam; Recent value; End Diastole (A4C): 4 76 cm  LV MOD Diam; Recent value; End Diastole (A4C): 4 91 cm  LV MOD Diam; Recent value; End Diastole (A4C): 4 94 cm  LV MOD Diam; Recent value; End Diastole (A4C): 4 96 cm  LV MOD Diam; Recent value; End Diastole (A4C): 5 17 cm  LV MOD Diam; Recent value; End Diastole (A4C): 5 29 cm  LV MOD Diam; Recent value; End Diastole (A4C): 5 36 cm  LV MOD Diam; Recent value; End Diastole (A4C): 5 22 cm  LV MOD Diam; Recent value; End Systole (A4C): 1 92 cm  LV MOD Diam; Recent value; End Systole (A4C): 3 71 cm  LV MOD Diam; Recent value; End Systole (A4C): 3 61 cm  LV MOD Diam; Recent value; End Systole (A4C): 3 73 cm  LV MOD Diam; Recent value; End Systole (A4C): 2 84 cm  LV MOD Diam; Recent value; End Systole (A4C): 3 cm  LV MOD Diam; Recent value;  End Systole (A4C): 3 08 cm  LV MOD Diam; Recent value; End Systole (A4C): 3 14 cm  LV MOD Diam; Recent value; End Systole (A4C): 3 21 cm  LV MOD Diam; Recent value; End Systole (A4C): 3 3 cm  LV MOD Diam; Recent value; End Systole (A4C): 3 45 cm  LV MOD Diam; Recent value; End Systole (A4C): 3 61 cm  LV MOD Diam; Recent value; End Systole (A4C): 3 73 cm  LV MOD Diam; Recent value; End Systole (A4C): 1 62 cm  LV MOD Diam; Recent value; End Systole (A4C): 2 15 cm  LV MOD Diam; Recent value; End Systole (A4C): 2 56 cm  LV MOD Diam; Recent value; End Systole (A4C): 3 63 cm  LV MOD Diam; Recent value; End Systole (A4C): 3 66 cm  LV MOD Diam; Recent value; End Systole (A4C): 3 75 cm  LV MOD Diam; Recent value; End Systole (A4C): 3 71 cm  LVEF MOD A4C: 59 %  Left Ventricle diastolic major axis; Most recent value chosen; Method of Disks, Single Plane; 2D mode; Apical four chamber;: 9 61 cm  Left Ventricle systolic major axis; Most recent value chosen; Method of Disks, Single Plane; 2D mode; Apical four chamber;: 7 98 cm  Left Ventricular Diastolic Area; Most recent value chosen; Method of Disks, Single Plane; 2D mode; Apical four chamber;: 4380 mm2  Left Ventricular End Diastolic Volume; Most recent value chosen; Method of Disks, Single Plane; 2D mode; Apical four chamber;: 160 cm3  Left Ventricular End Systolic Volume; Most recent value chosen; Method of Disks, Single Plane; 2D mode; Apical four chamber;: 65 cm3  Left Ventricular Systolic Area; Most recent value chosen; Method of Disks, Single Plane; 2D mode; Apical four chamber;: 2550 mm2  SV MOD A4C: 94 cm3    Unspecified Scan Mode  DT; Antegrade Flow: 366 ms  DT; Mean; Antegrade Flow: 366 ms  MV A Parminder: 603 mm/s  MV E Parminder: 644 mm/s  MV E/A Ratio: 1 1  MV Peak A Parminder: 603 mm/s  MV Peak E Parminder; Mean;  Antegrade Flow: 644 mm/s  Peak Grad; Mean; Regurgitant Flow: 26 mm[Hg]  Vmax; Mean; Regurgitant Flow: 2530 mm/s  Vmax; Regurgitant Flow: 2310 mm/s  Vmax; Regurgitant Flow: 2660 mm/s  Vmax; Regurgitant Flow: 2630 mm/s  Atrial [facility]; Most recent value chosen;: 2320 mm2  Atrial [facility]; Most recent value chosen;: 5 32 cm  Atrial [place]ichey Volume; Most recent value chosen;: 81 cm3  Distance;: 10 4 cm  Distance;: 3 2 cm  Distance; Mean; Mean value chosen;: 6 8 cm  Distance; User chosen value;: 3 1 cm    [employer] Accredited Echocardiography Laboratory    Prepared and electronically signed by    Nando Collins MD  Signed 05-Mar-2019 15:57:34    No results found for this or any previous visit  No valid procedures specified  No results found for this or any previous visit  Imaging: I have personally reviewed pertinent reports  CT head without contrast    Result Date: 12/5/2022  Narrative: CT BRAIN - WITHOUT CONTRAST INDICATION:   Head trauma, moderate-severe Fall with head impact  COMPARISON:  CT 3/4/2019  TECHNIQUE:  CT examination of the brain was performed  In addition to axial images, sagittal and coronal 2D reformatted images were created and submitted for interpretation  Radiation dose length product (DLP) for this visit:  1098 mGy-cm   This examination, like all CT scans performed in the [place], was performed utilizing techniques to minimize radiation dose exposure, including the use of iterative reconstruction and automated exposure control  IMAGE QUALITY:  Diagnostic  FINDINGS: PARENCHYMA: Decreased attenuation is noted in periventricular and subcortical white matter demonstrating an appearance that is statistically most likely to represent mild microangiopathic change  No CT signs of acute infarction  No intracranial mass, mass effect or midline shift  No acute parenchymal hemorrhage  VENTRICLES AND EXTRA-AXIAL SPACES:  Normal for the patient's age  VISUALIZED ORBITS AND PARANASAL SINUSES:  Normal visualized orbits  Normal visualized paranasal sinuses  CALVARIUM AND EXTRACRANIAL SOFT TISSUES:  Normal      Impression: No acute intracranial abnormality   Workstation performed: [id number]     CT cervical spine without contrast    Result Date: 12/5/2022  Narrative: CT CERVICAL SPINE - WITHOUT CONTRAST INDICATION:   Fall with head impact  COMPARISON:  None  TECHNIQUE:  CT examination of the cervical spine was performed without intravenous contrast   Contiguous axial images were obtained  Sagittal and coronal reconstructions were performed  Radiation dose length product (DLP) for this visit:  386 mGy-cm   This examination, like all CT scans performed in the [facility], was performed utilizing techniques to minimize radiation dose exposure, including the use of iterative reconstruction and automated exposure control  IMAGE QUALITY:  Diagnostic  FINDINGS: ALIGNMENT:  Straightening of the normal cervical lordosis  Degenerative grade 1 retrolisthesis at C5-6 and grade 1 anterolisthesis at C7-T1  VERTEBRAL BODIES:  No fracture  DEGENERATIVE CHANGES:  Severe multilevel cervical degenerative changes are noted  No critical central canal stenosis  PREVERTEBRAL AND PARASPINAL SOFT TISSUES:  Unremarkable  THORACIC INLET:  Normal      Impression: No cervical spine fracture or traumatic malalignment  Workstation performed: [id number]     Counseling / Coordination of Care  Total floor / unit time spent today 45 minutes  Greater than 50% of total time was spent with the patient and / or family counseling and / or coordination of care  A description of the counseling / coordination of care: Review of history, current assessment, development of a plan  Code Status: Level 1 - Full Code    ** Please Note: Dragon 360 Dictation voice to text software may have been used in the creation of this document   **

## 2022-12-05 NOTE — ASSESSMENT & PLAN NOTE
- Serum creatinine creatinine improved today to 1 26  - Patient with baseline serum creatinine 1 25  - Patient report of no imbibed fluids this morning prior workout; likely prerenal  - Avoid nephrotoxic medications  -Trend creatinine

## 2022-12-05 NOTE — ASSESSMENT & PLAN NOTE
- Patient with scalp laceration status post fall  - Emergency physician with laceration closed; surgical glue  - Wound edges well approximated  - Will continue to monitor wound

## 2022-12-06 ENCOUNTER — APPOINTMENT (OUTPATIENT)
Dept: NON INVASIVE DIAGNOSTICS | Facility: HOSPITAL | Age: 77
End: 2022-12-06

## 2022-12-06 VITALS
RESPIRATION RATE: 16 BRPM | DIASTOLIC BLOOD PRESSURE: 99 MMHG | SYSTOLIC BLOOD PRESSURE: 161 MMHG | HEIGHT: 73 IN | HEART RATE: 58 BPM | TEMPERATURE: 97.9 F | OXYGEN SATURATION: 98 % | WEIGHT: 197 LBS | BODY MASS INDEX: 26.11 KG/M2

## 2022-12-06 LAB
ANION GAP SERPL CALCULATED.3IONS-SCNC: 6 MMOL/L (ref 4–13)
AORTIC ROOT: 3.4 CM
AORTIC VALVE MEAN VELOCITY: 10.7 M/S
APICAL FOUR CHAMBER EJECTION FRACTION: 61 %
ASCENDING AORTA: 3.3 CM
AV AREA BY CONTINUOUS VTI: 3 CM2
AV AREA PEAK VELOCITY: 2.4 CM2
AV LVOT MEAN GRADIENT: 2 MMHG
AV LVOT PEAK GRADIENT: 4 MMHG
AV MEAN GRADIENT: 5 MMHG
AV PEAK GRADIENT: 9 MMHG
AV VALVE AREA: 2.96 CM2
AV VELOCITY RATIO: 0.69
BASOPHILS # BLD AUTO: 0.04 THOUSANDS/ÂΜL (ref 0–0.1)
BASOPHILS NFR BLD AUTO: 0 % (ref 0–1)
BUN SERPL-MCNC: 22 MG/DL (ref 5–25)
CALCIUM SERPL-MCNC: 9.2 MG/DL (ref 8.4–10.2)
CHLORIDE SERPL-SCNC: 108 MMOL/L (ref 96–108)
CO2 SERPL-SCNC: 26 MMOL/L (ref 21–32)
CREAT SERPL-MCNC: 1.26 MG/DL (ref 0.6–1.3)
DOP CALC AO PEAK VEL: 1.47 M/S
DOP CALC AO VTI: 27.3 CM
DOP CALC LVOT AREA: 3.46 CM2
DOP CALC LVOT DIAMETER: 2.1 CM
DOP CALC LVOT PEAK VEL VTI: 23.38 CM
DOP CALC LVOT PEAK VEL: 1.01 M/S
DOP CALC LVOT STROKE INDEX: 38.8 ML/M2
DOP CALC LVOT STROKE VOLUME: 80.94
E WAVE DECELERATION TIME: 218 MS
EOSINOPHIL # BLD AUTO: 0.2 THOUSAND/ÂΜL (ref 0–0.61)
EOSINOPHIL NFR BLD AUTO: 2 % (ref 0–6)
ERYTHROCYTE [DISTWIDTH] IN BLOOD BY AUTOMATED COUNT: 13.6 % (ref 11.6–15.1)
FRACTIONAL SHORTENING: 29 (ref 28–44)
GFR SERPL CREATININE-BSD FRML MDRD: 54 ML/MIN/1.73SQ M
GLUCOSE P FAST SERPL-MCNC: 95 MG/DL (ref 65–99)
GLUCOSE SERPL-MCNC: 95 MG/DL (ref 65–140)
HCT VFR BLD AUTO: 43.2 % (ref 36.5–49.3)
HGB BLD-MCNC: 14.3 G/DL (ref 12–17)
IMM GRANULOCYTES # BLD AUTO: 0.03 THOUSAND/UL (ref 0–0.2)
IMM GRANULOCYTES NFR BLD AUTO: 0 % (ref 0–2)
INTERVENTRICULAR SEPTUM IN DIASTOLE (PARASTERNAL SHORT AXIS VIEW): 1.2 CM
INTERVENTRICULAR SEPTUM: 1.2 CM (ref 0.6–1.1)
LAAS-AP2: 22.6 CM2
LAAS-AP4: 18.2 CM2
LEFT ATRIUM AREA SYSTOLE SINGLE PLANE A4C: 17.5 CM2
LEFT ATRIUM SIZE: 3.5 CM
LEFT INTERNAL DIMENSION IN SYSTOLE: 2.9 CM (ref 2.1–4)
LEFT VENTRICULAR INTERNAL DIMENSION IN DIASTOLE: 4.1 CM (ref 3.5–6)
LEFT VENTRICULAR POSTERIOR WALL IN END DIASTOLE: 1.1 CM
LEFT VENTRICULAR STROKE VOLUME: 42 ML
LVSV (TEICH): 42 ML
LYMPHOCYTES # BLD AUTO: 1.44 THOUSANDS/ÂΜL (ref 0.6–4.47)
LYMPHOCYTES NFR BLD AUTO: 16 % (ref 14–44)
MCH RBC QN AUTO: 31 PG (ref 26.8–34.3)
MCHC RBC AUTO-ENTMCNC: 33.1 G/DL (ref 31.4–37.4)
MCV RBC AUTO: 94 FL (ref 82–98)
MONOCYTES # BLD AUTO: 0.71 THOUSAND/ÂΜL (ref 0.17–1.22)
MONOCYTES NFR BLD AUTO: 8 % (ref 4–12)
MV E'TISSUE VEL-LAT: 15 CM/S
MV E'TISSUE VEL-SEP: 6 CM/S
MV PEAK A VEL: 0.7 M/S
MV PEAK E VEL: 49 CM/S
MV STENOSIS PRESSURE HALF TIME: 63 MS
MV VALVE AREA P 1/2 METHOD: 3.49
NEUTROPHILS # BLD AUTO: 6.74 THOUSANDS/ÂΜL (ref 1.85–7.62)
NEUTS SEG NFR BLD AUTO: 74 % (ref 43–75)
NRBC BLD AUTO-RTO: 0 /100 WBCS
PLATELET # BLD AUTO: 271 THOUSANDS/UL (ref 149–390)
PMV BLD AUTO: 10.4 FL (ref 8.9–12.7)
POTASSIUM SERPL-SCNC: 4.3 MMOL/L (ref 3.5–5.3)
RA PRESSURE ESTIMATED: 5 MMHG
RBC # BLD AUTO: 4.61 MILLION/UL (ref 3.88–5.62)
RIGHT ATRIUM AREA SYSTOLE A4C: 18.5 CM2
RIGHT VENTRICLE ID DIMENSION: 4.6 CM
RV PSP: 31 MMHG
SL CV LEFT ATRIUM LENGTH A2C: 5.6 CM
SL CV LV EF: 60
SL CV PED ECHO LEFT VENTRICLE DIASTOLIC VOLUME (MOD BIPLANE) 2D: 73 ML
SL CV PED ECHO LEFT VENTRICLE SYSTOLIC VOLUME (MOD BIPLANE) 2D: 31 ML
SODIUM SERPL-SCNC: 140 MMOL/L (ref 135–147)
TR MAX PG: 26 MMHG
TR PEAK VELOCITY: 2.5 M/S
TRICUSPID VALVE PEAK REGURGITATION VELOCITY: 2.53 M/S
WBC # BLD AUTO: 9.16 THOUSAND/UL (ref 4.31–10.16)

## 2022-12-06 RX ADMIN — Medication 2000 UNITS: at 09:05

## 2022-12-06 RX ADMIN — ENOXAPARIN SODIUM 30 MG: 30 INJECTION SUBCUTANEOUS at 09:05

## 2022-12-06 RX ADMIN — LOSARTAN POTASSIUM 100 MG: 50 TABLET, FILM COATED ORAL at 09:05

## 2022-12-06 RX ADMIN — AMLODIPINE BESYLATE 5 MG: 5 TABLET ORAL at 09:05

## 2022-12-06 RX ADMIN — OXYCODONE HYDROCHLORIDE AND ACETAMINOPHEN 1000 MG: 500 TABLET ORAL at 09:05

## 2022-12-06 RX ADMIN — MULTIPLE VITAMINS W/ MINERALS TAB 1 TABLET: TAB ORAL at 09:05

## 2022-12-06 NOTE — PROGRESS NOTES
Cardiology Progress Note - Mohamud Islas 68 y o  male MRN: [id number]    Unit/Bed#: S -01 Encounter: [id number]        Hospital Problems:  Active Problems:    Syncope and collapse    Scalp laceration, initial encounter    MIAN (acute kidney injury) ([facility] [address] )      Assessment/Recommendations:    Syncope, most likely vasovagal: Echo to evaluate LV function  Preliminary bedside echo reviewed that shows normal LV systolic function and no significant aortic stenosis  Report pending  Scalp laceration related to syncope  Hypertension and dyslipidemia    Out patient cardiology follow-up to be scheduled at discharge  Discussed with the patient  Subjective:     Overnight events reviewed  Recurrence of syncope  No significant headache  No dizziness reported  Review of Systems   Constitutional: Negative for fever and unexpected weight change  HENT: Negative for congestion and trouble swallowing  Eyes: Negative for visual disturbance  Respiratory: Negative for chest tightness, shortness of breath and wheezing  Cardiovascular: Negative for chest pain, palpitations and leg swelling  Gastrointestinal: Negative for abdominal pain and blood in stool  Endocrine: Negative for polyuria  Genitourinary: Negative for hematuria  Musculoskeletal: Negative for arthralgias and myalgias  Skin: Negative for rash  Neurological: Negative for dizziness, tremors and weakness  Hematological: Does not bruise/bleed easily  Psychiatric/Behavioral: Negative for sleep disturbance                Current Facility-Administered Medications:   •  acetaminophen (TYLENOL) tablet 650 mg, 650 mg, Oral, Q4H PRN, Yuni Boyle PA-C  •  amLODIPine (NORVASC) tablet 5 mg, 5 mg, Oral, Daily, Yuni Boyle PA-C  •  ascorbic acid (VITAMIN C) tablet 1,000 mg, 1,000 mg, Oral, Daily, Yuni Boyle PA-C  •  cholecalciferol (VITAMIN D3) tablet 2,000 Units, 2,000 Units, Oral, Daily, Yuni Boyle PA-C  •  enoxaparin (LOVENOX) subcutaneous injection 30 mg, 30 mg, Subcutaneous, Q12H [facility], Josef Fuentes PA-C, 30 mg at 12/05/22 2104  •  HYDROmorphone (DILAUDID) injection 0 5 mg, 0 5 mg, Intravenous, Q1H PRN, Josef Fuentes PA-C  •  losartan (COZAAR) tablet 100 mg, 100 mg, Oral, Daily, Josef Fuentes PA-C  •  multivitamin-minerals (CENTRUM) tablet 1 tablet, 1 tablet, Oral, Daily, Josef Fuentes PA-C  •  naloxone (NARCAN) 0 04 mg/mL syringe 0 04 mg, 0 04 mg, Intravenous, Q1MIN PRN, Josef Fuentes PA-C  •  ondansetron [facility]) injection 4 mg, 4 mg, Intravenous, Q6H PRN, Josef Fuentes PA-C  •  oxyCODONE (ROXICODONE) immediate release tablet 10 mg, 10 mg, Oral, Q6H PRN, Josef Fuentes PA-C  •  oxyCODONE (ROXICODONE) IR tablet 5 mg, 5 mg, Oral, Q6H PRN, Josef Fuentes PA-C  •  polyethylene glycol (MIRALAX) packet 17 g, 17 g, Oral, Daily PRN, Josef Fuentes PA-C  •  pravastatin (PRAVACHOL) tablet 80 mg, 80 mg, Oral, Daily With Arthur LeatherCONSTANZA, 80 mg at 12/05/22 1820     Objective:     Vitals:   Blood pressure (!) 157/104, pulse 79, temperature 97 9 °F (36 6 °C), resp  rate 16, height 6' 1" (1 854 m), weight 89 6 kg (197 lb 8 5 oz), SpO2 98 %  Body mass index is 26 06 kg/m²  Orthostatic Blood Pressures    Flowsheet Row Most Recent Value   Blood Pressure 157/104 filed at 12/06/2022 8919   Patient Position - Orthostatic VS Sitting filed at 12/05/2022 1861         Systolic (45MGD), BKO:432 , Min:134 , ECR:897     Diastolic (25HVJ), KHZ:90, Min:66, Max:104    No intake or output data in the 24 hours ending 12/06/22 0833  Weight (last 2 days)     Date/Time Weight    12/05/22 0721 89 6 (197 53)            Physical Exam  Vitals reviewed  Constitutional:       General: He is not in acute distress  HENT:      Right Ear: External ear normal       Left Ear: External ear normal    Eyes:      General: No scleral icterus  Neck:      Vascular: No carotid bruit  Cardiovascular:      Rate and Rhythm: Normal rate and regular rhythm        Heart sounds: S1 normal and S2 normal  No murmur heard  Pulmonary:      Breath sounds: Normal breath sounds  No wheezing or rhonchi  Abdominal:      General: There is no distension  Musculoskeletal:      Right lower leg: No edema  Left lower leg: No edema  Skin:     General: Skin is warm  Comments: Laceration stable   Neurological:      General: No focal deficit present  Mental Status: He is alert  Psychiatric:         Mood and Affect: Mood normal            Labs & Results:        Results from last 7 days   Lab Units 12/06/22  0452 12/05/22  1444 12/05/22  0739   WBC Thousand/uL 9 16  --  6 83   HEMOGLOBIN g/dL 14 3  --  14 7   HEMATOCRIT % 43 2  --  44 4   PLATELETS Thousands/uL 271 239 308         Results from last 7 days   Lab Units 12/06/22  0452 12/05/22  0739   POTASSIUM mmol/L 4 3 4 3   CHLORIDE mmol/L 108 105   CO2 mmol/L 26 23   BUN mg/dL 22 20   CREATININE mg/dL 1 26 1 56*   CALCIUM mg/dL 9 2 9 3   ALK PHOS U/L  --  54   ALT U/L  --  14   AST U/L  --  21         Results from last 7 days   Lab Units 12/05/22  0739   MAGNESIUM mg/dL 1 9     No results for input(s): NTBNP in the last 72 hours  Imaging Studies:     XR chest portable    Result Date: 12/5/2022  Narrative: CHEST INDICATION:   syncope and collapse  COMPARISON:  Chest radiograph March 4, 2019  EXAM PERFORMED/VIEWS:  XR CHEST PORTABLE FINDINGS: Cardiomediastinal silhouette appears unremarkable  The lungs are clear  No pneumothorax or pleural effusion  Osseous structures appear within normal limits for patient age  Impression: No acute cardiopulmonary disease  Workstation performed: [id number]     CT head without contrast    Result Date: 12/5/2022  Narrative: CT BRAIN - WITHOUT CONTRAST INDICATION:   Head trauma, moderate-severe Fall with head impact  COMPARISON:  CT 3/4/2019  TECHNIQUE:  CT examination of the brain was performed    In addition to axial images, sagittal and coronal 2D reformatted images were created and submitted for interpretation  Radiation dose length product (DLP) for this visit:  1098 mGy-cm   This examination, like all CT scans performed in the [facility], was performed utilizing techniques to minimize radiation dose exposure, including the use of iterative reconstruction and automated exposure control  IMAGE QUALITY:  Diagnostic  FINDINGS: PARENCHYMA: Decreased attenuation is noted in periventricular and subcortical white matter demonstrating an appearance that is statistically most likely to represent mild microangiopathic change  No CT signs of acute infarction  No intracranial mass, mass effect or midline shift  No acute parenchymal hemorrhage  VENTRICLES AND EXTRA-AXIAL SPACES:  Normal for the patient's age  VISUALIZED ORBITS AND PARANASAL SINUSES:  Normal visualized orbits  Normal visualized paranasal sinuses  CALVARIUM AND EXTRACRANIAL SOFT TISSUES:  Normal      Impression: No acute intracranial abnormality  Workstation performed: [id number]     CT cervical spine without contrast    Result Date: 12/5/2022  Narrative: CT CERVICAL SPINE - WITHOUT CONTRAST INDICATION:   Fall with head impact  COMPARISON:  None  TECHNIQUE:  CT examination of the cervical spine was performed without intravenous contrast   Contiguous axial images were obtained  Sagittal and coronal reconstructions were performed  Radiation dose length product (DLP) for this visit:  386 mGy-cm   This examination, like all CT scans performed in the [facility], was performed utilizing techniques to minimize radiation dose exposure, including the use of iterative reconstruction and automated exposure control  IMAGE QUALITY:  Diagnostic  FINDINGS: ALIGNMENT:  Straightening of the normal cervical lordosis  Degenerative grade 1 retrolisthesis at C5-6 and grade 1 anterolisthesis at C7-T1  VERTEBRAL BODIES:  No fracture  DEGENERATIVE CHANGES:  Severe multilevel cervical degenerative changes are noted    No critical central canal stenosis  PREVERTEBRAL AND PARASPINAL SOFT TISSUES:  Unremarkable  THORACIC INLET:  Normal      Impression: No cervical spine fracture or traumatic malalignment  Workstation performed: [id number]           Available cardiology studies, imaging and lab results independently reviewed today  This note was completed in part utilizing m-modal fluency direct voice recognition software  Grammatical errors, random word insertion, spelling mistakes, occasional wrong word or "sound-alike" substitutions and incomplete sentences may be an occasional consequence of the system secondary to software limitations, ambient noise and hardware issues  At the time of dictation, efforts were made to edit, clarify and /or correct errors  Please read the chart carefully and recognize, using context, where substitutions have occurred  If you have any questions or concerns about the context, text or information contained within the body of this dictation, please contact myself, the provider, for further clarification

## 2022-12-06 NOTE — DISCHARGE INSTRUCTIONS
Scalp Laceration Care:   Seek medical attn if you develop fevers/chills/sweats or increased redness, swelling or drainage from the wound  Wash wound daily, gently with warm, soapy water  Do not scrub  Pat dry with clean towel  Do not immerse the wound in water (ie  No tub baths or swimming pools) until cleared by trauma

## 2022-12-06 NOTE — PROGRESS NOTES
[facility]  Progress Note - Virginia Favre 1945, 68 y o  male MRN: [id number]  Unit/Bed#: S -01 Encounter: [id number]  Primary Care Provider: Juanita De Luna MD   Date and time admitted to hospital: 12/5/2022  7:19 AM    MIAN (acute kidney injury) [facility])  Assessment & Plan  - Serum creatinine creatinine improved today to 1 26  - Patient with baseline serum creatinine 1 25  - Patient report of no imbibed fluids this morning prior workout; likely prerenal  - Avoid nephrotoxic medications  -Trend creatinine    Scalp laceration, initial encounter  Assessment & Plan  - Patient with scalp laceration status post fall  - Emergency physician with laceration closed; surgical glue  - Wound edges well approximated  - Will continue to monitor wound    Syncope and collapse  Assessment & Plan  - Patient with syncope and collapse  - Head CT negative for traumatic injury  - Troponins 38/45/52, ECG with normal sinus rhythm  - Cardiology consulted, appreciate input  - Continue on telemetry at this time  - Follow-up echocardiogram      DVT prophylaxis: SCDs and SQH  PT and OT: eval and treat    Disposition: Follow-up echocardiogram  Likely discharge today pending Cardiology  TRAUMA TERTIARY SURVEY NOTE    Code status:  Level 1 - Full Code    Consultants: IP CONSULT TO CARDIOLOGY    Subjective   Transfer from: not a transfer    Mechanism of Injury:Fall     Chief Complaint: "No new complaints "    HPI/Last 24 hour events: Patient reports that he is doing well at this time  States that his pain is well controlled  Objective   Vitals:   Temp:  [96 2 °F (35 7 °C)-98 5 °F (36 9 °C)] 97 9 °F (36 6 °C)  HR:  [58-75] 58  Resp:  [16-18] 16  BP: (134-149)/(66-95) 147/79    I/O     None           Physical Exam:   GENERAL APPEARANCE: NAD  NEURO: GCS 15   HEENT: scalp laceration is clean, dry, intact in the occipital region; repaired with glue    CV: RRR  LUNGS: CTA b/l  GI: non-tender, non-distended  : no rolle  MSK: moving all extremities  SKIN: warm, dry, intact    Invasive Devices     Peripheral Intravenous Line  Duration           Peripheral IV 12/06/22 Right Antecubital <1 day                   1  Before the illness or injury that brought you to the Emergency, did you need someone to help you on a regular basis? 0=No   2  Since the illness or injury that brought you to the Emergency, have you needed more help than usual to take care of yourself? 0=No   3  Have you been hospitalized for one or more nights during the past 6 months (excluding a stay in the Emergency Department)? 0=No   4  In general, do you see well? 0=Yes   5  In general, do you have serious problems with your memory? 0=No   6  Do you take more than three different medications everyday? 1=Yes   TOTAL   1     Did you order a geriatric consult if the score was 2 or greater?: yes         Lab Results:   Results: I have personally reviewed all pertinent laboratory/tests results, BMP/CMP:   Lab Results   Component Value Date    SODIUM 140 12/06/2022    K 4 3 12/06/2022     12/06/2022    CO2 26 12/06/2022    BUN 22 12/06/2022    CREATININE 1 26 12/06/2022    CALCIUM 9 2 12/06/2022    EGFR 54 12/06/2022    and CBC:   Lab Results   Component Value Date    WBC 9 16 12/06/2022    HGB 14 3 12/06/2022    HCT 43 2 12/06/2022    MCV 94 12/06/2022     12/06/2022    MCH 31 0 12/06/2022    MCHC 33 1 12/06/2022    RDW 13 6 12/06/2022    MPV 10 4 12/06/2022    NRBC 0 12/06/2022       Imaging Results: I have personally reviewed pertinent reports      Chest Xray(s): negative for acute findings   FAST exam(s): negative for acute findings   CT Scan(s): negative for acute findings   Additional Xray(s): negative for acute findings     Other Studies: no other studies

## 2022-12-06 NOTE — UTILIZATION REVIEW
Initial Clinical Review    Admission: Date/Time/Statement:   Admission Orders (From admission, onward)     Ordered        12/05/22 1415  Place in Observation  Once                      Orders Placed This Encounter   Procedures   • Place in Observation     Standing Status:   Standing     Number of Occurrences:   1     Order Specific Question:   Level of Care     Answer:   Med Surg [16]     ED Arrival Information     Expected   -    Arrival   12/5/2022 07:19    Acuity   Urgent            Means of arrival   Ambulance    Escorted by   [facility]    Service   Trauma    Admission type   Emergency            Arrival complaint   -           Chief Complaint   Patient presents with   • Syncope     Pt here by ems from [facility]  Patient was swimming in a warm pool this morning then took a hot shower and "woke up with people around him" does not remember falling   +headstrike w/ lac to back to head -thinners - asa        Initial Presentation: 68 y o  male PMH hypercholesteremia, hypertension to ED by EMS as Observation admit on  trauma service as a consult for ground level fall; syncope collapse, MIAN, elevated TROP  Reports  he went swimming at a local community pool and after swimming 48 laps, had a syncopal episode in the shower with headstrike  Affirms loss of consciousness,   EXAM   GCS 4/5/6=15;  scalp laceration  CT and cervical spine CT  negative for traumatic injury  Denies pain at this time or history of concussion, seizure or stroke  Reports that he had not eaten breakfast in am  nor had anything to drink prior to swimming  Neurovascularly intact with no focal neurological deficits at this time  Patient denies AC/AP  PLAN tele, cardiology consult orthostatic BP   Cardiology   Likely vasovagal event;  Overnight tele; echo for LV function; if echo wo abnormality plan OP Cardiology   Date:  12/6/2022   Day 2:     ED Triage Vitals [12/05/22 0721]   Temperature Pulse Respirations Blood Pressure SpO2   97 7 °F (36 5 °C) 84 16 124/92 99 %      Temp src Heart Rate Source Patient Position - Orthostatic VS BP Location FiO2 (%)   -- Monitor Lying Right arm --      Pain Score       3          Wt Readings from Last 1 Encounters:   12/05/22 89 6 kg (197 lb 8 5 oz)     Additional Vital Signs:   Date/Time Temp Pulse Resp BP MAP (mmHg) SpO2 O2 Device Patient Position - Orthostatic VS   12/06/22 08:36:09 -- 78 -- 161/99 120 98 % -- Standing for 3 minutes - Orthostatic VS   12/06/22 08:32:24 -- 79 -- 157/104 Abnormal  122 98 % -- Standing - Orthostatic VS   12/06/22 08:29:45 -- 64 -- 178/91 Abnormal  120 98 % -- Sitting - Orthostatic VS   12/06/22 08:27:05 -- 61 -- 157/90 112 99 % -- Lying - Orthostatic VS   12/06/22 07:31:44 97 9 °F (36 6 °C) 58 -- 147/79 102 98 % -- --   12/05/22 22:34:21 98 5 °F (36 9 °C) 61 16 139/91 107 97 % -- --   12/05/22 20:57:49 96 2 °F (35 7 °C) Abnormal  72 18 149/95 113 97 % -- --   12/05/22 1930 -- 65 18 139/66 95 97 % None (Room air) Sitting   12/05/22 1830 -- 60 18 144/75 104 -- -- Sitting   12/05/22 1800 -- 64 18 145/74 103 -- None (Room air) Sitting   12/05/22 1500 -- 71 18 148/77 108 -- -- Sitting   12/05/22 1430 -- 72 16 146/82 108 -- -- Sitting   12/05/22 1400 -- 67 16 143/70 101 -- None (Room air) Sitting   12/05/22 1330 -- 68 16 149/72 104 -- None (Room air) Sitting   12/05/22 1300 -- 69 18 146/71 102 -- -- Sitting   12/05/22 1200 -- 75 18 144/83 107 98 % -- --   12/05/22 1130 -- 60 18 141/73 102 99 % None (Room air) Sitting   12/05/22 1000 -- 62 18 139/73 100 98 % None (Room air) Sitting   12/05/22 0930 -- 62 -- 134/71 98 98 % -- --   12/05/22 0900 -- 63 -- 142/74 102 97 % -- --   12/05/22 0830 -- 66 -- 138/72 99 99 % -- --   12/05/22 0745 -- 78 -- 132/73 95 97 % -- --   12/05/22 0721 97 7 °F (36 5 °C) 84 16 124/92 104 99 % -- Lying     Weights (last 14 days)    Date/Time Weight Height   12/05/22 0721 89 6 kg (197 lb 8 5 oz) 6' 1" (1 854 m)       Pertinent Labs/Diagnostic Test Results:   12/5 ecg Sinus rhythm with 1st degree A-V block  When compared with ECG of 04-MAR-2019 12:24,  OK interval has increased    XR chest portable   Final Result by Mary Prieto MD (12/05 2212)      No acute cardiopulmonary disease  Workstation performed: [id number]         CT head without contrast   Final Result by Benjamín Rodríguez MD (12/05 0476)      No acute intracranial abnormality  Workstation performed: [id number]         CT cervical spine without contrast   Final Result by Benjamín Rodríguez MD (12/05 7425)      No cervical spine fracture or traumatic malalignment                     Workstation performed: [id number]               Results from last 7 days   Lab Units 12/06/22  0452 12/05/22  1444 12/05/22  0739   WBC Thousand/uL 9 16  --  6 83   HEMOGLOBIN g/dL 14 3  --  14 7   HEMATOCRIT % 43 2  --  44 4   PLATELETS Thousands/uL 271 239 308   NEUTROS ABS Thousands/µL 6 74  --  4 55         Results from last 7 days   Lab Units 12/06/22  0452 12/05/22  0739   SODIUM mmol/L 140 139   POTASSIUM mmol/L 4 3 4 3   CHLORIDE mmol/L 108 105   CO2 mmol/L 26 23   ANION GAP mmol/L 6 11   BUN mg/dL 22 20   CREATININE mg/dL 1 26 1 56*   EGFR ml/min/1 73sq m 54 42   CALCIUM mg/dL 9 2 9 3   MAGNESIUM mg/dL  --  1 9     Results from last 7 days   Lab Units 12/05/22  0739   AST U/L 21   ALT U/L 14   ALK PHOS U/L 54   TOTAL PROTEIN g/dL 6 3*   ALBUMIN g/dL 3 8   TOTAL BILIRUBIN mg/dL 1 29*         Results from last 7 days   Lab Units 12/06/22  0452 12/05/22  0739   GLUCOSE RANDOM mg/dL 95 164*             No results found for: BETA-HYDROXYBUTYRATE                   Results from last 7 days   Lab Units 12/05/22  1139 12/05/22  0951 12/05/22  0739   HS TNI 0HR ng/L  --   --  38   HS TNI 2HR ng/L  --  45  --    HSTNI D2 ng/L  --  7  --    HS TNI 4HR ng/L 52*  --   --    HSTNI D4 ng/L 14  --   --      Results from last 7 days   Lab Units 12/05/22  0739   D-DIMER QUANTITATIVE ug/ml FEU 0 63*         Results from last 7 days   Lab Units 12/05/22  0739   TSH 3RD GENERATON uIU/mL 6 571*                                                                                               ED Treatment:   Medication Administration from 12/05/2022 0719 to 12/05/2022 2050       Date/Time Order Dose Route Action     12/05/2022 0804 EST acetaminophen (TYLENOL) tablet 650 mg 650 mg Oral Given     12/05/2022 0806 EST sodium chloride 0 9 % bolus 1,000 mL 1,000 mL Intravenous New Bag     12/05/2022 1443 EST multi-electrolyte (ISOLYTE-S PH 7 4) bolus 500 mL 500 mL Intravenous New Bag     12/05/2022 1820 EST pravastatin (PRAVACHOL) tablet 80 mg 80 mg Oral Given     12/05/2022 1519 EST tetanus-diphtheria-acellular pertussis (BOOSTRIX) IM injection 0 5 mL 0 5 mL Intramuscular Given        Past Medical History:   Diagnosis Date   • Benign essential hypertension    • Broken ribs    • Chest pain      Present on Admission:  **None**      Admitting Diagnosis: Syncope and collapse [R55]  Weakness [R53 1]  Age/Sex: 68 y o  male  Admission Orders:  Tele  Orthostatic pressures    Scheduled Medications:  amLODIPine, 5 mg, Oral, Daily  ascorbic acid, 1,000 mg, Oral, Daily  cholecalciferol, 2,000 Units, Oral, Daily  enoxaparin, 30 mg, Subcutaneous, Q12H MILTON  losartan, 100 mg, Oral, Daily  multivitamin-minerals, 1 tablet, Oral, Daily  pravastatin, 80 mg, Oral, Daily With Dinner      Continuous IV Infusions:     PRN Meds:  acetaminophen, 650 mg, Oral, Q4H PRN  HYDROmorphone, 0 5 mg, Intravenous, Q1H PRN  naloxone, 0 04 mg, Intravenous, Q1MIN PRN  ondansetron, 4 mg, Intravenous, Q6H PRN  oxyCODONE, 10 mg, Oral, Q6H PRN  oxyCODONE, 5 mg, Oral, Q6H PRN  polyethylene glycol, 17 g, Oral, Daily PRN        IP CONSULT TO CARDIOLOGY    Network Utilization Review Department  ATTENTION: Please call with any questions or concerns to [phone] and carefully listen to the prompts so that you are directed to the right person  All voicemails are confidential   [facility] all requests for admission clinical reviews, approved or denied determinations and any other requests to dedicated fax number below belonging to the campus where the patient is receiving treatment   List of dedicated fax numbers for the Facilities:  [address] DENIALS (Administrative/Medical Necessity) [phone]   [address] (Maternity/NICU/Pediatrics) [phone]   [address] [phone]   [facility] Michelle  [phone]   [address] [address] [facility]ivasMat[facility]inally [facility]ane[facility] 28 [phone]   [address] Caitlyn Collier [facility] 134 815 [facility] [phone]

## 2022-12-06 NOTE — Clinical Note
Pt is a 68 y o  male seen for OT evaluation s/p admission to [address] [place] [address] on 12/5/2022 as a trauma following an episode of syncope and a fall  Pt diagnosed with Syncope and collapse  Pt has a significant PMH impacting occupational performance including: HTN, s/p lumbar laminectomy, CKD, and scapholunate ligament tear    Pt with no recent admissions in the last 2 months  Pt with active OT evaluation and treatment orders and activity orders  Pt lives in a [address] [place] [address] w/ his wife w/ 4-5 CAR  Pt is motivated to return home  Personal and environmental factors supporting pt at time of IE include soial support, attitude towards recovery, and current physical activity habits  Personal and environmental factors inhibiting engagement in occupations include advanced age and current habit of not eating before exercising  Pt agreeable and willing to participate in OT evaluation  During evaluation, pt was I for eating and mod I for grooming, UB dressing and bed mobility  Pt required S for LB dressing, sit >< stand transfers, and fxal mobility w/out an AD  Pt performing ADLs at/near baseline level of independence, and no further acute OT needs identified at this time  Recommend continued active ADL participation and mobilization with hospital staff while in the hospital to increase pt’s endurance and strength upon D/C  From OT standpoint, recommend D/C to home with family support when medically cleared  D/C pt from OT caseload at this time

## 2022-12-06 NOTE — DISCHARGE SUMMARY
Discharge Summary - Sarah Parmar 68 y o  male MRN: [id number]    Unit/Bed#: S -01 Encounter: [id number]    Admission Date:   Admission Orders (From admission, onward)     Ordered        12/05/22 1415  Place in Observation  Once                        Admitting Diagnosis: Syncope and collapse [R55]  Weakness [R53 1]    HPI: Per Jared Gamino, "Sarah Parmar is a 68 y o  male with history of hypercholesteremia, hypertension, CKD, that presents to the trauma service as a consult for ground level fall; syncope collapse  Patient reports that he went swimming at a local community pool and after swimming 48 laps, he had a syncopal episode in the shower with headstrike  Patient affirms loss of consciousness, "the next thing I remember people were standing over me "  Patient scalp laceration was closed via surgical glue by ED physician  Patient head CT and cervical spine CT were negative for traumatic injury  Patient denies pain at this time  Patient denies history of concussion, seizure and denies history of stroke  Patient also reports that “3-4 years ago when I was shoveling my Dr Valentina Prieto had the same thing happened to me, they told me it was dehydration "  Patient also reports that he had not eaten breakfast this morning nor had anything to drink prior to swimming  Patient is neurovascularly intact with no focal neurological deficits at this time  Patient denies AC/AP  Patient reports taking all prescribed medication daily and denies recent ETOH or illicit drug use  Patient denies nausea, vomiting and diarrhea symptoms  Patient denies chest pain, shortness of breath, and abdominal pain "    Procedures Performed:   Orders Placed This Encounter   Procedures   • ED ECG Documentation Only   • Laceration repair       Summary of Hospital Course:  Patient is a 59-year-old male comes in after syncopal episode in the shower at his gym  He was noted have a scalp laceration which was repaired at bedside    Patient was admitted and monitored on the floor  Cardiology was consulted  Syncope workup unremarkable  He would follow up outpatient with the cardiology team   Echocardiogram done and completed with no irregularities  Patient given outpatient follow-up with his PCP and cardiologist   Did not require trauma follow-up  No other injuries were noted  Patient cleared PT and OT  Significant Findings, Care, Treatment and Services Provided:   XR chest portable    Result Date: 12/5/2022  Impression: No acute cardiopulmonary disease  Workstation performed: [id number]     CT head without contrast    Result Date: 12/5/2022  Impression: No acute intracranial abnormality  Workstation performed: [id number]     CT cervical spine without contrast    Result Date: 12/5/2022  Impression: No cervical spine fracture or traumatic malalignment    Workstation performed: [id number]       Complications: no complications    Discharge Diagnosis:   Patient Active Problem List   Diagnosis   • Abnormal findings on diagnostic imaging of other specified body structures   • Allergic rhinitis   • Benign enlargement of prostate   • Benign essential hypertension   • Osteoarthritis   • HNP (herniated nucleus pulposus), lumbar   • Lumbar radiculopathy   • S/P lumbar laminectomy   • Actinic keratoses   • Left leg weakness   • Syncope and collapse   • Left shoulder pain   • Hypercholesterolemia   • Stenosis of left carotid artery   • Stage 2 chronic kidney disease   • Achilles tendinitis of right lower extremity   • Intrinsic eczema   • Vitamin D deficiency   • Tear of scapholunate ligament   • Hyperglycemia   • Stage 3a chronic kidney disease (HCC)   • Scalp laceration, initial encounter   • MIAN (acute kidney injury) [facility])         Medical Problems     Resolved Problems  Date Reviewed: 12/6/2022   None         Condition at Discharge: good         Discharge instructions/Information to patient and family:   See after visit summary for information provided to patient and family  Provisions for Follow-Up Care:  See after visit summary for information related to follow-up care and any pertinent home health orders  PCP: Nurys Tubbs MD    Disposition: Home    Planned Readmission: No      Discharge Statement   I spent 23 minutes discharging the patient  This time was spent on the day of discharge  I had direct contact with the patient on the day of discharge  Additional documentation is required if more than 30 minutes were spent on discharge  Discharge Medications:  See after visit summary for reconciled discharge medications provided to patient and family

## 2022-12-06 NOTE — OCCUPATIONAL THERAPY NOTE
Occupational Therapy Evaluation     Patient Name: Katerina Acevedo  BFQRI'M Date: 12/6/2022  Problem List  Principal Problem:    Syncope and collapse  Active Problems:    Scalp laceration, initial encounter    MIAN (acute kidney injury) [facility])    Past Medical History  Past Medical History:   Diagnosis Date    Benign essential hypertension     Broken ribs     Chest pain      Past Surgical History  Past Surgical History:   Procedure Laterality Date    APPENDECTOMY      LUMBAR LAMINECTOMY Bilateral 04/2018    TONSILLECTOMY          12/06/22 0820   OT Last Visit   OT Visit Date 12/06/22  (Tuesday)   Note Type   Note type Evaluation   Pain Assessment   Pain Assessment Tool 0-10   Pain Score No Pain   Restrictions/Precautions   Weight Bearing Precautions Per Order No   Other Precautions Chair Alarm; Bed Alarm;Telemetry; Fall Risk   Home Living   Type of [address] [place] [address] Two level; Other (Comment)  (0 CAR from garage, 4-5 front door)   Bathroom Shower/Tub Walk-in shower  (on main / lower level; tub shower upstairs)   Bathroom Toilet Raised   Bathroom Equipment Other (Comment)  (no DME)   [address]; Other (Comment)  (for community mobility; "when I have to walk distances)   Additional Comments Pt reports living w/ wife in [address] [place] [address]   Prior Function   Level of [place] Independent with ADLs   Lives With Spouse  (pt reports wife does not drive)   IADLs Independent with driving; Independent with meal prep; Independent with medication management   Falls in the last 6 months 1 to 4  (1 traumatic (passed out) leading to admission, 1  6 month ago (tripped on stairs), similar event few years ago while shoveling snow)   Vocational Retired   Lifestyle   Autonomy Pt reports I w/ ADLs, IADLs, and fxla mobility w/out AD     Reciprocal Relationships Pt reports living w/ wife   Service 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[address], [address] [employer])  Pt also reports working part-time as a  for local sports teams  Intrinsic Gratification Pt enjoys swimming 3X a week and [employer] football  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 [employer] Present No   Subjective   Subjective pt prefers "Emmy Regulus"   ADL   Eating Assistance 7  Independent   Grooming Assistance 6  Modified Independent   Grooming Deficit Setup; Increased time to complete   UB Bathing Assistance Unable to assess   LB Bathing Assistance Unable to assess   UB Dressing Assistance 6  Modified independent   UB Dressing Deficit Setup; Increased time to complete   LB Dressing Assistance 5  Supervision/Setup  (Pt was able to thread B LE into pants and pull them over his hips without assistance or steadying )   LB Dressing Deficit Setup; Increased time to complete   Toileting Assistance  Unable to assess   Bed Mobility   Supine to Sit 6  Modified independent  (to pt's L)   Additional items HOB elevated; Increased time required   Additional Comments Pt seated OOB in chair at end of OT eval with needs, call bell in reach, and chair alarm activated  Transfers   Sit to Stand 5  Supervision   Additional items Assist x 1; Increased time required   Stand to Sit 5  Supervision   Additional items Assist x 1; Armrests; Increased time required   Additional Comments Pt reported feeling dizzy/lightheaded upon standing  Functional Mobility   Functional Mobility 5  Supervision   Additional Comments Pt engaged in fxal mobility w/ S w/out any AD     Additional items   (no AD)   Balance   Static Sitting Good   Static Standing Fair   Ambulatory Fair   Activity Tolerance   Activity Tolerance Patient tolerated treatment well   Nurse Made Aware per RN, Meena Martini, appropriate to see pt   RUE Assessment   RUE Assessment WFL   RUE Strength   RUE Overall Strength Within Functional Limits - able to perform ADL tasks with strength   LUE Assessment   LUE Assessment WFL   LUE Strength   LUE Overall Strength Within Functional Limits - able to perform ADL tasks with strength   Hand Function   Gross Motor Coordination Functional   Fine Motor Coordination Functional   Hand Function Comments Pt reports L hand dominance  Cognition   Overall Cognitive Status WFL   Arousal/Participation Alert; Responsive; Cooperative   Attention Within functional limits   Orientation Level Oriented to person;Oriented to place;Oriented to situation  (Pt is generally oriented to time (time of day)  )   Memory Within functional limits   Following Commands Follows all commands and directions without difficulty   Comments Pt was identified by full name and   Pt was agreeable and willing to participate in OT eval  Pt was plesant and able to participate in conversation with therapist  Pt was able to follow directions and express his wants and needs effectively  Assessment   Assessment Pt is a 68 y o  male seen for OT evaluation s/p admission to [address] [place] [address] on 2022 as a trauma following an episode of syncope and a fall  Pt diagnosed with Syncope and collapse  Pt has a significant PMH impacting occupational performance including: HTN, s/p lumbar laminectomy, CKD, and scapholunate ligament tear    Pt with no recent admissions in the last 2 months  Pt with active OT evaluation and treatment orders and activity orders  Pt lives in a [address] [place] [address] w/ his wife - CAR  Pt is motivated to return home  Personal and environmental factors supporting pt at time of IE include soial support, attitude towards recovery, and current physical activity habits  Personal and environmental factors inhibiting engagement in occupations include advanced age and current habit of not eating before exercising  Pt agreeable and willing to participate in OT evaluation  During evaluation, pt was I for eating and mod I for grooming, UB dressing and bed mobility   Pt required S for LB dressing, sit >< stand transfers, and fxal mobility w/out an AD  Pt performing ADLs at/near baseline level of independence, and no further acute OT needs identified at this time  Recommend continued active ADL participation and mobilization with hospital staff while in the hospital to increase pt’s endurance and strength upon D/C  From OT standpoint, recommend D/C to home with family support when medically cleared  D/C pt from OT caseload at this time  Goals   Patient Goals to go home today   Plan   OT Frequency Eval only   Recommendation   OT Discharge Recommendation No rehabilitation needs   AM-PAC Daily Activity Inpatient   Lower Body Dressing 4   Bathing 3   Toileting 3   Upper Body Dressing 4   Grooming 4   Eating 4   Daily Activity Raw Score 22   Daily Activity Standardized Score (Calc for Raw Score >=11) 47  1   AM-PAC Applied Cognition Inpatient   Following a Speech/Presentation 4   Understanding Ordinary Conversation 4   Taking Medications 4   Remembering Where Things Are Placed or Put Away 4   Remembering List of 4-5 Errands 4   Taking Care of Complicated Tasks 4   Applied Cognition Raw Score 24   Applied Cognition Standardized Score 62 21   Barthel Index   Feeding 10   Bathing 0   Grooming Score 5   Dressing Score 10   Bladder Score 10   Bowels Score 10   Toilet Use Score 10   Transfers (Bed/Chair) Score 15   Mobility (Level Surface) Score 0   Stairs Score 0   Barthel Index Score 70   Modified Deepti Scale   Modified Deepti Scale 1     Vitals:    12/06/22 0829 12/06/22 0832 12/06/22 0836 12/06/22 1000   BP: (!) 178/91 (!) 157/104 161/99 161/99   Pulse: 64 79 78 58   Patient Position - Orthostatic VS: Sitting - Orthostatic VS Standing - Orthostatic VS Standing for 3 minutes - Orthostatic VS      The patient's raw score on the AM-PAC Daily Activity inpatient short form is 22, standardized score is 47 1, greater than 39 4  Patients at this level are likely to benefit from discharge to home   Please refer to the recommendation of the Occupational Therapist for safe discharge planning      Raghav Brown, OTS

## 2022-12-08 ENCOUNTER — TRANSITIONAL CARE MANAGEMENT (OUTPATIENT)
Dept: FAMILY MEDICINE CLINIC | Facility: CLINIC | Age: 77
End: 2022-12-08

## 2022-12-12 ENCOUNTER — OFFICE VISIT (OUTPATIENT)
Dept: FAMILY MEDICINE CLINIC | Facility: CLINIC | Age: 77
End: 2022-12-12

## 2022-12-12 VITALS
SYSTOLIC BLOOD PRESSURE: 130 MMHG | HEIGHT: 73 IN | WEIGHT: 195 LBS | BODY MASS INDEX: 25.84 KG/M2 | DIASTOLIC BLOOD PRESSURE: 72 MMHG | HEART RATE: 64 BPM

## 2022-12-12 DIAGNOSIS — I10 HYPERTENSION, UNSPECIFIED TYPE: ICD-10-CM

## 2022-12-12 DIAGNOSIS — R55 SYNCOPE, UNSPECIFIED SYNCOPE TYPE: Primary | ICD-10-CM

## 2022-12-12 DIAGNOSIS — S06.0X0A CONCUSSION WITHOUT LOSS OF CONSCIOUSNESS, INITIAL ENCOUNTER: ICD-10-CM

## 2022-12-12 RX ORDER — AMLODIPINE BESYLATE 5 MG/1
5 TABLET ORAL
Qty: 90 TABLET | Refills: 3
Start: 2022-12-12

## 2022-12-12 NOTE — PROGRESS NOTES
TCM Call     Date and time call was made  12/8/2022  5:06 PM    Hospital care reviewed  Records reviewed    Patient was hospitialized at  [address] [place] [address]    Date of Admission  12/05/22    Date of discharge  12/06/22    Diagnosis  Syncope & collapse    Disposition  Home    Were the patients medications reviewed and updated  Yes    Current Symptoms  None    Fatigue severity  Moderate      TCM Call     Post hospital issues  None    Should patient be enrolled in anticoag monitoring? No    Scheduled for follow up? Yes    Did you obtain your prescribed medications  Yes    Do you need help managing your prescriptions or medications  No    Is transportation to your appointment needed  No    I have advised the patient to call PCP with any new or worsening symptoms  JJ Martin    Living Arrangements  Spouse or Significiant other    Are you recieving any outpatient services  No    Are you recieving home care services  No    Are you using any community resources  No    Current waiver services  No    Have you fallen in the last 12 months  Yes    How many times  1    Comments  scheduled with Dr Mireya Scales 3/13          Assessment/Plan:       Problem List Items Addressed This Visit    None  Visit Diagnoses     Syncope, unspecified syncope type    -  Primary    Hypertension, unspecified type        Relevant Medications    amLODIPine (NORVASC) 5 mg tablet    Concussion without loss of consciousness, initial encounter              1  Syncope, unspecified syncope type  Will continue on same medication and change amlodipine to night, patient states hospital doctors recommended eating power bar prior to swimming which he is planning to do  If light headiness continues consider decreasing losartan to 50mg, some of these symptoms may be concussion due to head injury  2  Hypertension, unspecified type    - amLODIPine (NORVASC) 5 mg tablet; Take 1 tablet (5 mg total) by mouth daily at bedtime  Dispense: 90 tablet;  Refill: 3    3  Concussion without loss of consciousness, initial encounter  Advised rest, follow up if not improving  Subjective:      Patient ID: Trang Hunt is a 68 y o  male  HPI    68year old male with pmh of CKD, HTN, presenting today for hospital follow up  He had an episdoe of syncope after swimming at pool, he notes he took his BP meds then went swimming and while showering he had an episode of syncope hit his head, and woke up with paremeidcs, he was hypotensive to 80/50 at this time  He was taken to the ER, had a normal ECHO and was discharged with cardiology follow up  Seeing Cardiology 12/13  Since hospital has had headache and bothered by light  Creatine 1 56 on admission, 1 26 every day  Since discharge he has noted light headiness when standing up quickly took BP at home was 140/78      The following portions of the patient's history were reviewed and updated as appropriate: allergies, current medications, past family history, past medical history, past social history, past surgical history and problem list       Current Outpatient Medications:   •  amLODIPine (NORVASC) 5 mg tablet, Take 1 tablet (5 mg total) by mouth daily at bedtime, Disp: 90 tablet, Rfl: 3  •  ascorbic acid (VITAMIN C) 1000 MG tablet, Take 1,000 mg by mouth, Disp: , Rfl:   •  cetirizine (ZyrTEC) 10 mg tablet, Take 1 tablet by mouth, Disp: , Rfl:   •  Cholecalciferol (Vitamin D3) 50 MCG (2000 UT) capsule, Take 1 capsule (2,000 Units total) by mouth daily, Disp: , Rfl: 0  •  DAILY MULTIPLE VITAMINS tablet, Take 1 tablet by mouth daily, Disp: , Rfl:   •  Diclofenac Sodium (VOLTAREN) 1 %, Apply 2 g topically 4 (four) times a day, Disp: 2 g, Rfl: 2  •  fluticasone (FLONASE) 50 mcg/act nasal spray, SHAKE LIQUID AND USE 2 SPRAYS IN EACH NOSTRIL DAILY, Disp: 48 g, Rfl: 11  •  glucosamine-chondroitin 500-400 MG tablet, Take 1 tablet by mouth daily, Disp: , Rfl:   •  hydrocortisone 2 5 % cream, Apply topically 2 (two) times a day as needed for rash, Disp: 30 g, Rfl: 5  •  losartan (COZAAR) 100 MG tablet, Take 1 tablet (100 mg total) by mouth daily, Disp: 90 tablet, Rfl: 3  •  rosuvastatin (CRESTOR) 10 MG tablet, Take 1 tablet (10 mg total) by mouth daily, Disp: 90 tablet, Rfl: 3     Review of Systems   Constitutional: Negative for activity change and appetite change  Respiratory: Negative for apnea and chest tightness  Gastrointestinal: Negative for abdominal distention and abdominal pain  Musculoskeletal: Negative for arthralgias and back pain  Objective:      /72 (BP Location: Left arm, Patient Position: Sitting, Cuff Size: Adult)   Pulse 64   Ht 6' 1" (1 854 m)   Wt 88 5 kg (195 lb)   BMI 25 73 kg/m²          Physical Exam  Constitutional:       Appearance: Normal appearance  Cardiovascular:      Rate and Rhythm: Normal rate and regular rhythm  Pulses: Normal pulses  Heart sounds: Normal heart sounds  Pulmonary:      Effort: Pulmonary effort is normal       Breath sounds: Normal breath sounds  Abdominal:      General: Abdomen is flat  Palpations: Abdomen is soft  Tenderness: There is no abdominal tenderness  Neurological:      Mental Status: He is alert             Sylvia Elliott MD

## 2022-12-12 NOTE — PATIENT INSTRUCTIONS
1  Syncope, unspecified syncope type    2  Hypertension, unspecified type  -     amLODIPine (NORVASC) 5 mg tablet; Take 1 tablet (5 mg total) by mouth daily at bedtime     Discuss with your cardiologist reducing lisinopril 50mg if the light headed feeling does not resolve      Take your amlodipine at night from now on

## 2022-12-12 NOTE — PROGRESS NOTES
Cardiology  Hospital Follow Up   Office Visit Note  Mohamud Islas   68 y o    male   MRN: [id number]  [address]    [address] [address] [phone]    PCP: Lars Chakraborty MD  Cardiologist: Dr Manuela Ramos            Summary of recommendations  Heart healthy diet  Educational information provided  Encouraged hydration  Periodic home blood pressure monitoring  Goal: < 130/80  He is to give us a call let us know how his blood pressures have been running  His machine of note, has been validated  For prevention: Agree with adjustment of his antihypertensives per his PCP, amlodipine nightly, losartan in the morning  Follow up will be scheduled with Dr Kayli Harden screenin/15/2020, up-to-date      Assessment/plan  Vasovagal syncope recent hospital adm -22,   He had syncope in the shower at his gym  Work-up unremarkable  Followed by Cardiology as an IP  Recommend he adjust the timing of his antihypertensives as above  Hypertension, essential   /87 losartan 100 mg daily, amlodipine 5 mg nightly  He did have a couple cups of coffee this morning  He admits his blood pressure is usually a little higher in the office due to some mild anxiety  He will check his blood pressures at home, and give us a call in a few weeks  Hyperlipidemia, on rosuvastatin 10 mg daily   Lipid panel 2022 , TG 76, HDL 62, LDL 76  CKD 3A  Baseline creatinine 1 2  Carotid artery stenosis CUS 21: less than 50% ICA stenoses bilateral  Cardiac testing  · EST 18 Normal study after maximal exercise with reproduction of symptoms  No evidence of inducible ischemia by EKG, but patient did report some chest discomfort with exercise  · TTE  3/5/2019: EF 55%, mild LVH, G1DD, normal RVSF, no significant valvular abnormality  • TTE 22  EF 60%  Grade 1 DD  Mild ARON  Mild MR    Mild TR             HPI  Nicky Bunch is a 67 yo male with essential hypertension, hyperlipidemia, and chronic kidney disease  He follows with Dr Hilda Holcomb  He was seen in the office in 2018 for atypical chest pain maintenance cardiac medications include amlodipine 5 mg daily, losartan 100 mg daily and crestor 10 mg daily  Adm 12/5-12/6/22  CC: Fall syncope  He was swimming at a local pool  After swimming 48 laps he had a syncopal episode in the shower with head strike  He had+ LOC  Systolic blood pressure was 80 on arrival   Creatinine 1 56 (above baseline of 1 2)  Given a liter of fluids  Dx: Syncope  Sustained a scalp laceration related to syncope  CTH and cervical spine CT: Negative for traumatic injury  Echocardiogram: EF 60%  Grade 1 DD  No significant valvular heart disease  Troponins negative  EKG normal sinus rhythm, first-degree AV block  No acute ischemic changes  Followed by Cardiology  Patient reported he took his antihypertensives prior to swimming  He usually takes them after he swims  Vasovagal syncope suspected      12/12/22  Hospital follow-up; vasovagal syncope  Review of systems: He denies chest pain, shortness of breath, lightheadedness  He does have a headache, which is improving, and has some sensitivity to light  Occasionally he gets dizzy with position changes  He is hydrating  He has a posterior scalp laceration that is improving  He has not been back to the pool yet  He is giving it another week or so, until the adhesive over his laceration has had some additional healing time  Exam otherwise unremarkable  Blood pressure 151/87  I encouraged hydration  I recommend he monitor his blood pressure periodically at home a couple times of day throughout the day  I gave him a home blood pressure sheet  He was advised to give us a call in a couple weeks with some home blood pressure readings  Goal blood pressure less than 130/80    His home blood pressure machine has been validated      Assessment  Diagnoses and all orders for this visit:    Hospital discharge follow-up    Syncope and collapse    Benign essential hypertension    Stenosis of left carotid artery    Stage 3a chronic kidney disease (HCC)          Past Medical History:   Diagnosis Date   • Benign essential hypertension    • Broken ribs    • Chest pain        Review of Systems   Constitutional: Negative for chills  Cardiovascular: Negative for chest pain, claudication, cyanosis, dyspnea on exertion, irregular heartbeat, leg swelling, near-syncope, orthopnea, palpitations, paroxysmal nocturnal dyspnea and syncope  Respiratory: Negative for cough and shortness of breath  Gastrointestinal: Negative for heartburn and nausea  Neurological: Positive for dizziness  Negative for focal weakness, headaches, light-headedness and weakness  Mild posterior headache  Photophobia  All other systems reviewed and are negative  Allergies   Allergen Reactions   • Diazepam      Increased anger  • Gabapentin Other (See Comments)     Severe mood swings, anger           Current Outpatient Medications:   •  amLODIPine (NORVASC) 5 mg tablet, Take 1 tablet (5 mg total) by mouth daily at bedtime, Disp: 90 tablet, Rfl: 3  •  ascorbic acid (VITAMIN C) 1000 MG tablet, Take 1,000 mg by mouth, Disp: , Rfl:   •  cetirizine (ZyrTEC) 10 mg tablet, Take 1 tablet by mouth, Disp: , Rfl:   •  Cholecalciferol (Vitamin D3) 50 MCG (2000 UT) capsule, Take 1 capsule (2,000 Units total) by mouth daily, Disp: , Rfl: 0  •  DAILY MULTIPLE VITAMINS tablet, Take 1 tablet by mouth daily, Disp: , Rfl:   •  Diclofenac Sodium (VOLTAREN) 1 %, Apply 2 g topically 4 (four) times a day, Disp: 2 g, Rfl: 2  •  fluticasone (FLONASE) 50 mcg/act nasal spray, SHAKE LIQUID AND USE 2 SPRAYS IN EACH NOSTRIL DAILY, Disp: 48 g, Rfl: 11  •  glucosamine-chondroitin 500-400 MG tablet, Take 1 tablet by mouth daily, Disp: , Rfl:   •  hydrocortisone 2 5 % cream, Apply topically 2 (two) times a day as needed for rash, Disp: 30 g, Rfl: 5  •  losartan (COZAAR) 100 MG tablet, Take 1 tablet (100 mg total) by mouth daily, Disp: 90 tablet, Rfl: 3  •  rosuvastatin (CRESTOR) 10 MG tablet, Take 1 tablet (10 mg total) by mouth daily, Disp: 90 tablet, Rfl: 3        Social History     Socioeconomic History   • Marital status: /Civil Union     Spouse name: Not on file   • Number of children: Not on file   • Years of education: Not on file   • Highest education level: Not on file   Occupational History   • Not on file   Tobacco Use   • Smoking status: Former     Types: Cigarettes     Quit date: 56     Years since quittin 9   • Smokeless tobacco: Never   Vaping Use   • Vaping Use: Never used   Substance and Sexual Activity   • Alcohol use: Yes     Comment: Rare   • Drug use: Not Currently   • Sexual activity: Not on file   Other Topics Concern   • Not on file   Social History Narrative   • Not on file     Social Determinants of Health     Financial Resource Strain: Not on file   Food Insecurity: Not on file   Transportation Needs: Not on file   Physical Activity: Not on file   Stress: Not on file   Social Connections: Not on file   Intimate Partner Violence: Not on file   Housing Stability: Not on file       Family History   Problem Relation Age of Onset   • Colon cancer Mother    • Cancer Father    • Thyroid cancer Father    • Other Father         MOTOR VEHICLE TRAFFIC ACCIDENT   • Ovarian cancer Sister        Physical Exam  Vitals and nursing note reviewed  Constitutional:       General: He is not in acute distress  HENT:      Head: Normocephalic and atraumatic  Eyes:      Conjunctiva/sclera: Conjunctivae normal    Cardiovascular:      Rate and Rhythm: Normal rate and regular rhythm  Pulses: Intact distal pulses  Heart sounds: Normal heart sounds  Pulmonary:      Effort: Pulmonary effort is normal       Breath sounds: Normal breath sounds  Abdominal:      General: Bowel sounds are normal       Palpations: Abdomen is soft  Musculoskeletal:         General: Normal range of motion  Cervical back: Normal range of motion and neck supple  Skin:     General: Skin is warm and dry  Comments: Adhesive intact over posterior scalp laceration   Neurological:      Mental Status: He is alert and oriented to person, place, and time  Vitals: There were no vitals taken for this visit  Wt Readings from Last 3 Encounters:   22 88 5 kg (195 lb)   22 89 4 kg (197 lb)   10/03/22 90 4 kg (199 lb 6 4 oz)         Labs & Results:  Lab Results   Component Value Date    WBC 9 16 2022    HGB 14 3 2022    HCT 43 2 2022    MCV 94 2022     2022     No results found for: BNP  No components found for: CHEM  Troponin I   Date Value Ref Range Status   2019 <0 02 <=0 04 ng/mL Final     Comment:       S[employer] Chemistry analyzer 99% cutoff is > 0 04 ng/mL in network labs     o cTnI 99% cutoff is useful only when applied to patients in the clinical setting of myocardial ischemia   o cTnI 99% cutoff should be interpreted in the context of clinical history, ECG findings and possibly cardiac imaging to establish correct diagnosis  o cTnI 99% cutoff may be suggestive but clearly not indicative of a coronary event without the clinical setting of myocardial ischemia  Results for orders placed during the hospital encounter of 19    Echo complete with contrast if indicated    Narrative  [facility] [address] [place] [address]  [phone]    Transthoracic Echocardiogram  2D, M-mode, Doppler, and Color Doppler    Study date:  05-Mar-2019    Patient: Radha Belcher  MR number: [id number]  Account number: [de-identified]  : 1945  Age: 68 years  Gender: Male  Status: Outpatient  Location: Bedside  Height: 73 in  Weight: 215 lb  BP: 147/ 90 mmHg    Indications: Syncope      Diagnoses: R55  - Syncope and collapse    Sonographer:  Tomeka Kapadia [facility]  Primary Physician:  Miley Pineda MD  Referring Physician:  Rosalind Santamaria DO  Group:  [facility]  Interpreting Physician:  Connie Mcrae MD    SUMMARY    LEFT VENTRICLE:  Size was normal   Systolic function was normal  Ejection fraction was estimated to be 55 %  There was mild concentric hypertrophy  Doppler parameters were consistent with abnormal left ventricular relaxation (grade 1 diastolic dysfunction)  RIGHT VENTRICLE:  The size was normal   Systolic function was normal     TRICUSPID VALVE:  There was trace regurgitation  COMPARISONS:  There has been no significant interval change  Comparison was made with the previous study of 26-Feb-2018  HISTORY: PRIOR HISTORY: Seizure, chest pain, hypertension  Family history of cancer  PROCEDURE: The procedure was performed at the bedside  This was a routine study  The transthoracic approach was used  The study included complete 2D imaging, M-mode, complete spectral Doppler, and color Doppler  The heart rate was 63 bpm,  at the start of the study  LEFT VENTRICLE: Size was normal  Systolic function was normal  Ejection fraction was estimated to be 55 %  There were no regional wall motion abnormalities  Wall thickness was mildly increased  There was mild concentric hypertrophy  DOPPLER: Doppler parameters were consistent with abnormal left ventricular relaxation (grade 1 diastolic dysfunction)  RIGHT VENTRICLE: The size was normal  Systolic function was normal  Wall thickness was normal     LEFT ATRIUM: Size was normal     RIGHT ATRIUM: Size was normal     MITRAL VALVE: Valve structure was normal  There was normal leaflet separation  DOPPLER: The transmitral velocity was within the normal range  There was no evidence for stenosis  There was no regurgitation  AORTIC VALVE: The valve was trileaflet  Leaflets exhibited normal thickness and normal cuspal separation   DOPPLER: Transaortic velocity was within the normal range  There was no evidence for stenosis  There was no regurgitation  TRICUSPID VALVE: The valve structure was normal  There was normal leaflet separation  DOPPLER: The transtricuspid velocity was within the normal range  There was no evidence for stenosis  There was trace regurgitation  The tricuspid jet  envelope definition was inadequate for estimation of RV systolic pressure  There are no indirect findings suggestive of moderate or severe pulmonary hypertension  PULMONIC VALVE: Leaflets exhibited normal thickness, no calcification, and normal cuspal separation  DOPPLER: The transpulmonic velocity was within the normal range  There was no regurgitation  PERICARDIUM: There was no pericardial effusion  The pericardium was normal in appearance  AORTA: The root exhibited normal size  SYSTEMIC VEINS: IVC: The inferior vena cava was normal in size and course  Respirophasic changes were normal     SYSTEM MEASUREMENT TABLES    2D mode  Aortic Root Diameter; User chosen value; 2D mode;: 3 5 cm  LA/Ao (2D): 1 14  Left Atrium Paul-posterior Systolic Dimension; User chosen value; 2D mode;: 4 cm  EDV (2D-Cubed): 186 cm3  EF (2D-Cubed): 71 9 %  ESV (2D-Cubed): 52 3 cm3  FS (2D-Cubed): 34 5 %  FS (2D-Teich): 34 5 %  IVS/LVPW (2D): 0 82  Interventricular Septum Diastolic Thickness; User chosen value; 2D mode;: 1 09 cm  Left Ventricle Internal End Diastolic Dimension; User chosen value; 2D mode;: 5 71 cm  Left Ventricle Internal Systolic Dimension; User chosen value; 2D mode;: 3 74 cm  Left Ventricle Posterior Wall Diastolic Thickness; User chosen value; 2D mode;: 1 33 cm  Left Ventricular Ejection Fraction; Teichholz; 2D mode;: 63 %  Left Ventricular End Diastolic Volume; Teichholz; 2D mode;: 161 cm3  Left Ventricular End Systolic Volume; Teichholz; 2D mode;: 59 6 cm3  SV (2D-Cubed): 134 cm3  Stroke Volume; Teichholz; 2D mode;: 101 cm3    Apical four chamber  Left Atrium MOD Diam; Most recent value chosen;  End Systole; Apical four chamber;: 2 07 cm  Left Atrium MOD Diam; Most recent value chosen; End Systole; Apical four chamber;: 3 07 cm  Left Atrium MOD Diam; Most recent value chosen; End Systole; Apical four chamber;: 3 92 cm  Left Atrium MOD Diam; Most recent value chosen; End Systole; Apical four chamber;: 3 95 cm  Left Atrium MOD Diam; Most recent value chosen; End Systole; Apical four chamber;: 3 95 cm  Left Atrium MOD Diam; Most recent value chosen; End Systole; Apical four chamber;: 3 82 cm  Left Atrium MOD Diam; Most recent value chosen; End Systole; Apical four chamber;: 1 55 cm  Left Atrium MOD Diam; Most recent value chosen; End Systole; Apical four chamber;: 1 94 cm  Left Atrium MOD Diam; Most recent value chosen; End Systole; Apical four chamber;: 2 2 cm  Left Atrium MOD Diam; Most recent value chosen; End Systole; Apical four chamber;: 2 46 cm  Left Atrium MOD Diam; Most recent value chosen; End Systole; Apical four chamber;: 2 58 cm  Left Atrium MOD Diam; Most recent value chosen; End Systole; Apical four chamber;: 2 81 cm  Left Atrium MOD Diam; Most recent value chosen; End Systole; Apical four chamber;: 3 1 cm  Left Atrium MOD Diam; Most recent value chosen; End Systole; Apical four chamber;: 3 3 cm  Left Atrium MOD Diam; Most recent value chosen; End Systole; Apical four chamber;: 3 5 cm  Left Atrium MOD Diam; Most recent value chosen; End Systole; Apical four chamber;: 3 56 cm  Left Atrium MOD Diam; Most recent value chosen; End Systole; Apical four chamber;: 3 65 cm  Left Atrium MOD Diam; Most recent value chosen; End Systole; Apical four chamber;: 3 82 cm  Left Atrium MOD Diam; Most recent value chosen; End Systole; Apical four chamber;: 3 88 cm  Left Atrium MOD Diam; Most recent value chosen; End Systole; Apical four chamber;: 4 01 cm  Left Atrium Systolic Area; Most recent value chosen; Method of Disks, Single Plane; 2D mode; Apical four chamber;: 1570 mm2  Left Atrium Systolic Volume;  Most recent value chosen; Method of Disks, Single Plane; 2D mode; Apical four chamber;: 40 cm3  Left Atrium systolic major axis; Most recent value chosen; Method of Disks, Single Plane; 2D mode; Apical four chamber;: 4 79 cm  LV MOD Diam; Recent value; End Diastole (A4C): 2 77 cm  LV MOD Diam; Recent value; End Diastole (A4C): 4 56 cm  LV MOD Diam; Recent value; End Diastole (A4C): 5 29 cm  LV MOD Diam; Recent value; End Diastole (A4C): 5 33 cm  LV MOD Diam; Recent value; End Diastole (A4C): 5 27 cm  LV MOD Diam; Recent value; End Diastole (A4C): 5 07 cm  LV MOD Diam; Recent value; End Diastole (A4C): 2 02 cm  LV MOD Diam; Recent value; End Diastole (A4C): 2 85 cm  LV MOD Diam; Recent value; End Diastole (A4C): 3 51 cm  LV MOD Diam; Recent value; End Diastole (A4C): 4 01 cm  LV MOD Diam; Recent value; End Diastole (A4C): 4 3 cm  LV MOD Diam; Recent value; End Diastole (A4C): 4 56 cm  LV MOD Diam; Recent value; End Diastole (A4C): 4 76 cm  LV MOD Diam; Recent value; End Diastole (A4C): 4 91 cm  LV MOD Diam; Recent value; End Diastole (A4C): 4 94 cm  LV MOD Diam; Recent value; End Diastole (A4C): 4 96 cm  LV MOD Diam; Recent value; End Diastole (A4C): 5 17 cm  LV MOD Diam; Recent value; End Diastole (A4C): 5 29 cm  LV MOD Diam; Recent value; End Diastole (A4C): 5 36 cm  LV MOD Diam; Recent value; End Diastole (A4C): 5 22 cm  LV MOD Diam; Recent value; End Systole (A4C): 1 92 cm  LV MOD Diam; Recent value; End Systole (A4C): 3 71 cm  LV MOD Diam; Recent value; End Systole (A4C): 3 61 cm  LV MOD Diam; Recent value; End Systole (A4C): 3 73 cm  LV MOD Diam; Recent value; End Systole (A4C): 2 84 cm  LV MOD Diam; Recent value; End Systole (A4C): 3 cm  LV MOD Diam; Recent value; End Systole (A4C): 3 08 cm  LV MOD Diam; Recent value; End Systole (A4C): 3 14 cm  LV MOD Diam; Recent value; End Systole (A4C): 3 21 cm  LV MOD Diam; Recent value; End Systole (A4C): 3 3 cm  LV MOD Diam; Recent value;  End Systole (A4C): 3 45 cm  LV MOD Diam; Recent value; End Systole (A4C): 3 61 cm  LV MOD Diam; Recent value; End Systole (A4C): 3 73 cm  LV MOD Diam; Recent value; End Systole (A4C): 1 62 cm  LV MOD Diam; Recent value; End Systole (A4C): 2 15 cm  LV MOD Diam; Recent value; End Systole (A4C): 2 56 cm  LV MOD Diam; Recent value; End Systole (A4C): 3 63 cm  LV MOD Diam; Recent value; End Systole (A4C): 3 66 cm  LV MOD Diam; Recent value; End Systole (A4C): 3 75 cm  LV MOD Diam; Recent value; End Systole (A4C): 3 71 cm  LVEF MOD A4C: 59 %  Left Ventricle diastolic major axis; Most recent value chosen; Method of Disks, Single Plane; 2D mode; Apical four chamber;: 9 61 cm  Left Ventricle systolic major axis; Most recent value chosen; Method of Disks, Single Plane; 2D mode; Apical four chamber;: 7 98 cm  Left Ventricular Diastolic Area; Most recent value chosen; Method of Disks, Single Plane; 2D mode; Apical four chamber;: 4380 mm2  Left Ventricular End Diastolic Volume; Most recent value chosen; Method of Disks, Single Plane; 2D mode; Apical four chamber;: 160 cm3  Left Ventricular End Systolic Volume; Most recent value chosen; Method of Disks, Single Plane; 2D mode; Apical four chamber;: 65 cm3  Left Ventricular Systolic Area; Most recent value chosen; Method of Disks, Single Plane; 2D mode; Apical four chamber;: 2550 mm2  SV MOD A4C: 94 cm3    Unspecified Scan Mode  DT; Antegrade Flow: 366 ms  DT; Mean; Antegrade Flow: 366 ms  MV A Parminder: 603 mm/s  MV E Parminder: 644 mm/s  MV E/A Ratio: 1 1  MV Peak A Parminder: 603 mm/s  MV Peak E Parminder; Mean; Antegrade Flow: 644 mm/s  Peak Grad; Mean; Regurgitant Flow: 26 mm[Hg]  Vmax; Mean; Regurgitant Flow: 2530 mm/s  Vmax; Regurgitant Flow: 2310 mm/s  Vmax; Regurgitant Flow: 2660 mm/s  Vmax; Regurgitant Flow: 2630 mm/s  Atrial [facility]; Most recent value chosen;: 2320 mm2  [facility]; Most recent value chosen;: 5 32 cm  Atrial [place] Volume;  Most recent value chosen;: 81 cm3  Distance;: 10 4 cm  Distance;: 3 2 cm  Distance; Mean; Mean value chosen;: 6 8 cm  Distance; User chosen value;: 3 1 cm    [facility]    Prepared and electronically signed by    Deven Esteves MD  Signed 05-Mar-2019 15:57:34    No results found for this or any previous visit  This note was completed in part utilizing Voalte direct voice recognition software  Grammatical errors, random word insertion, spelling mistakes, and incomplete sentences may be an occasional consequence of the system secondary to software limitations, ambient noise and hardware issues  At the time of dictation, efforts were made to edit, clarify and /or correct errors  Please read the chart carefully and recognize, using context, where substitutions have occurred    If you have any questions or concerns about the context, text or information contained within the body of this dictation, please contact myself, the provider, for further clarification

## 2022-12-13 ENCOUNTER — OFFICE VISIT (OUTPATIENT)
Dept: CARDIOLOGY CLINIC | Facility: CLINIC | Age: 77
End: 2022-12-13

## 2022-12-13 VITALS
HEIGHT: 73 IN | DIASTOLIC BLOOD PRESSURE: 87 MMHG | WEIGHT: 204 LBS | RESPIRATION RATE: 18 BRPM | OXYGEN SATURATION: 99 % | BODY MASS INDEX: 27.04 KG/M2 | SYSTOLIC BLOOD PRESSURE: 151 MMHG | HEART RATE: 60 BPM

## 2022-12-13 DIAGNOSIS — R55 SYNCOPE AND COLLAPSE: ICD-10-CM

## 2022-12-13 DIAGNOSIS — N18.31 STAGE 3A CHRONIC KIDNEY DISEASE (HCC): ICD-10-CM

## 2022-12-13 DIAGNOSIS — I65.22 STENOSIS OF LEFT CAROTID ARTERY: ICD-10-CM

## 2022-12-13 DIAGNOSIS — Z09 HOSPITAL DISCHARGE FOLLOW-UP: Primary | ICD-10-CM

## 2022-12-13 DIAGNOSIS — I10 BENIGN ESSENTIAL HYPERTENSION: ICD-10-CM

## 2022-12-13 NOTE — PATIENT INSTRUCTIONS
Mediterranean Diet   AMBULATORY CARE:   A Mediterranean diet  is a meal plan that includes foods that are commonly eaten in countries that border the [place]  This meal plan may provide several health benefits  These include losing or maintaining weight, and decreasing blood pressure, blood sugar, and cholesterol levels  It may also help protect against certain health conditions such as heart disease, cancer, type 2 diabetes, and Alzheimer disease  Work with a dietitian to develop a meal plan that is right for you  Foods to include in the [address] [facility] diet:   Include fruits and vegetables in each meal   Eat a variety of fresh fruits and vegetables  Choose whole grains every day  These foods include whole-grain breads, pastas, and cereals  It also includes brown rice, quinoa, and millet  Use unsaturated fats instead of saturated fats  Cook with olive or canola oil  Limit saturated fats, such as butter, margarine, and shortening  Saturated fat is an unhealthy fat that can increase your cholesterol levels  Choose plant foods, poultry, and fish as your main sources of protein  Eat plant-based foods that provide protein,  such as lentils, beans, chickpeas, nuts, and seeds  Choose mostly plant-based foods in place of meat on most days of the week  Eat protein foods high in omega-3 fats  Fish high in omega-3 fats include salmon, trout, and tuna  Include these types of fish 1 or 2 times each week  Limit fish high in mercury, such as shark, swordfish, tilefish, and reema mackerel  Omega-3 fats are also found in walnuts and flaxseed  Choose poultry (chicken or turkey)  without skin instead of red meat  Red meat is high in saturated fat  Limit eggs and high-fat meats, such as [place], sausage, and hot dogs  Choose low-fat dairy foods  such as nonfat or 1% milk, or low-fat almond, cashew, or soy milk  Other examples include low-fat cheese, yogurt, and cottage cheese  Limit sweets  Limit your intake of high-sugar foods, such as soda, desserts, and candy  Talk to your healthcare provider about alcohol  Studies have shown that moderate intake of wine may reduce the risk of heart disease  A moderate amount of wine is 1 serving for women and men 65 years and older each day  Two servings is recommended for men 24to 59years of age each day  A serving of wine is 5 ounces  Other things you need to know if you follow the Mediterranean diet:   Include foods high in iron and vitamin C   Plant-based foods that are high in iron include spinach, beans, tofu, and artichoke  Eat a serving of vitamin C with any iron-rich food to help your body absorb more iron  Examples include oranges, strawberries, cantaloupe, broccoli, and yellow peppers  Get regular physical activity  The Mediterranean diet will have the most benefit if you get regular physical activity  Get 30 minutes of physical activity at least 5 days a week  Choose physical activities that increase your heart rate  Examples include walking, hiking, swimming, and riding a bike  Ask your healthcare provider about the best exercise plan for you  © Copyright [employer] 2022 Information is for End User's use only and may not be sold, redistributed or otherwise used for commercial purposes  All illustrations and images included in CareNotes® are the copyrighted property of [employer]  or Aakash Rogers  The above information is an  only  It is not intended as medical advice for individual conditions or treatments  Talk to your doctor, nurse or pharmacist before following any medical regimen to see if it is safe and effective for you

## 2022-12-13 NOTE — LETTER
2022     Pinky Ho MD  [address]  [address]  [place]  [id number]    Patient: Ana Lilia Patel   YOB: 1945   Date of Visit: 2022       Dear Dr Gerhard Cisneros: Thank you for referring Dewayne Rodriuges to me for evaluation  Below are my notes for this consultation  If you have questions, please do not hesitate to call me  I look forward to following your patient along with you  Sincerely,        BHUPINDER Bowling        CC: MD Romina Griffith, [address]  2022  7:48 AM  Sign when [facility] Visit  Cardiology  [facility] [place] Follow Up   Office Visit Note  Ana Lilia Patel   68 y o    male   MRN: [id number]  [address]    [address] [id number] [phone]    PCP: Pinky Ho MD  Cardiologist: Dr Sukhjinder Connor            Summary of recommendations  Heart healthy diet  Educational information provided  Encouraged hydration  Periodic home blood pressure monitoring  Goal: < 130/80  He is to give us a call let us know how his blood pressures have been running  His machine of note, has been validated  For prevention: Agree with adjustment of his antihypertensives per his PCP, amlodipine nightly, losartan in the morning  Follow up will be scheduled with Dr Pito De Leon screenin/15/2020, up-to-date      Assessment/plan  Vasovagal syncope recent hospital adm -22,   He had syncope in the shower at his gym  Work-up unremarkable  Followed by Cardiology as an IP  Recommend he adjust the timing of his antihypertensives as above  Hypertension, essential   /87 losartan 100 mg daily, amlodipine 5 mg nightly  He did have a couple cups of coffee this morning  He admits his blood pressure is usually a little higher in the office due to some mild anxiety    He will check his blood pressures at home, and give us a call in a few weeks  Hyperlipidemia, on rosuvastatin 10 mg daily  Lipid panel 8/2022 , TG 76, HDL 62, LDL 76  CKD 3A  Baseline creatinine 1 2  Carotid artery stenosis CUS 1/19/21: less than 50% ICA stenoses bilateral  Cardiac testing  · EST 2/26/18 Normal study after maximal exercise with reproduction of symptoms  No evidence of inducible ischemia by EKG, but patient did report some chest discomfort with exercise  · TTE  3/5/2019: EF 55%, mild LVH, G1DD, normal RVSF, no significant valvular abnormality  • TTE 12/6/22  EF 60%  Grade 1 DD  Mild ARON  Mild MR  Mild TR             HPI  Marie Garcia is a 69 yo male with essential hypertension, hyperlipidemia, and chronic kidney disease  He follows with Dr Denice Stark  He was seen in the office in 2018 for atypical chest pain maintenance cardiac medications include amlodipine 5 mg daily, losartan 100 mg daily and crestor 10 mg daily  Adm 12/5-12/6/22  CC: Fall syncope  He was swimming at a local pool  After swimming 48 laps he had a syncopal episode in the shower with head strike  He had+ LOC  Systolic blood pressure was 80 on arrival   Creatinine 1 56 (above baseline of 1 2)  Given a liter of fluids  Dx: Syncope  Sustained a scalp laceration related to syncope  CTH and cervical spine CT: Negative for traumatic injury  Echocardiogram: EF 60%  Grade 1 DD  No significant valvular heart disease  Troponins negative  EKG normal sinus rhythm, first-degree AV block  No acute ischemic changes  Followed by Cardiology  Patient reported he took his antihypertensives prior to swimming  He usually takes them after he swims  Vasovagal syncope suspected      12/12/22  Hospital follow-up; vasovagal syncope  Review of systems: He denies chest pain, shortness of breath, lightheadedness  He does have a headache, which is improving, and has some sensitivity to light  Occasionally he gets dizzy with position changes  He is hydrating  He has a posterior scalp laceration that is improving  He has not been back to the pool yet    He is giving it another week or so, until the adhesive over his laceration has had some additional healing time  Exam otherwise unremarkable  Blood pressure 151/87  I encouraged hydration  I recommend he monitor his blood pressure periodically at home a couple times of day throughout the day  I gave him a home blood pressure sheet  He was advised to give us a call in a couple weeks with some home blood pressure readings  Goal blood pressure less than 130/80  His home blood pressure machine has been validated      Assessment  Diagnoses and all orders for this visit:    Hospital discharge follow-up    Syncope and collapse    Benign essential hypertension    Stenosis of left carotid artery    Stage 3a chronic kidney disease ([facility] [address] )          Past Medical History:   Diagnosis Date   • Benign essential hypertension    • Broken ribs    • Chest pain        Review of Systems   Constitutional: Negative for chills  Cardiovascular: Negative for chest pain, claudication, cyanosis, dyspnea on exertion, irregular heartbeat, leg swelling, near-syncope, orthopnea, palpitations, paroxysmal nocturnal dyspnea and syncope  Respiratory: Negative for cough and shortness of breath  Gastrointestinal: Negative for heartburn and nausea  Neurological: Positive for dizziness  Negative for focal weakness, headaches, light-headedness and weakness  Mild posterior headache  Photophobia  All other systems reviewed and are negative  Allergies   Allergen Reactions   • Diazepam      Increased anger  • Gabapentin Other (See Comments)     Severe mood swings, anger           Current Outpatient Medications:   •  amLODIPine (NORVASC) 5 mg tablet, Take 1 tablet (5 mg total) by mouth daily at bedtime, Disp: 90 tablet, Rfl: 3  •  ascorbic acid (VITAMIN C) 1000 MG tablet, Take 1,000 mg by mouth, Disp: , Rfl:   •  cetirizine (ZyrTEC) 10 mg tablet, Take 1 tablet by mouth, Disp: , Rfl:   •  Cholecalciferol (Vitamin D3) 50 MCG (2000 UT) capsule, Take 1 capsule (2,000 Units total) by mouth daily, Disp: , Rfl: 0  •  DAILY MULTIPLE VITAMINS tablet, Take 1 tablet by mouth daily, Disp: , Rfl:   •  Diclofenac Sodium (VOLTAREN) 1 %, Apply 2 g topically 4 (four) times a day, Disp: 2 g, Rfl: 2  •  fluticasone (FLONASE) 50 mcg/act nasal spray, SHAKE LIQUID AND USE 2 SPRAYS IN EACH NOSTRIL DAILY, Disp: 48 g, Rfl: 11  •  glucosamine-chondroitin 500-400 MG tablet, Take 1 tablet by mouth daily, Disp: , Rfl:   •  hydrocortisone 2 5 % cream, Apply topically 2 (two) times a day as needed for rash, Disp: 30 g, Rfl: 5  •  losartan (COZAAR) 100 MG tablet, Take 1 tablet (100 mg total) by mouth daily, Disp: 90 tablet, Rfl: 3  •  rosuvastatin (CRESTOR) 10 MG tablet, Take 1 tablet (10 mg total) by mouth daily, Disp: 90 tablet, Rfl: 3        Social History     Socioeconomic History   • Marital status: /Civil Union     Spouse name: Not on file   • Number of children: Not on file   • Years of education: Not on file   • Highest education level: Not on file   Occupational History   • Not on file   Tobacco Use   • Smoking status: Former     Types: Cigarettes     Quit date:      Years since quittin 9   • Smokeless tobacco: Never   Vaping Use   • Vaping Use: Never used   Substance and Sexual Activity   • Alcohol use: Yes     Comment: Rare   • Drug use: Not Currently   • Sexual activity: Not on file   Other Topics Concern   • Not on file   Social History Narrative   • Not on file     Social Determinants of Health     Financial Resource Strain: Not on file   Food Insecurity: Not on file   Transportation Needs: Not on file   Physical Activity: Not on file   Stress: Not on file   Social Connections: Not on file   Intimate Partner Violence: Not on file   Housing Stability: Not on file       Family History   Problem Relation Age of Onset   • Colon cancer Mother    • Cancer Father    • Thyroid cancer Father    • Other Father         MOTOR VEHICLE TRAFFIC ACCIDENT   • Ovarian cancer Sister        Physical Exam  Vitals and nursing note reviewed  Constitutional:       General: He is not in acute distress  HENT:      Head: Normocephalic and atraumatic  Eyes:      Conjunctiva/sclera: Conjunctivae normal    Cardiovascular:      Rate and Rhythm: Normal rate and regular rhythm  Pulses: Intact distal pulses  Heart sounds: Normal heart sounds  Pulmonary:      Effort: Pulmonary effort is normal       Breath sounds: Normal breath sounds  Abdominal:      General: Bowel sounds are normal       Palpations: Abdomen is soft  Musculoskeletal:         General: Normal range of motion  Cervical back: Normal range of motion and neck supple  Skin:     General: Skin is warm and dry  Comments: Adhesive intact over posterior scalp laceration   Neurological:      Mental Status: He is alert and oriented to person, place, and time  Vitals: There were no vitals taken for this visit  Wt Readings from Last 3 Encounters:   12/12/22 88 5 kg (195 lb)   12/06/22 89 4 kg (197 lb)   10/03/22 90 4 kg (199 lb 6 4 oz)         Labs & Results:  Lab Results   Component Value Date    WBC 9 16 12/06/2022    HGB 14 3 12/06/2022    HCT 43 2 12/06/2022    MCV 94 12/06/2022     12/06/2022     No results found for: BNP  No components found for: CHEM  Troponin I   Date Value Ref Range Status   03/04/2019 <0 02 <=0 04 ng/mL Final     Comment:       [employer] Chemistry analyzer 99% cutoff is > 0 04 ng/mL in network labs     o cTnI 99% cutoff is useful only when applied to patients in the clinical setting of myocardial ischemia   o cTnI 99% cutoff should be interpreted in the context of clinical history, ECG findings and possibly cardiac imaging to establish correct diagnosis  o cTnI 99% cutoff may be suggestive but clearly not indicative of a coronary event without the clinical setting of myocardial ischemia         Results for orders placed during the hospital encounter of 19    Echo complete with contrast if indicated    Narrative  [address]  [facility] [place] [address] [facility]  [phone]    Transthoracic Echocardiogram  2D, M-mode, Doppler, and Color Doppler    Study date:  05-Mar-2019    Patient: Hyacinth Wilson  MR number: [id number]  Account number: [de-identified]  : 1945  Age: 68 years  Gender: Male  Status: Outpatient  Location: Bedside  Height: 73 in  Weight: 215 lb  BP: 147/ 90 mmHg    Indications: Syncope  Diagnoses: R55  - Syncope and collapse    Sonographer:  Merline Sanes, RDCS  Primary Physician:  Andrews España MD  Referring Physician:  Adriana Fuller DO  Group:  [facility]  Interpreting Physician:  Forrest Fernandez MD    SUMMARY    LEFT VENTRICLE:  Size was normal   Systolic function was normal  Ejection fraction was estimated to be 55 %  There was mild concentric hypertrophy  Doppler parameters were consistent with abnormal left ventricular relaxation (grade 1 diastolic dysfunction)  RIGHT VENTRICLE:  The size was normal   Systolic function was normal     TRICUSPID VALVE:  There was trace regurgitation  COMPARISONS:  There has been no significant interval change  Comparison was made with the previous study of 2018  HISTORY: PRIOR HISTORY: Seizure, chest pain, hypertension  Family history of cancer  PROCEDURE: The procedure was performed at the bedside  This was a routine study  The transthoracic approach was used  The study included complete 2D imaging, M-mode, complete spectral Doppler, and color Doppler  The heart rate was 63 bpm,  at the start of the study  LEFT VENTRICLE: Size was normal  Systolic function was normal  Ejection fraction was estimated to be 55 %  There were no regional wall motion abnormalities  Wall thickness was mildly increased  There was mild concentric hypertrophy    DOPPLER: Doppler parameters were consistent with abnormal left ventricular relaxation (grade 1 diastolic dysfunction)  RIGHT VENTRICLE: The size was normal  Systolic function was normal  Wall thickness was normal     LEFT ATRIUM: Size was normal     RIGHT ATRIUM: Size was normal     MITRAL VALVE: Valve structure was normal  There was normal leaflet separation  DOPPLER: The transmitral velocity was within the normal range  There was no evidence for stenosis  There was no regurgitation  AORTIC VALVE: The valve was trileaflet  Leaflets exhibited normal thickness and normal cuspal separation  DOPPLER: Transaortic velocity was within the normal range  There was no evidence for stenosis  There was no regurgitation  TRICUSPID VALVE: The valve structure was normal  There was normal leaflet separation  DOPPLER: The transtricuspid velocity was within the normal range  There was no evidence for stenosis  There was trace regurgitation  The tricuspid jet  envelope definition was inadequate for estimation of RV systolic pressure  There are no indirect findings suggestive of moderate or severe pulmonary hypertension  PULMONIC VALVE: Leaflets exhibited normal thickness, no calcification, and normal cuspal separation  DOPPLER: The transpulmonic velocity was within the normal range  There was no regurgitation  PERICARDIUM: There was no pericardial effusion  The pericardium was normal in appearance  AORTA: The root exhibited normal size  SYSTEMIC VEINS: IVC: The inferior vena cava was normal in size and course   Respirophasic changes were normal     SYSTEM MEASUREMENT TABLES    2D mode  Aortic Root Diameter; User chosen value; 2D mode;: 3 5 cm  LA/Ao (2D): 1 14  Left Atrium Paul-posterior Systolic Dimension; User chosen value; 2D mode;: 4 cm  EDV (2D-Cubed): 186 cm3  EF (2D-Cubed): 71 9 %  ESV (2D-Cubed): 52 3 cm3  FS (2D-Cubed): 34 5 %  FS (2D-Teich): 34 5 %  IVS/LVPW (2D): 0 82  Interventricular Septum Diastolic Thickness; User chosen value; 2D mode;: 1 09 cm  Left Ventricle Internal End Diastolic Dimension; User chosen value; 2D mode;: 5 71 cm  Left Ventricle Internal Systolic Dimension; User chosen value; 2D mode;: 3 74 cm  Left Ventricle Posterior Wall Diastolic Thickness; User chosen value; 2D mode;: 1 33 cm  Left Ventricular Ejection Fraction; Teichholz; 2D mode;: 63 %  Left Ventricular End Diastolic Volume; Teichholz; 2D mode;: 161 cm3  Left Ventricular End Systolic Volume; Teichholz; 2D mode;: 59 6 cm3  SV (2D-Cubed): 134 cm3  Stroke Volume; Teichholz; 2D mode;: 101 cm3    Apical four chamber  Left Atrium MOD Diam; Most recent value chosen; End Systole; Apical four chamber;: 2 07 cm  Left Atrium MOD Diam; Most recent value chosen; End Systole; Apical four chamber;: 3 07 cm  Left Atrium MOD Diam; Most recent value chosen; End Systole; Apical four chamber;: 3 92 cm  Left Atrium MOD Diam; Most recent value chosen; End Systole; Apical four chamber;: 3 95 cm  Left Atrium MOD Diam; Most recent value chosen; End Systole; Apical four chamber;: 3 95 cm  Left Atrium MOD Diam; Most recent value chosen; End Systole; Apical four chamber;: 3 82 cm  Left Atrium MOD Diam; Most recent value chosen; End Systole; Apical four chamber;: 1 55 cm  Left Atrium MOD Diam; Most recent value chosen; End Systole; Apical four chamber;: 1 94 cm  Left Atrium MOD Diam; Most recent value chosen; End Systole; Apical four chamber;: 2 2 cm  Left Atrium MOD Diam; Most recent value chosen; End Systole; Apical four chamber;: 2 46 cm  Left Atrium MOD Diam; Most recent value chosen; End Systole; Apical four chamber;: 2 58 cm  Left Atrium MOD Diam; Most recent value chosen; End Systole; Apical four chamber;: 2 81 cm  Left Atrium MOD Diam; Most recent value chosen; End Systole; Apical four chamber;: 3 1 cm  Left Atrium MOD Diam; Most recent value chosen; End Systole; Apical four chamber;: 3 3 cm  Left Atrium MOD Diam; Most recent value chosen; End Systole;  Apical four chamber;: 3 5 cm  Left Atrium MOD Diam; Most recent value chosen; End Systole; Apical four chamber;: 3 56 cm  Left Atrium MOD Diam; Most recent value chosen; End Systole; Apical four chamber;: 3 65 cm  Left Atrium MOD Diam; Most recent value chosen; End Systole; Apical four chamber;: 3 82 cm  Left Atrium MOD Diam; Most recent value chosen; End Systole; Apical four chamber;: 3 88 cm  Left Atrium MOD Diam; Most recent value chosen; End Systole; Apical four chamber;: 4 01 cm  Left Atrium Systolic Area; Most recent value chosen; Method of Disks, Single Plane; 2D mode; Apical four chamber;: 1570 mm2  Left Atrium Systolic Volume; Most recent value chosen; Method of Disks, Single Plane; 2D mode; Apical four chamber;: 40 cm3  Left Atrium systolic major axis; Most recent value chosen; Method of Disks, Single Plane; 2D mode; Apical four chamber;: 4 79 cm  LV MOD Diam; Recent value; End Diastole (A4C): 2 77 cm  LV MOD Diam; Recent value; End Diastole (A4C): 4 56 cm  LV MOD Diam; Recent value; End Diastole (A4C): 5 29 cm  LV MOD Diam; Recent value; End Diastole (A4C): 5 33 cm  LV MOD Diam; Recent value; End Diastole (A4C): 5 27 cm  LV MOD Diam; Recent value; End Diastole (A4C): 5 07 cm  LV MOD Diam; Recent value; End Diastole (A4C): 2 02 cm  LV MOD Diam; Recent value; End Diastole (A4C): 2 85 cm  LV MOD Diam; Recent value; End Diastole (A4C): 3 51 cm  LV MOD Diam; Recent value; End Diastole (A4C): 4 01 cm  LV MOD Diam; Recent value; End Diastole (A4C): 4 3 cm  LV MOD Diam; Recent value; End Diastole (A4C): 4 56 cm  LV MOD Diam; Recent value; End Diastole (A4C): 4 76 cm  LV MOD Diam; Recent value; End Diastole (A4C): 4 91 cm  LV MOD Diam; Recent value; End Diastole (A4C): 4 94 cm  LV MOD Diam; Recent value; End Diastole (A4C): 4 96 cm  LV MOD Diam; Recent value; End Diastole (A4C): 5 17 cm  LV MOD Diam; Recent value; End Diastole (A4C): 5 29 cm  LV MOD Diam; Recent value; End Diastole (A4C): 5 36 cm  LV MOD Diam; Recent value; End Diastole (A4C): 5 22 cm  LV MOD Diam; Recent value;  End Systole (A4C): 1 92 cm  LV MOD Diam; Recent value; End Systole (A4C): 3 71 cm  LV MOD Diam; Recent value; End Systole (A4C): 3 61 cm  LV MOD Diam; Recent value; End Systole (A4C): 3 73 cm  LV MOD Diam; Recent value; End Systole (A4C): 2 84 cm  LV MOD Diam; Recent value; End Systole (A4C): 3 cm  LV MOD Diam; Recent value; End Systole (A4C): 3 08 cm  LV MOD Diam; Recent value; End Systole (A4C): 3 14 cm  LV MOD Diam; Recent value; End Systole (A4C): 3 21 cm  LV MOD Diam; Recent value; End Systole (A4C): 3 3 cm  LV MOD Diam; Recent value; End Systole (A4C): 3 45 cm  LV MOD Diam; Recent value; End Systole (A4C): 3 61 cm  LV MOD Diam; Recent value; End Systole (A4C): 3 73 cm  LV MOD Diam; Recent value; End Systole (A4C): 1 62 cm  LV MOD Diam; Recent value; End Systole (A4C): 2 15 cm  LV MOD Diam; Recent value; End Systole (A4C): 2 56 cm  LV MOD Diam; Recent value; End Systole (A4C): 3 63 cm  LV MOD Diam; Recent value; End Systole (A4C): 3 66 cm  LV MOD Diam; Recent value; End Systole (A4C): 3 75 cm  LV MOD Diam; Recent value; End Systole (A4C): 3 71 cm  LVEF MOD A4C: 59 %  Left Ventricle diastolic major axis; Most recent value chosen; Method of Disks, Single Plane; 2D mode; Apical four chamber;: 9 61 cm  Left Ventricle systolic major axis; Most recent value chosen; Method of Disks, Single Plane; 2D mode; Apical four chamber;: 7 98 cm  Left Ventricular Diastolic Area; Most recent value chosen; Method of Disks, Single Plane; 2D mode; Apical four chamber;: 4380 mm2  Left Ventricular End Diastolic Volume; Most recent value chosen; Method of Disks, Single Plane; 2D mode; Apical four chamber;: 160 cm3  Left Ventricular End Systolic Volume; Most recent value chosen; Method of Disks, Single Plane; 2D mode; Apical four chamber;: 65 cm3  Left Ventricular Systolic Area; Most recent value chosen; Method of Disks, Single Plane; 2D mode; Apical four chamber;: 2550 mm2  SV MOD A4C: 94 cm3    Unspecified Scan Mode  DT;  Antegrade Flow: 366 ms  DT; Mean; Antegrade Flow: 366 ms  MV A Parminder: 603 mm/s  MV E Parminder: 644 mm/s  MV E/A Ratio: 1 1  MV Peak A Parminder: 603 mm/s  MV Peak E Parminder; Mean; Antegrade Flow: 644 mm/s  Peak Grad; Mean; Regurgitant Flow: 26 mm[Hg]  Vmax; Mean; Regurgitant Flow: 2530 mm/s  Vmax; Regurgitant Flow: 2310 mm/s  Vmax; Regurgitant Flow: 2660 mm/s  Vmax; Regurgitant Flow: 2630 mm/s  Atrial [employer]; Most recent value chosen;: 2320 mm2  [facility]; Most recent value chosen;: 5 32 cm  Atrial R[place]y Volume; Most recent value chosen;: 81 cm3  Distance;: 10 4 cm  Distance;: 3 2 cm  Distance; Mean; Mean value chosen;: 6 8 cm  Distance; User chosen value;: 3 1 cm    [facility]    Prepared and electronically signed by    Luann Lyons MD  Signed 05-Mar-2019 15:57:34    No results found for this or any previous visit  This note was completed in part utilizing [employer] direct voice recognition software  Grammatical errors, random word insertion, spelling mistakes, and incomplete sentences may be an occasional consequence of the system secondary to software limitations, ambient noise and hardware issues  At the time of dictation, efforts were made to edit, clarify and /or correct errors  Please read the chart carefully and recognize, using context, where substitutions have occurred    If you have any questions or concerns about the context, text or information contained within the body of this dictation, please contact myself, the provider, for further clarification

## 2023-02-03 PROBLEM — S01.01XA SCALP LACERATION, INITIAL ENCOUNTER: Status: RESOLVED | Noted: 2022-12-05 | Resolved: 2023-02-03

## 2023-03-14 ENCOUNTER — APPOINTMENT (OUTPATIENT)
Dept: LAB | Age: 78
End: 2023-03-14

## 2023-03-14 DIAGNOSIS — I10 BENIGN ESSENTIAL HYPERTENSION: ICD-10-CM

## 2023-03-14 DIAGNOSIS — I10 BENIGN ESSENTIAL HTN: ICD-10-CM

## 2023-03-14 DIAGNOSIS — E78.00 HYPERCHOLESTEROLEMIA: ICD-10-CM

## 2023-03-14 DIAGNOSIS — R63.4 WEIGHT LOSS: ICD-10-CM

## 2023-03-14 DIAGNOSIS — I10 HYPERTENSION, UNSPECIFIED TYPE: ICD-10-CM

## 2023-03-14 DIAGNOSIS — E55.9 VITAMIN D DEFICIENCY: ICD-10-CM

## 2023-03-14 DIAGNOSIS — Z00.00 WELL ADULT ON ROUTINE HEALTH CHECK: ICD-10-CM

## 2023-03-14 DIAGNOSIS — N18.2 STAGE 2 CHRONIC KIDNEY DISEASE: ICD-10-CM

## 2023-03-14 DIAGNOSIS — Z12.5 PROSTATE CANCER SCREENING: ICD-10-CM

## 2023-03-14 DIAGNOSIS — L20.84 INTRINSIC ECZEMA: ICD-10-CM

## 2023-03-14 DIAGNOSIS — R73.9 HYPERGLYCEMIA: ICD-10-CM

## 2023-03-14 LAB
25(OH)D3 SERPL-MCNC: 32.5 NG/ML (ref 30–100)
ALBUMIN SERPL BCP-MCNC: 4.1 G/DL (ref 3.5–5)
ALP SERPL-CCNC: 66 U/L (ref 46–116)
ALT SERPL W P-5'-P-CCNC: 25 U/L (ref 12–78)
ANION GAP SERPL CALCULATED.3IONS-SCNC: 4 MMOL/L (ref 4–13)
AST SERPL W P-5'-P-CCNC: 25 U/L (ref 5–45)
BASOPHILS # BLD AUTO: 0.04 THOUSANDS/ÂΜL (ref 0–0.1)
BASOPHILS NFR BLD AUTO: 1 % (ref 0–1)
BILIRUB SERPL-MCNC: 1.41 MG/DL (ref 0.2–1)
BILIRUB UR QL STRIP: NEGATIVE
BUN SERPL-MCNC: 20 MG/DL (ref 5–25)
CALCIUM SERPL-MCNC: 10 MG/DL (ref 8.3–10.1)
CHLORIDE SERPL-SCNC: 106 MMOL/L (ref 96–108)
CHOLEST SERPL-MCNC: 157 MG/DL
CLARITY UR: CLEAR
CO2 SERPL-SCNC: 27 MMOL/L (ref 21–32)
COLOR UR: YELLOW
CREAT SERPL-MCNC: 1.4 MG/DL (ref 0.6–1.3)
EOSINOPHIL # BLD AUTO: 0.27 THOUSAND/ÂΜL (ref 0–0.61)
EOSINOPHIL NFR BLD AUTO: 4 % (ref 0–6)
ERYTHROCYTE [DISTWIDTH] IN BLOOD BY AUTOMATED COUNT: 13.3 % (ref 11.6–15.1)
EST. AVERAGE GLUCOSE BLD GHB EST-MCNC: 117 MG/DL
GFR SERPL CREATININE-BSD FRML MDRD: 48 ML/MIN/1.73SQ M
GLUCOSE P FAST SERPL-MCNC: 95 MG/DL (ref 65–99)
GLUCOSE UR STRIP-MCNC: NEGATIVE MG/DL
HBA1C MFR BLD: 5.7 %
HCT VFR BLD AUTO: 46.6 % (ref 36.5–49.3)
HDLC SERPL-MCNC: 64 MG/DL
HGB BLD-MCNC: 15.4 G/DL (ref 12–17)
HGB UR QL STRIP.AUTO: NEGATIVE
IMM GRANULOCYTES # BLD AUTO: 0.02 THOUSAND/UL (ref 0–0.2)
IMM GRANULOCYTES NFR BLD AUTO: 0 % (ref 0–2)
KETONES UR STRIP-MCNC: NEGATIVE MG/DL
LDLC SERPL CALC-MCNC: 76 MG/DL (ref 0–100)
LEUKOCYTE ESTERASE UR QL STRIP: NEGATIVE
LYMPHOCYTES # BLD AUTO: 1.52 THOUSANDS/ÂΜL (ref 0.6–4.47)
LYMPHOCYTES NFR BLD AUTO: 25 % (ref 14–44)
MCH RBC QN AUTO: 30.7 PG (ref 26.8–34.3)
MCHC RBC AUTO-ENTMCNC: 33 G/DL (ref 31.4–37.4)
MCV RBC AUTO: 93 FL (ref 82–98)
MONOCYTES # BLD AUTO: 0.52 THOUSAND/ÂΜL (ref 0.17–1.22)
MONOCYTES NFR BLD AUTO: 8 % (ref 4–12)
NEUTROPHILS # BLD AUTO: 3.8 THOUSANDS/ÂΜL (ref 1.85–7.62)
NEUTS SEG NFR BLD AUTO: 62 % (ref 43–75)
NITRITE UR QL STRIP: NEGATIVE
NRBC BLD AUTO-RTO: 0 /100 WBCS
PH UR STRIP.AUTO: 5.5 [PH]
PLATELET # BLD AUTO: 286 THOUSANDS/UL (ref 149–390)
PMV BLD AUTO: 11.3 FL (ref 8.9–12.7)
POTASSIUM SERPL-SCNC: 4.3 MMOL/L (ref 3.5–5.3)
PROT SERPL-MCNC: 7.2 G/DL (ref 6.4–8.4)
PROT UR STRIP-MCNC: NEGATIVE MG/DL
PSA SERPL-MCNC: 1.5 NG/ML (ref 0–4)
RBC # BLD AUTO: 5.02 MILLION/UL (ref 3.88–5.62)
SODIUM SERPL-SCNC: 137 MMOL/L (ref 135–147)
SP GR UR STRIP.AUTO: 1.02 (ref 1–1.03)
TRIGL SERPL-MCNC: 85 MG/DL
TSH SERPL DL<=0.05 MIU/L-ACNC: 3.66 UIU/ML (ref 0.45–4.5)
UROBILINOGEN UR STRIP-ACNC: <2 MG/DL
WBC # BLD AUTO: 6.17 THOUSAND/UL (ref 4.31–10.16)

## 2023-03-31 ENCOUNTER — OFFICE VISIT (OUTPATIENT)
Dept: FAMILY MEDICINE CLINIC | Facility: CLINIC | Age: 78
End: 2023-03-31

## 2023-03-31 VITALS
BODY MASS INDEX: 28.1 KG/M2 | TEMPERATURE: 97.1 F | HEART RATE: 61 BPM | OXYGEN SATURATION: 98 % | HEIGHT: 73 IN | SYSTOLIC BLOOD PRESSURE: 130 MMHG | WEIGHT: 212 LBS | DIASTOLIC BLOOD PRESSURE: 70 MMHG

## 2023-03-31 DIAGNOSIS — N17.9 AKI (ACUTE KIDNEY INJURY) (HCC): ICD-10-CM

## 2023-03-31 DIAGNOSIS — R73.9 HYPERGLYCEMIA: ICD-10-CM

## 2023-03-31 DIAGNOSIS — I10 BENIGN ESSENTIAL HYPERTENSION: ICD-10-CM

## 2023-03-31 DIAGNOSIS — E78.00 HYPERCHOLESTEROLEMIA: ICD-10-CM

## 2023-03-31 DIAGNOSIS — N18.31 STAGE 3A CHRONIC KIDNEY DISEASE (HCC): Primary | ICD-10-CM

## 2023-03-31 DIAGNOSIS — E55.9 VITAMIN D DEFICIENCY: ICD-10-CM

## 2023-03-31 DIAGNOSIS — I65.22 STENOSIS OF LEFT CAROTID ARTERY: ICD-10-CM

## 2023-03-31 PROBLEM — N18.2 STAGE 2 CHRONIC KIDNEY DISEASE: Status: RESOLVED | Noted: 2020-09-09 | Resolved: 2023-03-31

## 2023-03-31 NOTE — PROGRESS NOTES
Assessment/Plan:       Problem List Items Addressed This Visit        Cardiovascular and Mediastinum    Benign essential hypertension    Relevant Orders    CBC and differential    Comprehensive metabolic panel    Stenosis of left carotid artery    Relevant Orders    VAS carotid complete study       Genitourinary    Stage 3a chronic kidney disease ([facility] [address] ) - Primary    Relevant Orders    CBC and differential    Comprehensive metabolic panel    Basic metabolic panel    [facility]     Creatinine did bump back up to 1 4  Repeat creatinine 1 month and prior to follow-up visit  Consider nephrology evaluation should he continue to fluctuate  Relevant Orders    CBC and differential    Comprehensive metabolic panel    Basic metabolic panel       Other    Hypercholesterolemia    Relevant Orders    Comprehensive metabolic panel    Vitamin D deficiency    Relevant Orders    Vitamin D 25 hydroxy    Hyperglycemia    Relevant Orders    Hemoglobin A1C         Subjective:      Patient ID: Josué Rinaldi is a 68 y o  male  HPI patient presents today for follow-up for chronic health issues  Recent blood work did show some increase in his renal insufficiency  He had an acute kidney injury in December with creatinine bumping up to 1 56  He avoids NSAIDs  He notes his blood pressure readings at home are high, but his blood pressure cuff does seem to read at least 20 points high in the office today  He had a syncopal episode in December after exercise and taking a hot shower  Fortunately, he said no significant lightheadedness since  He has concentrated on maintaining good hydration      The following portions of the patient's history were reviewed and updated as appropriate: allergies, current medications, past family history, past medical history, past social history, past surgical history and problem list       Current Outpatient Medications:   •  amLODIPine (NORVASC) 5 mg tablet, Take 1 tablet (5 "mg total) by mouth daily at bedtime, Disp: 90 tablet, Rfl: 3  •  ascorbic acid (VITAMIN C) 1000 MG tablet, Take 1,000 mg by mouth, Disp: , Rfl:   •  cetirizine (ZyrTEC) 10 mg tablet, Take 1 tablet by mouth, Disp: , Rfl:   •  Cholecalciferol (Vitamin D3) 50 MCG (2000 UT) capsule, Take 1 capsule (2,000 Units total) by mouth daily, Disp: , Rfl: 0  •  DAILY MULTIPLE VITAMINS tablet, Take 1 tablet by mouth daily, Disp: , Rfl:   •  Diclofenac Sodium (VOLTAREN) 1 %, Apply 2 g topically 4 (four) times a day, Disp: 2 g, Rfl: 2  •  fluticasone (FLONASE) 50 mcg/act nasal spray, SHAKE LIQUID AND USE 2 SPRAYS IN EACH NOSTRIL DAILY, Disp: 48 g, Rfl: 11  •  glucosamine-chondroitin 500-400 MG tablet, Take 1 tablet by mouth daily, Disp: , Rfl:   •  losartan (COZAAR) 100 MG tablet, Take 1 tablet (100 mg total) by mouth daily, Disp: 90 tablet, Rfl: 3  •  rosuvastatin (CRESTOR) 10 MG tablet, Take 1 tablet (10 mg total) by mouth daily, Disp: 90 tablet, Rfl: 3  •  hydrocortisone 2 5 % cream, Apply topically 2 (two) times a day as needed for rash (Patient not taking: Reported on 12/13/2022), Disp: 30 g, Rfl: 5     Review of Systems   Constitutional: Negative for fatigue  HENT: Negative for trouble swallowing  Respiratory: Negative for chest tightness, shortness of breath and wheezing  Cardiovascular: Negative for chest pain, palpitations and leg swelling  Gastrointestinal: Negative for abdominal pain, constipation and diarrhea  Endocrine: Negative for polyuria  Genitourinary: Negative for difficulty urinating and flank pain  Musculoskeletal: Negative for arthralgias and myalgias  Skin: Negative for rash  Psychiatric/Behavioral: Negative for sleep disturbance           Objective:      /70 (BP Location: Left arm, Patient Position: Sitting, Cuff Size: Large)   Pulse 61   Temp (!) 97 1 °F (36 2 °C) (Tympanic)   Ht 6' 1\" (1 854 m)   Wt 96 2 kg (212 lb)   SpO2 98%   BMI 27 97 kg/m²          Physical Exam  Vitals " reviewed  Constitutional:       Appearance: He is well-developed  HENT:      Head: Normocephalic  Cardiovascular:      Rate and Rhythm: Regular rhythm  Heart sounds: Normal heart sounds  No murmur heard  Pulmonary:      Effort: No respiratory distress  Breath sounds: No wheezing or rales  Abdominal:      General: There is no distension  Tenderness: There is no abdominal tenderness  Skin:     Findings: No erythema or rash  Neurological:      Mental Status: He is alert and oriented to person, place, and time             Barbara Oliva MD

## 2023-03-31 NOTE — ASSESSMENT & PLAN NOTE
Creatinine did bump back up to 1 4  Repeat creatinine 1 month and prior to follow-up visit  Consider nephrology evaluation should he continue to fluctuate

## 2023-03-31 NOTE — ASSESSMENT & PLAN NOTE
This has been treated conservatively  Unfortunately, he still does have a lot of discomfort in the left wrist especially when he swims

## 2023-06-15 ENCOUNTER — OFFICE VISIT (OUTPATIENT)
Dept: CARDIOLOGY CLINIC | Facility: CLINIC | Age: 78
End: 2023-06-15
Payer: COMMERCIAL

## 2023-06-15 VITALS
BODY MASS INDEX: 27.63 KG/M2 | OXYGEN SATURATION: 98 % | HEART RATE: 64 BPM | HEIGHT: 73 IN | WEIGHT: 208.5 LBS | DIASTOLIC BLOOD PRESSURE: 88 MMHG | SYSTOLIC BLOOD PRESSURE: 144 MMHG

## 2023-06-15 DIAGNOSIS — I10 BENIGN ESSENTIAL HYPERTENSION: Primary | ICD-10-CM

## 2023-06-15 DIAGNOSIS — E78.00 HYPERCHOLESTEROLEMIA: ICD-10-CM

## 2023-06-15 DIAGNOSIS — R55 SYNCOPE, UNSPECIFIED SYNCOPE TYPE: ICD-10-CM

## 2023-06-15 PROCEDURE — 99212 OFFICE O/P EST SF 10 MIN: CPT | Performed by: INTERNAL MEDICINE

## 2023-06-15 NOTE — PROGRESS NOTES
Cardiology Follow Up    [address] [facility]  1945  [id number]  [address] [facility],[place] [address] CATH LAB  [id number]   Joseph Brady  [address]  [phone]    1  Benign essential hypertension        2  Hypercholesterolemia        3  Syncope, unspecified syncope type            Interval History: Cardiology follow-up  Patient was last seen by myself in 2018  He did suffer a syncopal event on 12/22 it was felt to be vasovagal   He has not had no recurrent syncope  He is still very active, he exercises regularly  He has no exertional symptoms, denies any chest pain or dyspnea  Compliant with low-cholesterol diet, lipids last checked total cholesterol 153, HDL 62, LDL 76 on medium intensity statin therapy  Compliant with low-sodium diet, blood pressure at home in the 130/70 range, today's is 144/88      Patient Active Problem List   Diagnosis   • Abnormal findings on diagnostic imaging of other specified body structures   • Allergic rhinitis   • Benign enlargement of prostate   • Benign essential hypertension   • Osteoarthritis   • HNP (herniated nucleus pulposus), lumbar   • Lumbar radiculopathy   • S/P lumbar laminectomy   • Actinic keratoses   • Left leg weakness   • Syncope and collapse   • Left shoulder pain   • Hypercholesterolemia   • Stenosis of left carotid artery   • Achilles tendinitis of right lower extremity   • Intrinsic eczema   • Vitamin D deficiency   • Tear of scapholunate ligament   • Hyperglycemia   • Stage 3a chronic kidney disease (HCC)   • MIAN (acute kidney injury) ([facility] [address] )     Past Medical History:   Diagnosis Date   • Benign essential hypertension    • Broken ribs    • Chest pain      Social History     Socioeconomic History   • Marital status: /Civil Union     Spouse name: Not on file   • Number of children: Not on file   • Years of education: Not on file   • Highest education level: Not on file   Occupational History   • Not on file   Tobacco Use   • Smoking status: Former     Types: Cigarettes     Start date: 56     Quit date:      Years since quittin 4   • Smokeless tobacco: Never   Vaping Use   • Vaping Use: Never used   Substance and Sexual Activity   • Alcohol use: Yes     Comment: Rare   • Drug use: Not Currently   • Sexual activity: Not on file   Other Topics Concern   • Not on file   Social History Narrative   • Not on file     Social Determinants of Health     Financial Resource Strain: Not on file   Food Insecurity: Not on file   Transportation Needs: Not on file   Physical Activity: Not on file   Stress: Not on file   Social Connections: Not on file   Intimate Partner Violence: Not on file   Housing Stability: Not on file      Family History   Problem Relation Age of Onset   • Colon cancer Mother    • Cancer Father    • Thyroid cancer Father    • Other Father         M[employer] TRAFFIC ACCIDENT   • Ovarian cancer Sister      Past Surgical History:   Procedure Laterality Date   • APPENDECTOMY     • LUMBAR LAMINECTOMY Bilateral 2018   • TONSILLECTOMY         Current Outpatient Medications:   •  amLODIPine (NORVASC) 5 mg tablet, Take 1 tablet (5 mg total) by mouth daily at bedtime, Disp: 90 tablet, Rfl: 3  •  ascorbic acid (VITAMIN C) 1000 MG tablet, Take 1,000 mg by mouth, Disp: , Rfl:   •  cetirizine (ZyrTEC) 10 mg tablet, Take 1 tablet by mouth, Disp: , Rfl:   •  Cholecalciferol (Vitamin D3) 50 MCG (2000 UT) capsule, Take 1 capsule (2,000 Units total) by mouth daily, Disp: , Rfl: 0  •  DAILY MULTIPLE VITAMINS tablet, Take 1 tablet by mouth daily, Disp: , Rfl:   •  Diclofenac Sodium (VOLTAREN) 1 %, Apply 2 g topically 4 (four) times a day, Disp: 2 g, Rfl: 2  •  fluticasone (FLONASE) 50 mcg/act nasal spray, SHAKE LIQUID AND USE 2 SPRAYS IN EACH NOSTRIL DAILY, Disp: 48 g, Rfl: 11  •  glucosamine-chondroitin 500-400 MG tablet, Take 1 tablet by mouth daily, Disp: , Rfl:   •  losartan (COZAAR) 100 MG tablet, Take 1 tablet (100 mg total) by mouth daily, Disp: 90 tablet, Rfl: 3  •  rosuvastatin (CRESTOR) 10 MG tablet, Take 1 tablet (10 mg total) by mouth daily, Disp: 90 tablet, Rfl: 3  •  hydrocortisone 2 5 % cream, Apply topically 2 (two) times a day as needed for rash (Patient not taking: Reported on 12/13/2022), Disp: 30 g, Rfl: 5  Allergies   Allergen Reactions   • Diazepam      Increased anger     • Gabapentin Other (See Comments)     Severe mood swings, anger       Labs:  Appointment on 03/14/2023   Component Date Value   • WBC 03/14/2023 6 17    • RBC 03/14/2023 5 02    • Hemoglobin 03/14/2023 15 4    • Hematocrit 03/14/2023 46 6    • MCV 03/14/2023 93    • MCH 03/14/2023 30 7    • MCHC 03/14/2023 33 0    • RDW 03/14/2023 13 3    • MPV 03/14/2023 11 3    • Platelets 01/57/9001 286    • nRBC 03/14/2023 0    • Neutrophils Relative 03/14/2023 62    • Immat GRANS % 03/14/2023 0    • Lymphocytes Relative 03/14/2023 25    • Monocytes Relative 03/14/2023 8    • Eosinophils Relative 03/14/2023 4    • Basophils Relative 03/14/2023 1    • Neutrophils Absolute 03/14/2023 3 80    • Immature Grans Absolute 03/14/2023 0 02    • Lymphocytes Absolute 03/14/2023 1 52    • Monocytes Absolute 03/14/2023 0 52    • Eosinophils Absolute 03/14/2023 0 27    • Basophils Absolute 03/14/2023 0 04    • Sodium 03/14/2023 137    • Potassium 03/14/2023 4 3    • Chloride 03/14/2023 106    • CO2 03/14/2023 27    • ANION GAP 03/14/2023 4    • BUN 03/14/2023 20    • Creatinine 03/14/2023 1 40 (H)    • Glucose, Fasting 03/14/2023 95    • Calcium 03/14/2023 10 0    • AST 03/14/2023 25    • ALT 03/14/2023 25    • Alkaline Phosphatase 03/14/2023 66    • Total Protein 03/14/2023 7 2    • Albumin 03/14/2023 4 1    • Total Bilirubin 03/14/2023 1 41 (H)    • eGFR 03/14/2023 48    • Hemoglobin A1C 03/14/2023 5 7 (H)    • EAG 03/14/2023 117    • Cholesterol 03/14/2023 157    • Triglycerides 03/14/2023 85    • HDL, Direct 03/14/2023 64    • LDL Calculated 03/14/2023 76    • TSH 3RD GENERATON 03/14/2023 3 660    • Color, UA 03/14/2023 Yellow    • Clarity, UA 03/14/2023 Clear    • Specific Gravity, UA 03/14/2023 1 020    • pH, UA 03/14/2023 5 5    • Leukocytes, UA 03/14/2023 Negative    • Nitrite, UA 03/14/2023 Negative    • Protein, UA 03/14/2023 Negative    • Glucose, UA 03/14/2023 Negative    • Ketones, UA 03/14/2023 Negative    • Urobilinogen, UA 03/14/2023 <2 0    • Bilirubin, UA 03/14/2023 Negative    • Occult Blood, UA 03/14/2023 Negative    • Vit D, 25-Hydroxy 03/14/2023 32 5    • PSA 03/14/2023 1 5      Imaging: No results found  Review of Systems:  Review of Systems   Constitutional: Negative for activity change and fatigue  Respiratory: Negative for apnea, shortness of breath, wheezing and stridor  Cardiovascular: Negative for chest pain, palpitations and leg swelling  Neurological: Negative for dizziness and syncope  Physical Exam:  Physical Exam  Vitals reviewed  Constitutional:       General: He is not in acute distress  Appearance: Normal appearance  He is normal weight  He is not ill-appearing, toxic-appearing or diaphoretic  Eyes:      General: No scleral icterus  Neck:      Vascular: No carotid bruit  Cardiovascular:      Rate and Rhythm: Normal rate and regular rhythm  Pulses: Normal pulses  Heart sounds: Normal heart sounds  No murmur heard  No friction rub  No gallop  Pulmonary:      Effort: Pulmonary effort is normal  No respiratory distress  Breath sounds: Normal breath sounds  No stridor  No wheezing, rhonchi or rales  Musculoskeletal:      Right lower leg: No edema  Left lower leg: No edema  Skin:     General: Skin is warm and dry  Capillary Refill: Capillary refill takes less than 2 seconds  Neurological:      Mental Status: He is alert and oriented to person, place, and time     Psychiatric:         Mood and Affect: Mood normal          Discussion/Summary: Vasovagal syncope, no clinical recurrences  He does have a first-degree AV block, however no bradycardia arrhythmias have been documented  Continue current medications, he is not on negative chronotropic drugs  Blood pressure and lipids adequate control  No new recommendations  Stress 2018  He did 9 minutes of Prudencio protocol achieving target rate, there was no EKG criteria for ischemia, and echocardiogram at that time revealed normal left ventricular systolic function with stage I diastolic function, with mild mitral and tricuspid insufficiency and estimated normal pulmonary pressures as suggested by Doppler criteria  This note was completed in part utilizing [employer] direct voice recognition software  Grammatical errors, random word insertion, spelling mistakes, and incomplete sentences may be an occasional consequence of the system secondary to software limitations, ambient noise and hardware issues  At the time of dictation, efforts were made to edit, clarify and /or correct errors  Please read the chart carefully and recognize, using context, where substitutions have occurred  If you have any questions or concerns about the context, text or information contained within the body of this dictation, please contact myself, the provider, for further clarification

## 2023-08-29 ENCOUNTER — APPOINTMENT (OUTPATIENT)
Dept: LAB | Age: 78
End: 2023-08-29
Payer: COMMERCIAL

## 2023-08-29 DIAGNOSIS — N18.31 STAGE 3A CHRONIC KIDNEY DISEASE (HCC): ICD-10-CM

## 2023-08-29 DIAGNOSIS — R73.9 HYPERGLYCEMIA: ICD-10-CM

## 2023-08-29 DIAGNOSIS — E78.00 HYPERCHOLESTEROLEMIA: ICD-10-CM

## 2023-08-29 DIAGNOSIS — N17.9 AKI (ACUTE KIDNEY INJURY) (HCC): ICD-10-CM

## 2023-08-29 DIAGNOSIS — E55.9 VITAMIN D DEFICIENCY: ICD-10-CM

## 2023-08-29 DIAGNOSIS — I10 BENIGN ESSENTIAL HYPERTENSION: ICD-10-CM

## 2023-08-29 LAB
25(OH)D3 SERPL-MCNC: 42 NG/ML (ref 30–100)
ALBUMIN SERPL BCP-MCNC: 4.2 G/DL (ref 3.5–5)
ALP SERPL-CCNC: 57 U/L (ref 34–104)
ALT SERPL W P-5'-P-CCNC: 14 U/L (ref 7–52)
ANION GAP SERPL CALCULATED.3IONS-SCNC: 8 MMOL/L
AST SERPL W P-5'-P-CCNC: 23 U/L (ref 13–39)
BASOPHILS # BLD AUTO: 0.08 THOUSANDS/ÂΜL (ref 0–0.1)
BASOPHILS NFR BLD AUTO: 1 % (ref 0–1)
BILIRUB SERPL-MCNC: 1.53 MG/DL (ref 0.2–1)
BUN SERPL-MCNC: 19 MG/DL (ref 5–25)
CALCIUM SERPL-MCNC: 9.7 MG/DL (ref 8.4–10.2)
CHLORIDE SERPL-SCNC: 106 MMOL/L (ref 96–108)
CO2 SERPL-SCNC: 28 MMOL/L (ref 21–32)
CREAT SERPL-MCNC: 1.28 MG/DL (ref 0.6–1.3)
EOSINOPHIL # BLD AUTO: 0.37 THOUSAND/ÂΜL (ref 0–0.61)
EOSINOPHIL NFR BLD AUTO: 6 % (ref 0–6)
ERYTHROCYTE [DISTWIDTH] IN BLOOD BY AUTOMATED COUNT: 13.6 % (ref 11.6–15.1)
EST. AVERAGE GLUCOSE BLD GHB EST-MCNC: 123 MG/DL
GFR SERPL CREATININE-BSD FRML MDRD: 53 ML/MIN/1.73SQ M
GLUCOSE P FAST SERPL-MCNC: 96 MG/DL (ref 65–99)
HBA1C MFR BLD: 5.9 %
HCT VFR BLD AUTO: 43.4 % (ref 36.5–49.3)
HGB BLD-MCNC: 14.6 G/DL (ref 12–17)
IMM GRANULOCYTES # BLD AUTO: 0.02 THOUSAND/UL (ref 0–0.2)
IMM GRANULOCYTES NFR BLD AUTO: 0 % (ref 0–2)
LYMPHOCYTES # BLD AUTO: 1.66 THOUSANDS/ÂΜL (ref 0.6–4.47)
LYMPHOCYTES NFR BLD AUTO: 25 % (ref 14–44)
MCH RBC QN AUTO: 31.3 PG (ref 26.8–34.3)
MCHC RBC AUTO-ENTMCNC: 33.6 G/DL (ref 31.4–37.4)
MCV RBC AUTO: 93 FL (ref 82–98)
MONOCYTES # BLD AUTO: 0.58 THOUSAND/ÂΜL (ref 0.17–1.22)
MONOCYTES NFR BLD AUTO: 9 % (ref 4–12)
NEUTROPHILS # BLD AUTO: 4.06 THOUSANDS/ÂΜL (ref 1.85–7.62)
NEUTS SEG NFR BLD AUTO: 59 % (ref 43–75)
NRBC BLD AUTO-RTO: 0 /100 WBCS
PLATELET # BLD AUTO: 282 THOUSANDS/UL (ref 149–390)
PMV BLD AUTO: 11.4 FL (ref 8.9–12.7)
POTASSIUM SERPL-SCNC: 4.7 MMOL/L (ref 3.5–5.3)
PROT SERPL-MCNC: 6.6 G/DL (ref 6.4–8.4)
RBC # BLD AUTO: 4.66 MILLION/UL (ref 3.88–5.62)
SODIUM SERPL-SCNC: 142 MMOL/L (ref 135–147)
WBC # BLD AUTO: 6.77 THOUSAND/UL (ref 4.31–10.16)

## 2023-08-29 PROCEDURE — 82306 VITAMIN D 25 HYDROXY: CPT

## 2023-08-29 PROCEDURE — 83036 HEMOGLOBIN GLYCOSYLATED A1C: CPT

## 2023-08-29 PROCEDURE — 85025 COMPLETE CBC W/AUTO DIFF WBC: CPT

## 2023-08-29 PROCEDURE — 36415 COLL VENOUS BLD VENIPUNCTURE: CPT

## 2023-08-29 PROCEDURE — 80053 COMPREHEN METABOLIC PANEL: CPT

## 2023-09-28 DIAGNOSIS — I10 BENIGN ESSENTIAL HTN: ICD-10-CM

## 2023-09-28 DIAGNOSIS — I10 HYPERTENSION, UNSPECIFIED TYPE: ICD-10-CM

## 2023-09-28 DIAGNOSIS — J30.9 ALLERGIC RHINITIS, UNSPECIFIED SEASONALITY, UNSPECIFIED TRIGGER: ICD-10-CM

## 2023-09-28 DIAGNOSIS — E78.00 HYPERCHOLESTEROLEMIA: ICD-10-CM

## 2023-09-28 RX ORDER — FLUTICASONE PROPIONATE 50 MCG
SPRAY, SUSPENSION (ML) NASAL
Qty: 48 G | Refills: 11 | Status: SHIPPED | OUTPATIENT
Start: 2023-09-28

## 2023-09-28 RX ORDER — LOSARTAN POTASSIUM 100 MG/1
100 TABLET ORAL DAILY
Qty: 90 TABLET | Refills: 3 | Status: SHIPPED | OUTPATIENT
Start: 2023-09-28 | End: 2023-10-02 | Stop reason: SDUPTHER

## 2023-09-28 RX ORDER — AMLODIPINE BESYLATE 5 MG/1
5 TABLET ORAL DAILY
Qty: 90 TABLET | Refills: 3 | Status: SHIPPED | OUTPATIENT
Start: 2023-09-28 | End: 2023-10-02 | Stop reason: SDUPTHER

## 2023-09-28 RX ORDER — ROSUVASTATIN CALCIUM 10 MG/1
10 TABLET, COATED ORAL DAILY
Qty: 90 TABLET | Refills: 3 | Status: SHIPPED | OUTPATIENT
Start: 2023-09-28 | End: 2023-10-02 | Stop reason: SDUPTHER

## 2023-10-02 ENCOUNTER — OFFICE VISIT (OUTPATIENT)
Dept: FAMILY MEDICINE CLINIC | Facility: CLINIC | Age: 78
End: 2023-10-02
Payer: COMMERCIAL

## 2023-10-02 VITALS
RESPIRATION RATE: 18 BRPM | DIASTOLIC BLOOD PRESSURE: 82 MMHG | TEMPERATURE: 97.5 F | HEIGHT: 73 IN | BODY MASS INDEX: 26.72 KG/M2 | HEART RATE: 61 BPM | SYSTOLIC BLOOD PRESSURE: 126 MMHG | WEIGHT: 201.6 LBS | OXYGEN SATURATION: 99 %

## 2023-10-02 DIAGNOSIS — J30.9 ALLERGIC RHINITIS, UNSPECIFIED SEASONALITY, UNSPECIFIED TRIGGER: ICD-10-CM

## 2023-10-02 DIAGNOSIS — R73.9 HYPERGLYCEMIA: ICD-10-CM

## 2023-10-02 DIAGNOSIS — N18.31 STAGE 3A CHRONIC KIDNEY DISEASE (HCC): ICD-10-CM

## 2023-10-02 DIAGNOSIS — S63.8X9A TEAR OF SCAPHOLUNATE LIGAMENT: ICD-10-CM

## 2023-10-02 DIAGNOSIS — I10 HYPERTENSION, UNSPECIFIED TYPE: ICD-10-CM

## 2023-10-02 DIAGNOSIS — I10 BENIGN ESSENTIAL HTN: ICD-10-CM

## 2023-10-02 DIAGNOSIS — I10 BENIGN ESSENTIAL HYPERTENSION: Primary | ICD-10-CM

## 2023-10-02 DIAGNOSIS — I65.22 STENOSIS OF LEFT CAROTID ARTERY: ICD-10-CM

## 2023-10-02 DIAGNOSIS — E78.00 HYPERCHOLESTEROLEMIA: ICD-10-CM

## 2023-10-02 DIAGNOSIS — Z98.890 S/P LUMBAR LAMINECTOMY: ICD-10-CM

## 2023-10-02 PROCEDURE — 99214 OFFICE O/P EST MOD 30 MIN: CPT | Performed by: FAMILY MEDICINE

## 2023-10-02 RX ORDER — ROSUVASTATIN CALCIUM 10 MG/1
10 TABLET, COATED ORAL DAILY
Qty: 90 TABLET | Refills: 3 | Status: SHIPPED | OUTPATIENT
Start: 2023-10-02

## 2023-10-02 RX ORDER — AMLODIPINE BESYLATE 5 MG/1
5 TABLET ORAL DAILY
Qty: 90 TABLET | Refills: 3 | Status: SHIPPED | OUTPATIENT
Start: 2023-10-02

## 2023-10-02 RX ORDER — LOSARTAN POTASSIUM 100 MG/1
100 TABLET ORAL DAILY
Qty: 90 TABLET | Refills: 3 | Status: SHIPPED | OUTPATIENT
Start: 2023-10-02

## 2023-10-02 NOTE — PATIENT INSTRUCTIONS
Meal Planning with Diabetes Exchanges   WHAT YOU NEED TO KNOW:   What do I need to know about diabetes exchanges? Exchanges are servings of food that contain similar amounts of carbohydrate, fat, protein, and calories within a food group. The exchanges can be used to develop a healthy meal plan that helps to keep your blood sugar within the recommended levels. A meal plan with the right amount of carbohydrates is especially important. Your blood sugar naturally rises after you eat carbohydrates. Too many carbohydrates in 1 meal or snack can raise your blood sugar level. Carbohydrates are found in starches, fruit, milk, yogurt, and sweets. How do I create a meal plan with exchanges? A dietitian will work with you to develop a healthy meal plan that is right for you. This meal plan will include the amount of exchanges you can have from each food group throughout the day. Follow your meal plan by keeping track of the amount of exchanges you eat for each meal and snack. Your meal plan will be based on your age, weight, blood sugar levels, medicine, and activity level. What are the starch food group exchanges? Each exchange below contains about 15 grams of carbohydrate , 3 grams of protein, 1 gram of fat, and 80 calories. 1 ounce of white, whole wheat or rye bread (1 slice)    1 ounce of bagel (about ¼ of a bagel)    1 6-inch flour or corn tortilla or 1 4-inch pancake (about ¼ inch thick)    ? cup of cooked pasta or rice    ¾ cup of dry, ready-to-eat cereal with no sugar added    ½ cup of cooked cereal, such as oatmeal    3 yarely cracker squares or 8 animal crackers    6 saltine-type crackers    3 cups of popcorn or ¾ ounce of pretzels    Starchy vegetables and cooked legumes:      ½ cup of corn, green peas, sweet potatoes, or mashed potatoes    ¼ of a large baked potato    1 cup of acorn, butternut squash, or pumpkin    ½ cup of beans, lentils, or peas (such as cox, kidney, or black-eyed)    ?  cup of lima beans    What are the fruit group exchanges? Each exchange contains about 15 grams of carbohydrate  and 60 calories. 1 small (4 ounce) apple, banana orange, or nectarine    ½ cup of canned or fresh fruit    ½ cup (4 ounces) of unsweetened fruit juice    2 tablespoons of dried fruit    What are the milk group exchanges? Each exchange contains about 12 grams of carbohydrate  and 8 grams of protein. The amount of fat and calories in each serving depends on the type of milk (such as whole, low-fat, or fat-free). 1 cup fat-free or low-fat milk    ¾ cup of plain, nonfat yogurt    1 cup fat-free, flavored yogurt with artificial (no calorie) sweetener    What are the non-starchy vegetable group exchanges? Each exchange contains about 5 grams of carbohydrate , 2 grams of protein, and 25 calories. Examples include beets, broccoli, cabbage, carrots, cauliflower, cucumber, mushrooms, tomatoes, and zucchini. ½ cup of cooked vegetables or 1 cup of raw vegetables    ½ cup of vegetable juice    What are the meat and meat substitute group exchanges? Each exchange of a lean meat  listed below contains about 7 grams of protein, 0 to 3 grams of fat, and 45 calories. The meat and meat substitutes food group does not contain any carbohydrates. Medium and high-fat meats have more calories. 1 ounce of chicken or turkey without skin, or 1 ounce of fish (not breaded or fried)    1 ounce of lean beef, pork, or lamb    1-inch cube or 1 ounce of low-fat cheese    2 egg whites or ¼ cup of egg substitute    ½ cup of tofu    What are the sweets, desserts, and other carbohydrate group exchanges? Sweets and other desserts:  Each exchange has about 15 grams of carbohydrate .     1 ounce of yolanda food cake or 2-inch square cake (unfrosted)    2 small cookies    ½ cup of sugar-free, fat-free ice cream    1 tablespoon of syrup, jam, jelly, table sugar, or honey    Combination foods:     1 cup of an entrée, such as lasagna, spaghetti with meatballs, macaroni and cheese, and chili with beans (each serving counts as 2 carbohydrate exchanges )     1 cup of tomato or vegetable beef soup (each serving counts as 1 carbohydrate exchange )    What are the fat group exchanges? Each exchange contains 5 grams of fat and 45 calories. 1 teaspoon of oil (such as canola, olive, or corn oil)    6 almonds or cashews, 10 peanuts, or 4 pecan halves    2 tablespoons of avocado    ½ tablespoon of peanut butter    1 teaspoon of regular margarine or 2 teaspoons of low-fat margarine    1 teaspoon of regular butter or 1 tablespoon of low-fat butter    1 teaspoon of regular mayonnaise or 1 tablespoon of low-fat mayonnaise    1 tablespoon of regular salad dressing or 2 tablespoons of low-fat salad dressing    What are free foods? The foods on this list are called free foods because they have very few calories. Free foods usually do not increase your blood sugar if you limit them. 1 tablespoon of catsup or taco sauce    ¼ cup of salsa    2 tablespoons of sugar-free syrup or 2 teaspoons of light jam or jelly    1 tablespoon of fat-free salad dressing    4 tablespoons of fat-free margarine or fat-free mayonnaise    Sugar-free drinks: diet soda, sugar-free drink mixes, or mineral water    Low-sodium bouillon or fat-free broth    Mustard    Seasonings such as spices, herbs, and garlic    Sugar-free gelatin without added fruit    What other healthy nutrition guidelines should I follow? Limit drinks with sugar substitutes. Your dietitian or healthcare provider will encourage you to drink water. Water helps your kidneys to function properly. Ask how much water you should drink every day. Eat more fiber. Choose foods that are good sources of fiber, such as fruits, vegetables, and whole grains. Cereals that contain 5 or more grams of fiber per serving are good sources of fiber. Legumes such as garbanzo, cox beans, kidney beans, and lentils are also good sources. Limit fat. Ask your dietitian or healthcare provider how much fat you should eat each day. Choose foods low in fat, saturated fat, trans fat, and cholesterol. Examples include turkey or chicken without the skin, fish, lean cuts of meat, and beans. Low-fat dairy foods, such as low-fat or fat-free milk and low-fat yogurt are also good choices. Omega-3 fatty acids are healthy fats that are found in canola oil, soybean oil and fatty fish. Allen, albacore tuna, and sardines are good sources of omega 3 fatty acids. Eat 2 servings of these types of fish each week. Do not eat fried fish. Limit sugar. Sugar and sweets must be counted toward the carbohydrate exchanges that you can have within your meal plan. Limit sugar and sweets because they are usually also high in calories and fat. Eat smaller portions of sweets by sharing a dessert or asking for a child-size portion at a restaurant. Limit sodium  (salt) to about 2,300 mg per day. You may need to eat even less sodium if you have certain medical conditions. Foods high in sodium include soy sauce, potato chips, and soup. Limit alcohol. Ask your healthcare provider if it is safe for you to drink alcohol. If alcohol is safe for you to have, eat a meal when you drink alcohol. If you drink alcohol on an empty stomach, your blood sugar may drop to a low level. Women 21 years or older and men 72 years or older should limit alcohol to 1 drink a day. Men aged 24 to 59 years should limit alcohol to 2 drinks a day. A drink of alcohol is 5 ounces of wine, 12 ounces of beer, or 1½ ounces of liquor. What else can I do to manage diabetes? Control your blood sugar level. Test your blood sugar level regularly and keep a record of the results. Ask your healthcare provider when and how often to test your blood sugar. You may need to check your blood sugar level at least 3 times each day. Talk to your healthcare provider about your weight.   Ask if you need to lose weight, and how much you need to lose. If you are overweight, you may need to make other changes to lose weight. Ask your healthcare provider to help you create a weight loss program.    Get regular physical activity. Physical activity can help decrease your blood sugar level. It can also help to decrease your risk for heart disease and help you lose weight. Adults should have moderate intensity physical activity for at least 150 minutes every week. Spread the amount of activity over at least 3 days a week. Do not skip more than 2 days in a row. Children should get at least 60 minutes of moderate physical activity on most days of the week. Examples of moderate physical activity include brisk walking, running, and swimming. Do not sit for longer than 30 minutes. Work with your healthcare provider to create a plan for physical activity. When should I call my doctor? You have high blood sugar levels during a certain time of day, or almost all of the time. You often have low blood sugar levels. You have questions or concerns about your condition or care. CARE AGREEMENT:   You have the right to help plan your care. Discuss treatment options with your healthcare provider to decide what care you want to receive. You always have the right to refuse treatment. The above information is an  only. It is not intended as medical advice for individual conditions or treatments. Talk to your doctor, nurse or pharmacist before following any medical regimen to see if it is safe and effective for you. © Copyright Vanessa Mar 2023 Information is for End User's use only and may not be sold, redistributed or otherwise used for commercial purposes.

## 2023-10-02 NOTE — PROGRESS NOTES
Assessment/Plan:       Problem List Items Addressed This Visit        Respiratory    Allergic rhinitis     Stable at this time. Cardiovascular and Mediastinum    Benign essential hypertension - Primary    Relevant Medications    amLODIPine (NORVASC) 5 mg tablet    losartan (COZAAR) 100 MG tablet    Other Relevant Orders    CBC and differential    Comprehensive metabolic panel    Stenosis of left carotid artery    Relevant Orders    Lipid Panel with Direct LDL reflex       Musculoskeletal and Integument    Tear of scapholunate ligament       Genitourinary    Stage 3a chronic kidney disease (HCC)    Relevant Orders    CBC and differential    Comprehensive metabolic panel       Other    S/P lumbar laminectomy    Hypercholesterolemia    Relevant Medications    rosuvastatin (CRESTOR) 10 MG tablet    Other Relevant Orders    CBC and differential    Comprehensive metabolic panel    Lipid Panel with Direct LDL reflex    Hyperglycemia    Relevant Orders    Hemoglobin A1C   Other Visit Diagnoses     Hypertension, unspecified type        Relevant Medications    amLODIPine (NORVASC) 5 mg tablet    losartan (COZAAR) 100 MG tablet    Other Relevant Orders    CBC and differential    Comprehensive metabolic panel    Benign essential HTN        Relevant Medications    amLODIPine (NORVASC) 5 mg tablet    losartan (COZAAR) 100 MG tablet            Subjective:      Patient ID: Rochelle Dolan is a 66 y.o. male. HPI patient presents here for follow-up of chronic health issues. Overall, he feels pretty well. He has some chronic diffuse arthralgias but is managing. He denies any present chest pain, shortness of breath or palpitations. He does have some chronic low back issues are stable at this time.       The following portions of the patient's history were reviewed and updated as appropriate: allergies, current medications, past family history, past medical history, past social history, past surgical history and problem list.      Current Outpatient Medications:   •  amLODIPine (NORVASC) 5 mg tablet, Take 1 tablet (5 mg total) by mouth daily, Disp: 90 tablet, Rfl: 3  •  ascorbic acid (VITAMIN C) 1000 MG tablet, Take 1,000 mg by mouth, Disp: , Rfl:   •  cetirizine (ZyrTEC) 10 mg tablet, Take 1 tablet by mouth, Disp: , Rfl:   •  Cholecalciferol (Vitamin D3) 50 MCG (2000 UT) capsule, Take 1 capsule (2,000 Units total) by mouth daily, Disp: , Rfl: 0  •  DAILY MULTIPLE VITAMINS tablet, Take 1 tablet by mouth daily, Disp: , Rfl:   •  Diclofenac Sodium (VOLTAREN) 1 %, Apply 2 g topically 4 (four) times a day, Disp: 2 g, Rfl: 2  •  fluticasone (FLONASE) 50 mcg/act nasal spray, SHAKE LIQUID AND USE 2 SPRAYS IN EACH NOSTRIL DAILY, Disp: 48 g, Rfl: 11  •  glucosamine-chondroitin 500-400 MG tablet, Take 1 tablet by mouth daily, Disp: , Rfl:   •  losartan (COZAAR) 100 MG tablet, Take 1 tablet (100 mg total) by mouth daily, Disp: 90 tablet, Rfl: 3  •  rosuvastatin (CRESTOR) 10 MG tablet, Take 1 tablet (10 mg total) by mouth daily, Disp: 90 tablet, Rfl: 3     Review of Systems   Constitutional: Negative for appetite change, chills, fatigue, fever and unexpected weight change. HENT: Negative for trouble swallowing. Eyes: Negative for visual disturbance. Respiratory: Negative for cough, chest tightness, shortness of breath and wheezing. Cardiovascular: Negative for chest pain, palpitations and leg swelling. Gastrointestinal: Negative for abdominal distention, abdominal pain, blood in stool, constipation and diarrhea. Endocrine: Negative for polyuria. Genitourinary: Negative for difficulty urinating and flank pain. Musculoskeletal: Negative for arthralgias and myalgias. Skin: Negative for rash. Neurological: Negative for dizziness, weakness and light-headedness. Hematological: Negative for adenopathy. Does not bruise/bleed easily. Psychiatric/Behavioral: Negative for dysphoric mood and sleep disturbance.  The patient is not nervous/anxious. Objective:      /82 (BP Location: Left arm, Patient Position: Sitting, Cuff Size: Standard)   Pulse 61   Temp 97.5 °F (36.4 °C) (Tympanic)   Resp 18   Ht 6' 1" (1.854 m)   Wt 91.4 kg (201 lb 9.6 oz)   SpO2 99%   BMI 26.60 kg/m²          Physical Exam  Vitals reviewed. Constitutional:       Appearance: He is well-developed. HENT:      Head: Normocephalic. Cardiovascular:      Rate and Rhythm: Regular rhythm. Heart sounds: Normal heart sounds. No murmur heard. Pulmonary:      Effort: No respiratory distress. Breath sounds: No wheezing or rales. Abdominal:      General: There is no distension. Tenderness: There is no abdominal tenderness. Musculoskeletal:      Right lower leg: No edema. Left lower leg: No edema. Skin:     Findings: No erythema or rash. Neurological:      Mental Status: He is alert and oriented to person, place, and time.            Kaylee Mack MD

## 2023-12-13 DIAGNOSIS — E78.00 HYPERCHOLESTEROLEMIA: ICD-10-CM

## 2023-12-13 RX ORDER — ROSUVASTATIN CALCIUM 10 MG/1
10 TABLET, COATED ORAL DAILY
Qty: 90 TABLET | Refills: 3 | Status: SHIPPED | OUTPATIENT
Start: 2023-12-13

## 2024-04-02 ENCOUNTER — APPOINTMENT (OUTPATIENT)
Dept: LAB | Age: 79
End: 2024-04-02
Payer: COMMERCIAL

## 2024-04-02 DIAGNOSIS — N18.31 STAGE 3A CHRONIC KIDNEY DISEASE (HCC): ICD-10-CM

## 2024-04-02 DIAGNOSIS — I10 BENIGN ESSENTIAL HYPERTENSION: ICD-10-CM

## 2024-04-02 DIAGNOSIS — E78.00 HYPERCHOLESTEROLEMIA: ICD-10-CM

## 2024-04-02 DIAGNOSIS — R73.9 HYPERGLYCEMIA: ICD-10-CM

## 2024-04-02 DIAGNOSIS — I65.22 STENOSIS OF LEFT CAROTID ARTERY: ICD-10-CM

## 2024-04-02 DIAGNOSIS — I10 HYPERTENSION, UNSPECIFIED TYPE: ICD-10-CM

## 2024-04-02 LAB
ALBUMIN SERPL BCP-MCNC: 4.2 G/DL (ref 3.5–5)
ALP SERPL-CCNC: 52 U/L (ref 34–104)
ALT SERPL W P-5'-P-CCNC: 14 U/L (ref 7–52)
ANION GAP SERPL CALCULATED.3IONS-SCNC: 8 MMOL/L (ref 4–13)
AST SERPL W P-5'-P-CCNC: 24 U/L (ref 13–39)
BASOPHILS # BLD AUTO: 0.07 THOUSANDS/ÂΜL (ref 0–0.1)
BASOPHILS NFR BLD AUTO: 1 % (ref 0–1)
BILIRUB SERPL-MCNC: 1.48 MG/DL (ref 0.2–1)
BUN SERPL-MCNC: 17 MG/DL (ref 5–25)
CALCIUM SERPL-MCNC: 9.2 MG/DL (ref 8.4–10.2)
CHLORIDE SERPL-SCNC: 105 MMOL/L (ref 96–108)
CHOLEST SERPL-MCNC: 146 MG/DL
CO2 SERPL-SCNC: 29 MMOL/L (ref 21–32)
CREAT SERPL-MCNC: 1.22 MG/DL (ref 0.6–1.3)
EOSINOPHIL # BLD AUTO: 0.36 THOUSAND/ÂΜL (ref 0–0.61)
EOSINOPHIL NFR BLD AUTO: 5 % (ref 0–6)
ERYTHROCYTE [DISTWIDTH] IN BLOOD BY AUTOMATED COUNT: 13.4 % (ref 11.6–15.1)
EST. AVERAGE GLUCOSE BLD GHB EST-MCNC: 120 MG/DL
GFR SERPL CREATININE-BSD FRML MDRD: 56 ML/MIN/1.73SQ M
GLUCOSE P FAST SERPL-MCNC: 95 MG/DL (ref 65–99)
HBA1C MFR BLD: 5.8 %
HCT VFR BLD AUTO: 45.7 % (ref 36.5–49.3)
HDLC SERPL-MCNC: 63 MG/DL
HGB BLD-MCNC: 14.9 G/DL (ref 12–17)
IMM GRANULOCYTES # BLD AUTO: 0.03 THOUSAND/UL (ref 0–0.2)
IMM GRANULOCYTES NFR BLD AUTO: 0 % (ref 0–2)
LDLC SERPL CALC-MCNC: 64 MG/DL (ref 0–100)
LYMPHOCYTES # BLD AUTO: 1.74 THOUSANDS/ÂΜL (ref 0.6–4.47)
LYMPHOCYTES NFR BLD AUTO: 25 % (ref 14–44)
MCH RBC QN AUTO: 30.8 PG (ref 26.8–34.3)
MCHC RBC AUTO-ENTMCNC: 32.6 G/DL (ref 31.4–37.4)
MCV RBC AUTO: 94 FL (ref 82–98)
MONOCYTES # BLD AUTO: 0.54 THOUSAND/ÂΜL (ref 0.17–1.22)
MONOCYTES NFR BLD AUTO: 8 % (ref 4–12)
NEUTROPHILS # BLD AUTO: 4.34 THOUSANDS/ÂΜL (ref 1.85–7.62)
NEUTS SEG NFR BLD AUTO: 61 % (ref 43–75)
NRBC BLD AUTO-RTO: 0 /100 WBCS
PLATELET # BLD AUTO: 273 THOUSANDS/UL (ref 149–390)
PMV BLD AUTO: 11.4 FL (ref 8.9–12.7)
POTASSIUM SERPL-SCNC: 4.5 MMOL/L (ref 3.5–5.3)
PROT SERPL-MCNC: 6.8 G/DL (ref 6.4–8.4)
RBC # BLD AUTO: 4.84 MILLION/UL (ref 3.88–5.62)
SODIUM SERPL-SCNC: 142 MMOL/L (ref 135–147)
TRIGL SERPL-MCNC: 95 MG/DL
WBC # BLD AUTO: 7.08 THOUSAND/UL (ref 4.31–10.16)

## 2024-04-02 PROCEDURE — 80061 LIPID PANEL: CPT

## 2024-04-02 PROCEDURE — 36415 COLL VENOUS BLD VENIPUNCTURE: CPT

## 2024-04-02 PROCEDURE — 83036 HEMOGLOBIN GLYCOSYLATED A1C: CPT

## 2024-04-02 PROCEDURE — 80053 COMPREHEN METABOLIC PANEL: CPT

## 2024-04-02 PROCEDURE — 85025 COMPLETE CBC W/AUTO DIFF WBC: CPT

## 2024-04-05 ENCOUNTER — OFFICE VISIT (OUTPATIENT)
Dept: FAMILY MEDICINE CLINIC | Facility: CLINIC | Age: 79
End: 2024-04-05
Payer: COMMERCIAL

## 2024-04-05 VITALS
RESPIRATION RATE: 14 BRPM | SYSTOLIC BLOOD PRESSURE: 132 MMHG | HEIGHT: 73 IN | DIASTOLIC BLOOD PRESSURE: 78 MMHG | OXYGEN SATURATION: 97 % | WEIGHT: 210.2 LBS | BODY MASS INDEX: 27.86 KG/M2 | HEART RATE: 60 BPM

## 2024-04-05 DIAGNOSIS — N18.31 STAGE 3A CHRONIC KIDNEY DISEASE (HCC): ICD-10-CM

## 2024-04-05 DIAGNOSIS — E55.9 VITAMIN D DEFICIENCY: ICD-10-CM

## 2024-04-05 DIAGNOSIS — I10 BENIGN ESSENTIAL HYPERTENSION: ICD-10-CM

## 2024-04-05 DIAGNOSIS — I65.22 STENOSIS OF LEFT CAROTID ARTERY: ICD-10-CM

## 2024-04-05 DIAGNOSIS — Z00.00 WELL ADULT ON ROUTINE HEALTH CHECK: Primary | ICD-10-CM

## 2024-04-05 DIAGNOSIS — E78.00 HYPERCHOLESTEROLEMIA: ICD-10-CM

## 2024-04-05 DIAGNOSIS — R73.9 HYPERGLYCEMIA: ICD-10-CM

## 2024-04-05 DIAGNOSIS — L20.84 INTRINSIC ECZEMA: ICD-10-CM

## 2024-04-05 PROBLEM — N17.9 AKI (ACUTE KIDNEY INJURY) (HCC): Status: RESOLVED | Noted: 2022-12-05 | Resolved: 2024-04-05

## 2024-04-05 PROCEDURE — 99214 OFFICE O/P EST MOD 30 MIN: CPT | Performed by: FAMILY MEDICINE

## 2024-04-05 PROCEDURE — 99397 PER PM REEVAL EST PAT 65+ YR: CPT | Performed by: FAMILY MEDICINE

## 2024-04-05 NOTE — ASSESSMENT & PLAN NOTE
Lab Results   Component Value Date    EGFR 56 04/02/2024    EGFR 53 08/29/2023    EGFR 48 03/14/2023    CREATININE 1.22 04/02/2024    CREATININE 1.28 08/29/2023    CREATININE 1.40 (H) 03/14/2023   He avoids NSAIDs at this time.  Labs are stable.

## 2024-04-05 NOTE — ASSESSMENT & PLAN NOTE
He would like to hold off on further PSA monitoring since he is over 75.  Fortunately, he is having no significant voiding issues.

## 2024-04-05 NOTE — PROGRESS NOTES
ADULT ANNUAL PHYSICAL  [facility] PRIMARY CARE    NAME: Sivakumar Calix  AGE: 78 y.o. SEX: male  : 1945     DATE: 2024     Assessment and Plan:     Problem List Items Addressed This Visit        Cardiovascular and Mediastinum    Benign essential hypertension     Well-controlled at this time.         Relevant Orders    CBC and differential    Comprehensive metabolic panel    Stenosis of left carotid artery     Consider repeat next year.  Continue with risk factor control.         Relevant Orders    Comprehensive metabolic panel    Lipid Panel with Direct LDL reflex       Musculoskeletal and Integument    Intrinsic eczema    Relevant Orders    TSH, 3rd generation with Free T4 reflex       Genitourinary    Stage 3a chronic kidney disease (HCC)     Lab Results   Component Value Date    EGFR 56 2024    EGFR 53 2023    EGFR 48 2023    CREATININE 1.22 2024    CREATININE 1.28 2023    CREATININE 1.40 (H) 2023   He avoids NSAIDs at this time.  Labs are stable.            Other    Hypercholesterolemia    Relevant Orders    Comprehensive metabolic panel    Lipid Panel with Direct LDL reflex    Vitamin D deficiency     Check due with next labs         Relevant Orders    Vitamin D 25 hydroxy    Hyperglycemia     Continue with routine exercise.  Monitor A1c.         Relevant Orders    Comprehensive metabolic panel    Hemoglobin A1C   Other Visit Diagnoses     Well adult on routine health check    -  Primary    Discussed that he never had Shingrix.  He will consider.  Declines RSV, flu and updated COVID shot.          Immunizations and preventive care screenings were discussed with patient today. Appropriate education was printed on patient's after visit summary.    Discussed risks and benefits of prostate cancer screening. We discussed the controversial history of PSA screening for prostate cancer in the [place] as well as the  risk of over detection and over treatment of prostate cancer by way of PSA screening.  The patient understands that PSA blood testing is an imperfect way to screen for prostate cancer and that elevated PSA levels in the blood may also be caused by infection, inflammation, prostatic trauma or manipulation, urological procedures, or by benign prostatic enlargement.    The role of the digital rectal examination in prostate cancer screening was also discussed and I discussed with him that there is large interobserver variability in the findings of digital rectal examination.    Counseling:  Alcohol/drug use: discussed moderation in alcohol intake, the recommendations for healthy alcohol use, and avoidance of illicit drug use.  Dental Health: discussed importance of regular tooth brushing, flossing, and dental visits.  Exercise: the importance of regular exercise/physical activity was discussed. Recommend exercise 3-5 times per week for at least 30 minutes.          No follow-ups on file.     Chief Complaint:     Chief Complaint   Patient presents with   • Physical Exam      History of Present Illness:     Adult Annual Physical   Patient here for a comprehensive physical exam. The patient reports  feeling well.  He remains quite active monitoring sports and going to activities such as [employer].  He does try to exercise intermittently.  He denies any positive chest pain, shortness of breath, palpitation or lightheadedness. .  He has some chronic low back pain but has been avoiding NSAIDs due to history of some mild CKD.    Diet and Physical Activity  Diet/Nutrition: well balanced diet.   Exercise: moderate cardiovascular exercise.      Depression Screening  PHQ-2/9 Depression Screening    Little interest or pleasure in doing things: 0 - not at all  Feeling down, depressed, or hopeless: 0 - not at all  Trouble falling or staying asleep, or sleeping too much: 0 - not at all  Feeling tired or having little energy: 0  - not at all  Poor appetite or overeatin - not at all  Feeling bad about yourself - or that you are a failure or have let yourself or your family down: 0 - not at all  Trouble concentrating on things, such as reading the newspaper or watching television: 0 - not at all  Moving or speaking so slowly that other people could have noticed. Or the opposite - being so fidgety or restless that you have been moving around a lot more than usual: 0 - not at all  Thoughts that you would be better off dead, or of hurting yourself in some way: 0 - not at all  PHQ-2 Score: 0  PHQ-2 Interpretation: Negative depression screen  PHQ-9 Score: 0  PHQ-9 Interpretation: No or Minimal depression       General Health  Sleep: sleeps well.   Hearing: normal - bilateral.  Vision: goes for regular eye exams.   Dental: regular dental visits.        Health  Symptoms include: none    Advanced Care Planning  Do you have an advanced directive? yes  Do you have a durable medical power of ? yes  ACP document given to patient? no     Review of Systems:     Review of Systems   Constitutional:  Negative for appetite change, chills, fatigue, fever and unexpected weight change.   HENT:  Negative for trouble swallowing.    Eyes:  Negative for visual disturbance.   Respiratory:  Negative for cough, chest tightness, shortness of breath and wheezing.    Cardiovascular:  Negative for chest pain, palpitations and leg swelling.   Gastrointestinal:  Negative for abdominal distention, abdominal pain, blood in stool, constipation and diarrhea.   Endocrine: Negative for polyuria.   Genitourinary:  Negative for difficulty urinating and flank pain.   Musculoskeletal:  Positive for back pain. Negative for arthralgias and myalgias.   Skin:  Negative for rash.   Neurological:  Negative for dizziness and light-headedness.   Hematological:  Negative for adenopathy. Does not bruise/bleed easily.   Psychiatric/Behavioral:  Negative for dysphoric mood and sleep  disturbance. The patient is not nervous/anxious.       Past Medical History:     Past Medical History:   Diagnosis Date   • MIAN (acute kidney injury) (HCC) 2022    Creatinine bump to 1.56 after syncopal episode .   • Benign essential hypertension    • Broken ribs    • Chest pain       Past Surgical History:     Past Surgical History:   Procedure Laterality Date   • APPENDECTOMY     • LUMBAR LAMINECTOMY Bilateral 2018   • TONSILLECTOMY        Family History:     Family History   Problem Relation Age of Onset   • Colon cancer Mother    • Cancer Father    • Thyroid cancer Father    • Other Father         MOT[employer] TRAFFIC ACCIDENT   • Ovarian cancer Sister       Social History:     Social History     Socioeconomic History   • Marital status: /Civil Union     Spouse name: None   • Number of children: None   • Years of education: None   • Highest education level: None   Occupational History   • None   Tobacco Use   • Smoking status: Former     Current packs/day: 0.00     Types: Cigarettes     Start date:      Quit date:      Years since quittin.2   • Smokeless tobacco: Never   Vaping Use   • Vaping status: Never Used   Substance and Sexual Activity   • Alcohol use: Yes     Comment: Rare   • Drug use: Not Currently   • Sexual activity: None   Other Topics Concern   • None   Social History Narrative   • None     Social Determinants of Health     Financial Resource Strain: Not on file   Food Insecurity: Not on file   Transportation Needs: Not on file   Physical Activity: Not on file   Stress: Not on file   Social Connections: Not on file   Intimate Partner Violence: Not on file   Housing Stability: Not on file      Current Medications:     Current Outpatient Medications   Medication Sig Dispense Refill   • amLODIPine (NORVASC) 5 mg tablet Take 1 tablet (5 mg total) by mouth daily 90 tablet 3   • ascorbic acid (VITAMIN C) 1000 MG tablet Take 1,000 mg by mouth     • cetirizine (ZyrTEC) 10  "mg tablet Take 1 tablet by mouth     • Cholecalciferol (Vitamin D3) 50 MCG (2000 UT) capsule Take 1 capsule (2,000 Units total) by mouth daily  0   • DAILY MULTIPLE VITAMINS tablet Take 1 tablet by mouth daily     • Diclofenac Sodium (VOLTAREN) 1 % Apply 2 g topically 4 (four) times a day 2 g 2   • fluticasone (FLONASE) 50 mcg/act nasal spray SHAKE LIQUID AND USE 2 SPRAYS IN EACH NOSTRIL DAILY 48 g 11   • glucosamine-chondroitin 500-400 MG tablet Take 1 tablet by mouth daily     • losartan (COZAAR) 100 MG tablet Take 1 tablet (100 mg total) by mouth daily 90 tablet 3   • rosuvastatin (CRESTOR) 10 MG tablet TAKE 1 TABLET(10 MG) BY MOUTH DAILY 90 tablet 3     No current facility-administered medications for this visit.      Allergies:     Allergies   Allergen Reactions   • Diazepam      Increased anger.   • Gabapentin Other (See Comments)     Severe mood swings, anger      Physical Exam:     /78 (BP Location: Left arm, Patient Position: Sitting, Cuff Size: Standard)   Pulse 60   Resp 14   Ht 6' 1\" (1.854 m)   Wt 95.3 kg (210 lb 3.2 oz)   SpO2 97%   BMI 27.73 kg/m²     Physical Exam  Constitutional:       General: He is not in acute distress.     Appearance: Normal appearance. He is well-developed. He is not diaphoretic.   HENT:      Head: Normocephalic.      Right Ear: External ear normal.      Left Ear: External ear normal.      Nose: Nose normal.   Eyes:      General:         Right eye: No discharge.         Left eye: No discharge.      Conjunctiva/sclera: Conjunctivae normal.      Pupils: Pupils are equal, round, and reactive to light.   Neck:      Thyroid: No thyromegaly.      Trachea: No tracheal deviation.   Cardiovascular:      Rate and Rhythm: Normal rate and regular rhythm.      Heart sounds: Normal heart sounds. No murmur heard.     No friction rub.   Pulmonary:      Effort: Pulmonary effort is normal. No respiratory distress.      Breath sounds: Normal breath sounds. No wheezing.   Chest:      " Chest wall: No tenderness.   Abdominal:      General: There is no distension.      Palpations: There is no mass.      Tenderness: There is no abdominal tenderness. There is no guarding or rebound.      Hernia: No hernia is present.   Musculoskeletal:         General: No swelling or deformity.      Cervical back: Normal range of motion.      Right lower leg: No edema.      Left lower leg: No edema.   Skin:     Findings: No erythema or rash.   Neurological:      General: No focal deficit present.      Mental Status: He is alert.      Cranial Nerves: No cranial nerve deficit.      Coordination: Coordination normal.   Psychiatric:         Thought Content: Thought content normal.          Bereket Knowles Jr, MD  [facility] PRIMARY CARE

## 2024-05-08 ENCOUNTER — TELEPHONE (OUTPATIENT)
Dept: FAMILY MEDICINE CLINIC | Facility: CLINIC | Age: 79
End: 2024-05-08

## 2024-05-08 NOTE — TELEPHONE ENCOUNTER
YUNIERM for pt to call back to reschedule his Nov. 6, 2024 1:15 PM appointment with Dr. Chawla due to him changing his office hours.

## 2024-10-08 DIAGNOSIS — I10 BENIGN ESSENTIAL HTN: ICD-10-CM

## 2024-10-08 DIAGNOSIS — I10 HYPERTENSION, UNSPECIFIED TYPE: ICD-10-CM

## 2024-10-08 RX ORDER — AMLODIPINE BESYLATE 5 MG/1
5 TABLET ORAL DAILY
Qty: 90 TABLET | Refills: 1 | Status: SHIPPED | OUTPATIENT
Start: 2024-10-08

## 2024-10-08 RX ORDER — LOSARTAN POTASSIUM 100 MG/1
100 TABLET ORAL DAILY
Qty: 90 TABLET | Refills: 1 | Status: SHIPPED | OUTPATIENT
Start: 2024-10-08

## 2024-10-08 NOTE — TELEPHONE ENCOUNTER
Patient called to request a refill for their Amlodipine 5 mg advised a refill was requested on 10/8/24 and is pending approval. Patient verbalized understanding and is in agreement.    Patient only has 3 tablets.

## 2024-10-26 ENCOUNTER — APPOINTMENT (OUTPATIENT)
Dept: LAB | Age: 79
End: 2024-10-26
Payer: COMMERCIAL

## 2024-10-26 DIAGNOSIS — I10 BENIGN ESSENTIAL HYPERTENSION: ICD-10-CM

## 2024-10-26 DIAGNOSIS — E78.00 HYPERCHOLESTEROLEMIA: ICD-10-CM

## 2024-10-26 DIAGNOSIS — E55.9 VITAMIN D DEFICIENCY: ICD-10-CM

## 2024-10-26 DIAGNOSIS — L20.84 INTRINSIC ECZEMA: ICD-10-CM

## 2024-10-26 DIAGNOSIS — I65.22 STENOSIS OF LEFT CAROTID ARTERY: ICD-10-CM

## 2024-10-26 DIAGNOSIS — R73.9 HYPERGLYCEMIA: ICD-10-CM

## 2024-10-26 LAB
25(OH)D3 SERPL-MCNC: 50.2 NG/ML (ref 30–100)
ALBUMIN SERPL BCG-MCNC: 4.2 G/DL (ref 3.5–5)
ALP SERPL-CCNC: 56 U/L (ref 34–104)
ALT SERPL W P-5'-P-CCNC: 12 U/L (ref 7–52)
ANION GAP SERPL CALCULATED.3IONS-SCNC: 3 MMOL/L (ref 4–13)
AST SERPL W P-5'-P-CCNC: 20 U/L (ref 13–39)
BASOPHILS # BLD AUTO: 0.07 THOUSANDS/ΜL (ref 0–0.1)
BASOPHILS NFR BLD AUTO: 1 % (ref 0–1)
BILIRUB SERPL-MCNC: 1.66 MG/DL (ref 0.2–1)
BUN SERPL-MCNC: 22 MG/DL (ref 5–25)
CALCIUM SERPL-MCNC: 9.8 MG/DL (ref 8.4–10.2)
CHLORIDE SERPL-SCNC: 106 MMOL/L (ref 96–108)
CHOLEST SERPL-MCNC: 133 MG/DL
CO2 SERPL-SCNC: 32 MMOL/L (ref 21–32)
CREAT SERPL-MCNC: 1.11 MG/DL (ref 0.6–1.3)
EOSINOPHIL # BLD AUTO: 0.43 THOUSAND/ΜL (ref 0–0.61)
EOSINOPHIL NFR BLD AUTO: 6 % (ref 0–6)
ERYTHROCYTE [DISTWIDTH] IN BLOOD BY AUTOMATED COUNT: 13.2 % (ref 11.6–15.1)
EST. AVERAGE GLUCOSE BLD GHB EST-MCNC: 120 MG/DL
GFR SERPL CREATININE-BSD FRML MDRD: 62 ML/MIN/1.73SQ M
GLUCOSE P FAST SERPL-MCNC: 101 MG/DL (ref 65–99)
HBA1C MFR BLD: 5.8 %
HCT VFR BLD AUTO: 45.3 % (ref 36.5–49.3)
HDLC SERPL-MCNC: 50 MG/DL
HGB BLD-MCNC: 14.7 G/DL (ref 12–17)
IMM GRANULOCYTES # BLD AUTO: 0.02 THOUSAND/UL (ref 0–0.2)
IMM GRANULOCYTES NFR BLD AUTO: 0 % (ref 0–2)
LDLC SERPL CALC-MCNC: 67 MG/DL (ref 0–100)
LYMPHOCYTES # BLD AUTO: 1.75 THOUSANDS/ΜL (ref 0.6–4.47)
LYMPHOCYTES NFR BLD AUTO: 24 % (ref 14–44)
MCH RBC QN AUTO: 30.4 PG (ref 26.8–34.3)
MCHC RBC AUTO-ENTMCNC: 32.5 G/DL (ref 31.4–37.4)
MCV RBC AUTO: 94 FL (ref 82–98)
MONOCYTES # BLD AUTO: 0.68 THOUSAND/ΜL (ref 0.17–1.22)
MONOCYTES NFR BLD AUTO: 9 % (ref 4–12)
NEUTROPHILS # BLD AUTO: 4.35 THOUSANDS/ΜL (ref 1.85–7.62)
NEUTS SEG NFR BLD AUTO: 60 % (ref 43–75)
NRBC BLD AUTO-RTO: 0 /100 WBCS
PLATELET # BLD AUTO: 274 THOUSANDS/UL (ref 149–390)
PMV BLD AUTO: 11.1 FL (ref 8.9–12.7)
POTASSIUM SERPL-SCNC: 5.4 MMOL/L (ref 3.5–5.3)
PROT SERPL-MCNC: 6.9 G/DL (ref 6.4–8.4)
RBC # BLD AUTO: 4.83 MILLION/UL (ref 3.88–5.62)
SODIUM SERPL-SCNC: 141 MMOL/L (ref 135–147)
T4 FREE SERPL-MCNC: 0.78 NG/DL (ref 0.61–1.12)
TRIGL SERPL-MCNC: 82 MG/DL
TSH SERPL DL<=0.05 MIU/L-ACNC: 0.09 UIU/ML (ref 0.45–4.5)
WBC # BLD AUTO: 7.3 THOUSAND/UL (ref 4.31–10.16)

## 2024-10-26 PROCEDURE — 84439 ASSAY OF FREE THYROXINE: CPT

## 2024-10-26 PROCEDURE — 82306 VITAMIN D 25 HYDROXY: CPT

## 2024-10-26 PROCEDURE — 80061 LIPID PANEL: CPT

## 2024-10-26 PROCEDURE — 36415 COLL VENOUS BLD VENIPUNCTURE: CPT

## 2024-10-26 PROCEDURE — 80053 COMPREHEN METABOLIC PANEL: CPT

## 2024-10-26 PROCEDURE — 83036 HEMOGLOBIN GLYCOSYLATED A1C: CPT

## 2024-10-26 PROCEDURE — 84443 ASSAY THYROID STIM HORMONE: CPT

## 2024-10-26 PROCEDURE — 85025 COMPLETE CBC W/AUTO DIFF WBC: CPT

## 2024-11-12 ENCOUNTER — OFFICE VISIT (OUTPATIENT)
Dept: FAMILY MEDICINE CLINIC | Facility: CLINIC | Age: 79
End: 2024-11-12
Payer: COMMERCIAL

## 2024-11-12 VITALS
SYSTOLIC BLOOD PRESSURE: 116 MMHG | WEIGHT: 192 LBS | DIASTOLIC BLOOD PRESSURE: 68 MMHG | BODY MASS INDEX: 26.01 KG/M2 | HEART RATE: 65 BPM | OXYGEN SATURATION: 98 % | HEIGHT: 72 IN

## 2024-11-12 DIAGNOSIS — N18.31 STAGE 3A CHRONIC KIDNEY DISEASE (HCC): ICD-10-CM

## 2024-11-12 DIAGNOSIS — I10 BENIGN ESSENTIAL HYPERTENSION: Primary | ICD-10-CM

## 2024-11-12 DIAGNOSIS — I65.22 STENOSIS OF LEFT CAROTID ARTERY: ICD-10-CM

## 2024-11-12 DIAGNOSIS — Z12.5 PROSTATE CANCER SCREENING: ICD-10-CM

## 2024-11-12 DIAGNOSIS — R73.9 HYPERGLYCEMIA: ICD-10-CM

## 2024-11-12 DIAGNOSIS — E55.9 VITAMIN D DEFICIENCY: ICD-10-CM

## 2024-11-12 DIAGNOSIS — E05.90 HYPERTHYROIDISM: ICD-10-CM

## 2024-11-12 DIAGNOSIS — E78.00 HYPERCHOLESTEROLEMIA: ICD-10-CM

## 2024-11-12 DIAGNOSIS — E87.5 HYPERKALEMIA: ICD-10-CM

## 2024-11-12 PROCEDURE — 99214 OFFICE O/P EST MOD 30 MIN: CPT | Performed by: FAMILY MEDICINE

## 2024-11-12 NOTE — ASSESSMENT & PLAN NOTE
Continue statin therapy  Orders:    CBC and differential; Future    Comprehensive metabolic panel; Future    Lipid Panel with Direct LDL reflex; Future

## 2024-11-12 NOTE — ASSESSMENT & PLAN NOTE
Continue to monitor vitamin D.  Orders:    Comprehensive metabolic panel; Future    Vitamin D 25 hydroxy; Future

## 2024-11-12 NOTE — ASSESSMENT & PLAN NOTE
Lab Results   Component Value Date    EGFR 62 10/26/2024    EGFR 56 04/02/2024    EGFR 53 08/29/2023    CREATININE 1.11 10/26/2024    CREATININE 1.22 04/02/2024    CREATININE 1.28 08/29/2023   Renal function remains quite stable.

## 2024-11-12 NOTE — ASSESSMENT & PLAN NOTE
Check carotid duplex next year.  Continue risk factor modification  Orders:    VAS carotid complete study; Future

## 2024-11-12 NOTE — PROGRESS NOTES
Ambulatory Visit  Name: Sivakumar Calix      : 1945      MRN: [id number]  Encounter Provider: Bereket Knowles Jr, MD  Encounter Date: 2024   Encounter department: [facility] PRIMARY CARE    Assessment & Plan  Benign essential hypertension  Well-controlled at this time.  Orders:    Comprehensive metabolic panel; Future    CBC and differential; Future    CBC and differential; Future    Comprehensive metabolic panel; Future    Stenosis of left carotid artery  Check carotid duplex next year.  Continue risk factor modification  Orders:    VAS carotid complete study; Future    Hypercholesterolemia  Continue statin therapy  Orders:    CBC and differential; Future    Comprehensive metabolic panel; Future    Lipid Panel with Direct LDL reflex; Future    Hyperglycemia  Continue monitor A1c.  Continue with routine exercise.  Orders:    Hemoglobin A1C; Future    Vitamin D deficiency  Continue to monitor vitamin D.  Orders:    Comprehensive metabolic panel; Future    Vitamin D 25 hydroxy; Future    Stage 3a chronic kidney disease (HCC)  Lab Results   Component Value Date    EGFR 62 10/26/2024    EGFR 56 2024    EGFR 53 2023    CREATININE 1.11 10/26/2024    CREATININE 1.22 2024    CREATININE 1.28 2023   Renal function remains quite stable.         Hyperkalemia  Mild hyperkalemia on recent labs.  Check labs in 2 months and then in 6 months  Orders:    Comprehensive metabolic panel; Future    Comprehensive metabolic panel; Future    Hyperthyroidism  TSH recently was suppressed.  He did have a mild elevation of TSH a few months ago.  Repeat TSH with reflex T4 in 2 months in 6 months.  Consider thyroid imaging if they remain suppressed.  Orders:    TSH, 3rd generation with Free T4 reflex; Future    TSH, 3rd generation with Free T4 reflex; Future    Prostate cancer screening  After discussing risks and benefits he would like to proceed with prostate cancer screening.  Orders:     PSA, Total Screen; Future       History of Present Illness     HPI patient resents today for follow-up of chronic health issues.  Overall, is doing quite well.  He continues to be very active.  He has a history of hypertension.  Blood pressures at home have been slightly elevated.  We have not checked his cuff recently but I did tell him to bring it in next time as his pressure today was low.  He denies any chest pain, shortness of breath or palpitations.  He remains extremely active with swimming and is doing well.  He has some chronic low back pain which seems to be stable at this time.      Review of Systems   Constitutional:  Negative for appetite change, chills, fatigue, fever and unexpected weight change.   HENT:  Negative for trouble swallowing.    Eyes:  Negative for visual disturbance.   Respiratory:  Negative for cough, chest tightness, shortness of breath and wheezing.    Cardiovascular:  Negative for chest pain, palpitations and leg swelling.   Gastrointestinal:  Negative for abdominal distention, abdominal pain, blood in stool, constipation and diarrhea.   Endocrine: Negative for polyuria.   Genitourinary:  Negative for difficulty urinating and flank pain.   Musculoskeletal:  Positive for back pain. Negative for arthralgias, gait problem and myalgias.   Skin:  Negative for rash.   Neurological:  Negative for dizziness and light-headedness.   Hematological:  Negative for adenopathy. Does not bruise/bleed easily.   Psychiatric/Behavioral:  Negative for dysphoric mood and sleep disturbance. The patient is not nervous/anxious.            Objective     /78   Pulse 65   Ht 6' (1.829 m)   Wt 87.1 kg (192 lb)   SpO2 98%   BMI 26.04 kg/m²     Physical Exam  Constitutional:       General: He is not in acute distress.     Appearance: Normal appearance. He is well-developed. He is not diaphoretic.   HENT:      Head: Normocephalic.      Right Ear: External ear normal.      Left Ear: External ear normal.       Nose: Nose normal.   Eyes:      General:         Right eye: No discharge.         Left eye: No discharge.      Conjunctiva/sclera: Conjunctivae normal.      Pupils: Pupils are equal, round, and reactive to light.   Neck:      Thyroid: No thyromegaly.      Trachea: No tracheal deviation.   Cardiovascular:      Rate and Rhythm: Normal rate and regular rhythm.      Heart sounds: Normal heart sounds. No murmur heard.     No friction rub.   Pulmonary:      Effort: Pulmonary effort is normal. No respiratory distress.      Breath sounds: Normal breath sounds. No wheezing.   Chest:      Chest wall: No tenderness.   Abdominal:      General: There is no distension.      Palpations: There is no mass.      Tenderness: There is no abdominal tenderness. There is no guarding or rebound.      Hernia: No hernia is present.   Musculoskeletal:         General: No swelling or deformity.      Cervical back: Normal range of motion.      Right lower leg: No edema.      Left lower leg: No edema.   Skin:     Findings: No erythema or rash.   Neurological:      General: No focal deficit present.      Mental Status: He is alert.      Cranial Nerves: No cranial nerve deficit.      Coordination: Coordination normal.   Psychiatric:         Thought Content: Thought content normal.

## 2024-11-12 NOTE — ASSESSMENT & PLAN NOTE
Well-controlled at this time.  Orders:    Comprehensive metabolic panel; Future    CBC and differential; Future    CBC and differential; Future    Comprehensive metabolic panel; Future

## 2024-12-08 DIAGNOSIS — E78.00 HYPERCHOLESTEROLEMIA: ICD-10-CM

## 2024-12-09 RX ORDER — ROSUVASTATIN CALCIUM 10 MG/1
10 TABLET, COATED ORAL DAILY
Qty: 90 TABLET | Refills: 3 | Status: SHIPPED | OUTPATIENT
Start: 2024-12-09

## 2024-12-26 DIAGNOSIS — J30.9 ALLERGIC RHINITIS, UNSPECIFIED SEASONALITY, UNSPECIFIED TRIGGER: ICD-10-CM

## 2024-12-26 RX ORDER — FLUTICASONE PROPIONATE 50 MCG
2 SPRAY, SUSPENSION (ML) NASAL DAILY
Qty: 48 G | Refills: 1 | Status: SHIPPED | OUTPATIENT
Start: 2024-12-26

## 2025-04-01 ENCOUNTER — HOSPITAL ENCOUNTER (OUTPATIENT)
Dept: NON INVASIVE DIAGNOSTICS | Facility: CLINIC | Age: 80
Discharge: HOME | End: 2025-04-01
Payer: COMMERCIAL

## 2025-04-01 DIAGNOSIS — I65.22 STENOSIS OF LEFT CAROTID ARTERY: ICD-10-CM

## 2025-04-01 PROCEDURE — 93880 EXTRACRANIAL BILAT STUDY: CPT | Performed by: SURGERY

## 2025-04-01 PROCEDURE — 93880 EXTRACRANIAL BILAT STUDY: CPT

## 2025-04-02 ENCOUNTER — RESULTS FOLLOW-UP (OUTPATIENT)
Dept: FAMILY MEDICINE CLINIC | Facility: CLINIC | Age: 80
End: 2025-04-02

## 2025-04-07 DIAGNOSIS — I10 BENIGN ESSENTIAL HTN: ICD-10-CM

## 2025-04-07 DIAGNOSIS — I10 HYPERTENSION, UNSPECIFIED TYPE: ICD-10-CM

## 2025-04-07 NOTE — TELEPHONE ENCOUNTER
Reason for call:   [x] Refill   [] Prior Auth  [] Other:     Office:   [x] PCP/Provider - [place]  [] Specialty/Provider -     Medication:   Amlodipine 5 mg, 1 qd, 90  Losartan 100 mg, 1 qd 90    Pharmacy:   [facility]   Does the patient have enough for 3 days?   [x] Yes   [] No - Send as HP to POD    [facility]   Does the patient have enough for 10 days?   [] Yes   [] No - Send as HP to POD

## 2025-04-08 RX ORDER — AMLODIPINE BESYLATE 5 MG/1
5 TABLET ORAL DAILY
Qty: 90 TABLET | Refills: 1 | Status: SHIPPED | OUTPATIENT
Start: 2025-04-08

## 2025-04-09 RX ORDER — LOSARTAN POTASSIUM 100 MG/1
100 TABLET ORAL DAILY
Qty: 90 TABLET | Refills: 1 | Status: SHIPPED | OUTPATIENT
Start: 2025-04-09

## 2025-05-03 ENCOUNTER — APPOINTMENT (OUTPATIENT)
Dept: LAB | Age: 80
End: 2025-05-03
Attending: FAMILY MEDICINE
Payer: COMMERCIAL

## 2025-05-03 DIAGNOSIS — E87.5 HYPERKALEMIA: ICD-10-CM

## 2025-05-03 DIAGNOSIS — E55.9 VITAMIN D DEFICIENCY: ICD-10-CM

## 2025-05-03 DIAGNOSIS — I10 BENIGN ESSENTIAL HYPERTENSION: ICD-10-CM

## 2025-05-03 DIAGNOSIS — R73.9 HYPERGLYCEMIA: ICD-10-CM

## 2025-05-03 DIAGNOSIS — E05.90 HYPERTHYROIDISM: ICD-10-CM

## 2025-05-03 DIAGNOSIS — E78.00 HYPERCHOLESTEROLEMIA: ICD-10-CM

## 2025-05-03 DIAGNOSIS — Z12.5 PROSTATE CANCER SCREENING: ICD-10-CM

## 2025-05-03 LAB
ALBUMIN SERPL BCG-MCNC: 4.2 G/DL (ref 3.5–5)
ALP SERPL-CCNC: 59 U/L (ref 34–104)
ALT SERPL W P-5'-P-CCNC: 13 U/L (ref 7–52)
ANION GAP SERPL CALCULATED.3IONS-SCNC: 8 MMOL/L (ref 4–13)
AST SERPL W P-5'-P-CCNC: 21 U/L (ref 13–39)
BASOPHILS # BLD AUTO: 0.07 THOUSANDS/ÂΜL (ref 0–0.1)
BASOPHILS NFR BLD AUTO: 1 % (ref 0–1)
BILIRUB SERPL-MCNC: 1.74 MG/DL (ref 0.2–1)
BUN SERPL-MCNC: 18 MG/DL (ref 5–25)
CALCIUM SERPL-MCNC: 9.8 MG/DL (ref 8.4–10.2)
CHLORIDE SERPL-SCNC: 106 MMOL/L (ref 96–108)
CHOLEST SERPL-MCNC: 150 MG/DL (ref ?–200)
CO2 SERPL-SCNC: 29 MMOL/L (ref 21–32)
CREAT SERPL-MCNC: 1.38 MG/DL (ref 0.6–1.3)
EOSINOPHIL # BLD AUTO: 0.32 THOUSAND/ÂΜL (ref 0–0.61)
EOSINOPHIL NFR BLD AUTO: 5 % (ref 0–6)
ERYTHROCYTE [DISTWIDTH] IN BLOOD BY AUTOMATED COUNT: 13.7 % (ref 11.6–15.1)
EST. AVERAGE GLUCOSE BLD GHB EST-MCNC: 117 MG/DL
GFR SERPL CREATININE-BSD FRML MDRD: 48 ML/MIN/1.73SQ M
GLUCOSE P FAST SERPL-MCNC: 97 MG/DL (ref 65–99)
HBA1C MFR BLD: 5.7 %
HCT VFR BLD AUTO: 45.5 % (ref 36.5–49.3)
HDLC SERPL-MCNC: 61 MG/DL
HGB BLD-MCNC: 14.6 G/DL (ref 12–17)
IMM GRANULOCYTES # BLD AUTO: 0.02 THOUSAND/UL (ref 0–0.2)
IMM GRANULOCYTES NFR BLD AUTO: 0 % (ref 0–2)
LDLC SERPL CALC-MCNC: 73 MG/DL (ref 0–100)
LYMPHOCYTES # BLD AUTO: 1.54 THOUSANDS/ÂΜL (ref 0.6–4.47)
LYMPHOCYTES NFR BLD AUTO: 23 % (ref 14–44)
MCH RBC QN AUTO: 30.3 PG (ref 26.8–34.3)
MCHC RBC AUTO-ENTMCNC: 32.1 G/DL (ref 31.4–37.4)
MCV RBC AUTO: 94 FL (ref 82–98)
MONOCYTES # BLD AUTO: 0.49 THOUSAND/ÂΜL (ref 0.17–1.22)
MONOCYTES NFR BLD AUTO: 7 % (ref 4–12)
NEUTROPHILS # BLD AUTO: 4.34 THOUSANDS/ÂΜL (ref 1.85–7.62)
NEUTS SEG NFR BLD AUTO: 64 % (ref 43–75)
NRBC BLD AUTO-RTO: 0 /100 WBCS
PLATELET # BLD AUTO: 271 THOUSANDS/UL (ref 149–390)
PMV BLD AUTO: 11.2 FL (ref 8.9–12.7)
POTASSIUM SERPL-SCNC: 5.4 MMOL/L (ref 3.5–5.3)
PROT SERPL-MCNC: 6.8 G/DL (ref 6.4–8.4)
PSA SERPL-MCNC: 2.66 NG/ML (ref 0–4)
RBC # BLD AUTO: 4.82 MILLION/UL (ref 3.88–5.62)
SODIUM SERPL-SCNC: 143 MMOL/L (ref 135–147)
TRIGL SERPL-MCNC: 78 MG/DL (ref ?–150)
TSH SERPL DL<=0.05 MIU/L-ACNC: 2.83 UIU/ML (ref 0.45–4.5)
WBC # BLD AUTO: 6.78 THOUSAND/UL (ref 4.31–10.16)

## 2025-05-03 PROCEDURE — 83036 HEMOGLOBIN GLYCOSYLATED A1C: CPT

## 2025-05-03 PROCEDURE — 80053 COMPREHEN METABOLIC PANEL: CPT

## 2025-05-03 PROCEDURE — 80061 LIPID PANEL: CPT

## 2025-05-03 PROCEDURE — 85025 COMPLETE CBC W/AUTO DIFF WBC: CPT

## 2025-05-03 PROCEDURE — 84443 ASSAY THYROID STIM HORMONE: CPT

## 2025-05-03 PROCEDURE — 36415 COLL VENOUS BLD VENIPUNCTURE: CPT

## 2025-05-03 PROCEDURE — G0103 PSA SCREENING: HCPCS

## 2025-05-14 ENCOUNTER — OFFICE VISIT (OUTPATIENT)
Dept: FAMILY MEDICINE CLINIC | Facility: CLINIC | Age: 80
End: 2025-05-14
Payer: COMMERCIAL

## 2025-05-14 ENCOUNTER — APPOINTMENT (OUTPATIENT)
Dept: RADIOLOGY | Facility: MEDICAL CENTER | Age: 80
End: 2025-05-14
Attending: FAMILY MEDICINE
Payer: COMMERCIAL

## 2025-05-14 VITALS
TEMPERATURE: 98.3 F | OXYGEN SATURATION: 98 % | HEART RATE: 61 BPM | WEIGHT: 205.6 LBS | BODY MASS INDEX: 27.85 KG/M2 | SYSTOLIC BLOOD PRESSURE: 134 MMHG | HEIGHT: 72 IN | DIASTOLIC BLOOD PRESSURE: 70 MMHG

## 2025-05-14 DIAGNOSIS — I12.9 HYPERTENSIVE KIDNEY DISEASE WITH STAGE 3A CHRONIC KIDNEY DISEASE (HCC): ICD-10-CM

## 2025-05-14 DIAGNOSIS — E78.00 HYPERCHOLESTEROLEMIA: ICD-10-CM

## 2025-05-14 DIAGNOSIS — M25.561 ACUTE PAIN OF RIGHT KNEE: ICD-10-CM

## 2025-05-14 DIAGNOSIS — R73.9 HYPERGLYCEMIA: ICD-10-CM

## 2025-05-14 DIAGNOSIS — M77.01 MEDIAL EPICONDYLITIS OF RIGHT ELBOW: ICD-10-CM

## 2025-05-14 DIAGNOSIS — E55.9 VITAMIN D DEFICIENCY: ICD-10-CM

## 2025-05-14 DIAGNOSIS — M25.561 ACUTE PAIN OF RIGHT KNEE: Primary | ICD-10-CM

## 2025-05-14 DIAGNOSIS — I10 BENIGN ESSENTIAL HYPERTENSION: ICD-10-CM

## 2025-05-14 DIAGNOSIS — N18.31 STAGE 3A CHRONIC KIDNEY DISEASE (HCC): ICD-10-CM

## 2025-05-14 DIAGNOSIS — N18.31 HYPERTENSIVE KIDNEY DISEASE WITH STAGE 3A CHRONIC KIDNEY DISEASE (HCC): ICD-10-CM

## 2025-05-14 PROCEDURE — 73564 X-RAY EXAM KNEE 4 OR MORE: CPT

## 2025-05-14 PROCEDURE — 99214 OFFICE O/P EST MOD 30 MIN: CPT | Performed by: FAMILY MEDICINE

## 2025-05-14 RX ORDER — METHYLPREDNISOLONE 4 MG/1
TABLET ORAL
Qty: 21 TABLET | Refills: 0 | Status: SHIPPED | OUTPATIENT
Start: 2025-05-14

## 2025-05-14 NOTE — ASSESSMENT & PLAN NOTE
Monitor A1c with routine labs.  Orders:    Hemoglobin A1C; Future    Lipid Panel with Direct LDL reflex; Future

## 2025-05-14 NOTE — ASSESSMENT & PLAN NOTE
Monitor lipids and CMP.  Orders:    Comprehensive metabolic panel; Future    Comprehensive metabolic panel; Future

## 2025-05-14 NOTE — PATIENT INSTRUCTIONS
"Patient Education     Chronic kidney disease   The Basics   Written by the doctors and editors at [facility]   What is chronic kidney disease? -- Chronic kidney disease, or \"CKD,\" is when the kidneys stop working as well as they should. When they are working normally, the kidneys filter blood and remove waste and excess salt and water (figure 1).  In people with CKD, the kidneys slowly lose the ability to filter blood. In time, the kidneys can stop working completely. That is why it is so important to keep CKD from getting worse.  What are the symptoms of CKD? -- At first, CKD causes no symptoms. As the disease gets worse, it can:   Make your feet, ankles, or legs swell (called \"edema\")   Give you high blood pressure   Make you very tired   Damage your bones  Will I need tests? -- Yes. Your doctor will want to see you regularly. You will probably have appointments at least once a year, and you will get regular tests to check your kidneys. These include blood and urine tests.  If your CKD gets worse over time, you will probably need to see a \"nephrologist.\" This is a doctor who takes care of people with kidney disease.  Is there anything I can do to keep my kidneys from getting worse? -- Yes. If you have CKD, you can protect your kidneys if you:   Take all of your prescribed medicines every day, and follow all of your doctor's instructions for how to take them.   Keep your blood sugar in a healthy range, if you have diabetes.   Change your diet, if your doctor or nurse recommends to. They might suggest working with a dietitian (nutrition expert).   Quit smoking, if you smoke.   Lose weight, if you have excess body weight.   Avoid medicines that can harm the kidneys - One example is nonsteroidal antiinflammatory drugs (\"NSAIDs\"). These medicines include ibuprofen (sample brand names: Advil, Motrin) and naproxen (sample brand name: Aleve). There are other medicines that people with CKD need to avoid, too. Check with your " "doctor, nurse, or kidney specialist before starting any new medicines or supplements, even those you can buy without a prescription.  How is CKD treated? -- People in the early stages of CKD can take medicines to keep the disease from getting worse. For example, many people with CKD should take medicines known as \"ACE inhibitors\" or \"angiotensin receptor blockers.\" If your doctor or nurse prescribes these medicines, it is very important that you take them every day as directed. If they cause side effects or cost too much, tell your doctor or nurse. They might have solutions to offer.  What happens if my kidneys stop working completely? -- If your kidneys can no longer filter blood properly, you can choose between 3 different treatments to take over their job. Your choices are:   Kidney transplant - After transplant surgery, the new kidney can do the job of your own kidneys. If you have a kidney transplant, you will need to take medicines for the rest of your life to keep your body from reacting badly to the new kidney. (You only need 1 kidney to live.)   Hemodialysis - This is a procedure in which a dialysis machine takes over the job of the kidneys. The machine pumps blood out of the body, filters it, and returns it to the body. If you choose hemodialysis, you will need to be hooked up to the machine at least 3 times a week for several hours for the rest of your life. Before you start, you will also need to have surgery to prepare a blood vessel for attachment to the machine.   Peritoneal dialysis - This involves piping a special fluid into the belly every day. If you choose peritoneal dialysis, you will need surgery to have a tube implanted in your belly. Then, you will have to learn how to pipe the fluid in and out through that tube.  How do I choose between the different treatment options? -- You and your doctor will need to work together to find a treatment that's right for you. Kidney transplant surgery is " usually the best option for most people. But often, there are no kidneys available for transplant.  Ask your doctor to explain all of your options and how they might work for you. Then, talk openly with them about how you feel about all of the options. You might even decide that you do not want any treatment. That is your choice.  All topics are updated as new evidence becomes available and our peer review process is complete.  This topic retrieved from [employer] on: May 18, 2024.  Topic 43760 Version 34.0  Release: 32.4.3 - C32.137  © 2024 UpToDate, Inc[employer] and/or its affiliates. All rights reserved.  figure 1: Anatomy of the urinary tract     Urine is made by the kidneys. It passes from the kidneys into the bladder through 2 tubes called the ureters. Then, it leaves the bladder through another tube called the urethra.  Graphic 17405 Version 8.0  Consumer Information Use and Disclaimer   Disclaimer: This generalized information is a limited summary of diagnosis, treatment, and/or medication information. It is not meant to be comprehensive and should be used as a tool to help the user understand and/or assess potential diagnostic and treatment options. It does NOT include all information about conditions, treatments, medications, side effects, or risks that may apply to a specific patient. It is not intended to be medical advice or a substitute for the medical advice, diagnosis, or treatment of a health care provider based on the health care provider's examination and assessment of a patient's specific and unique circumstances. Patients must speak with a health care provider for complete information about their health, medical questions, and treatment options, including any risks or benefits regarding use of medications. This information does not endorse any treatments or medications as safe, effective, or approved for treating a specific patient. Up[employer]Date, Inc. and its affiliates disclaim any warranty or liability  relating to this information or the use thereof.The use of this information is governed by the Terms of Use, available at https://www.wolterskluwer.com/en/know/clinical-effectiveness-terms. 2024© UpToDate, Inc. and its affiliates and/or licensors. All rights reserved.  Copyright   © 2024 [employer] and/or its affiliates. All rights reserved.

## 2025-05-14 NOTE — ASSESSMENT & PLAN NOTE
Pain over the right medial epicondyle.  He is going to be placed on Medrol Dosepak for his significant knee pain which will hopefully help.  Consider PT at [facility].  Orders:    methylPREDNISolone 4 MG tablet therapy pack; Use as directed on package

## 2025-05-14 NOTE — ASSESSMENT & PLAN NOTE
Lab Results   Component Value Date    EGFR 48 05/03/2025    EGFR 62 10/26/2024    EGFR 56 04/02/2024    CREATININE 1.38 (H) 05/03/2025    CREATININE 1.11 10/26/2024    CREATININE 1.22 04/02/2024   He has had some worsening of his CKD with creatinine 1.38.  He avoids NSAIDs routinely.  Check labs as outlined.    Orders:    Comprehensive metabolic panel; Future    CBC and differential; Future    Albumin / creatinine urine ratio; Future    Urinalysis with microscopic; Future    PTH, intact; Future    Vitamin D 25 hydroxy; Future    Phosphorus; Future    Magnesium; Future    Comprehensive metabolic panel; Future    CBC and differential; Future    PTH, intact; Future    Vitamin D 25 hydroxy; Future    Phosphorus; Future

## 2025-05-14 NOTE — ASSESSMENT & PLAN NOTE
He has a history of CKD which has gotten a bit worse on recent blood work.  Check labs in 3 and 6 months and certainly follow-up within 6 months.  He does continue to avoid NSAIDs.  Blood pressure is well-controlled.  Orders:    Comprehensive metabolic panel; Future    CBC and differential; Future    Albumin / creatinine urine ratio; Future    Urinalysis with microscopic; Future    PTH, intact; Future    Vitamin D 25 hydroxy; Future    Phosphorus; Future    Magnesium; Future    Comprehensive metabolic panel; Future    CBC and differential; Future    PTH, intact; Future    Vitamin D 25 hydroxy; Future    Phosphorus; Future    Hemoglobin A1C; Future    Lipid Panel with Direct LDL reflex; Future

## 2025-05-14 NOTE — PROGRESS NOTES
Name: Sivakumar Calix      : 1945      MRN: [id number]  Encounter Provider: Bereket Knowles Jr, MD  Encounter Date: 2025   Encounter department: [facility]  :  Assessment & Plan  Acute pain of right knee  Patient presents with significant right knee pain after stepping into a hole while mowing his lawn 3 days ago.  He is having trouble bearing weight.  He has a significant effusion.  Check x-ray today.  Initiate Medrol Dosepak as he cannot take NSAIDs due to CKD.  Consider knee MRI.  Orders:    XR knee 4+ vw right injury; Future    methylPREDNISolone 4 MG tablet therapy pack; Use as directed on package    Medial epicondylitis of right elbow  Pain over the right medial epicondyle.  He is going to be placed on Medrol Dosepak for his significant knee pain which will hopefully help.  Consider PT at [facility].  Orders:    methylPREDNISolone 4 MG tablet therapy pack; Use as directed on package    Benign essential hypertension  He has a history of CKD which has gotten a bit worse on recent blood work.  Check labs in 3 and 6 months and certainly follow-up within 6 months.  He does continue to avoid NSAIDs.  Blood pressure is well-controlled.  Orders:    Comprehensive metabolic panel; Future    CBC and differential; Future    Albumin / creatinine urine ratio; Future    Urinalysis with microscopic; Future    PTH, intact; Future    Vitamin D 25 hydroxy; Future    Phosphorus; Future    Magnesium; Future    Comprehensive metabolic panel; Future    CBC and differential; Future    PTH, intact; Future    Vitamin D 25 hydroxy; Future    Phosphorus; Future    Hemoglobin A1C; Future    Lipid Panel with Direct LDL reflex; Future    Stage 3a chronic kidney disease (HCC)  Lab Results   Component Value Date    EGFR 48 2025    EGFR 62 10/26/2024    EGFR 56 2024    CREATININE 1.38 (H) 2025    CREATININE 1.11 10/26/2024    CREATININE 1.22 2024   He has had some worsening of his  CKD with creatinine 1.38.  He avoids NSAIDs routinely.  Check labs as outlined.    Orders:    Comprehensive metabolic panel; Future    CBC and differential; Future    Albumin / creatinine urine ratio; Future    Urinalysis with microscopic; Future    PTH, intact; Future    Vitamin D 25 hydroxy; Future    Phosphorus; Future    Magnesium; Future    Comprehensive metabolic panel; Future    CBC and differential; Future    PTH, intact; Future    Vitamin D 25 hydroxy; Future    Phosphorus; Future    Vitamin D deficiency  Monitor vitamin D       Hypercholesterolemia  Monitor lipids and CMP.  Orders:    Comprehensive metabolic panel; Future    Comprehensive metabolic panel; Future    Hyperglycemia  Monitor A1c with routine labs.  Orders:    Hemoglobin A1C; Future    Lipid Panel with Direct LDL reflex; Future    Hypertensive kidney disease with stage 3a chronic kidney disease (HCC)  Lab Results   Component Value Date    EGFR 48 05/03/2025    EGFR 62 10/26/2024    EGFR 56 04/02/2024    CREATININE 1.38 (H) 05/03/2025    CREATININE 1.11 10/26/2024    CREATININE 1.22 04/02/2024       Orders:    Comprehensive metabolic panel; Future    CBC and differential; Future    Albumin / creatinine urine ratio; Future    Urinalysis with microscopic; Future    PTH, intact; Future    Vitamin D 25 hydroxy; Future    Phosphorus; Future    Magnesium; Future    Comprehensive metabolic panel; Future    CBC and differential; Future    PTH, intact; Future    Vitamin D 25 hydroxy; Future    Phosphorus; Future    Hemoglobin A1C; Future    Lipid Panel with Direct LDL reflex; Future           History of Present Illness   Patient presents today for follow-up of chronic health issues.  Also has acute onset of right knee pain as well as right elbow pain.  He notes he was mowing his lawn 3 days ago and stepped into a hole.  Since that time, has had trouble weightbearing on his right knee.  He does note some prominence in the back of the knee.  Tylenol is not  been helpful.  He avoids NSAIDs due to CKD.  Creatinine did bump up.  He has been checking his blood pressure and mainly at home and has been good.  He denies any [place] chest pain, shortness of breath or palpitations.  He has a lot of pain in his right medial elbow this been present for about a month.  He denies any acute injury.  Pain is worse with palpation.      Review of Systems   Constitutional:  Negative for appetite change, chills, fatigue, fever and unexpected weight change.   HENT:  Negative for trouble swallowing.    Eyes:  Negative for visual disturbance.   Respiratory:  Negative for cough, chest tightness, shortness of breath and wheezing.    Cardiovascular:  Negative for chest pain, palpitations and leg swelling.   Gastrointestinal:  Negative for abdominal distention, abdominal pain, blood in stool, constipation and diarrhea.   Endocrine: Negative for polyuria.   Genitourinary:  Negative for difficulty urinating and flank pain.   Musculoskeletal:  Positive for arthralgias and gait problem. Negative for myalgias.   Skin:  Negative for rash.   Neurological:  Negative for dizziness and light-headedness.   Hematological:  Negative for adenopathy. Does not bruise/bleed easily.   Psychiatric/Behavioral:  Negative for dysphoric mood and sleep disturbance. The patient is not nervous/anxious.        Objective   /70 (BP Location: Left arm, Patient Position: Sitting, Cuff Size: Large)   Pulse 61   Temp 98.3 °F (36.8 °C)   Ht 6' (1.829 m)   Wt 93.3 kg (205 lb 9.6 oz)   SpO2 98%   BMI 27.88 kg/m²      Physical Exam  Constitutional:       General: He is not in acute distress.     Appearance: Normal appearance. He is well-developed. He is not diaphoretic.   HENT:      Head: Normocephalic.      Right Ear: External ear normal.      Left Ear: External ear normal.      Nose: Nose normal.     Eyes:      General:         Right eye: No discharge.         Left eye: No discharge.      Conjunctiva/sclera:  Conjunctivae normal.      Pupils: Pupils are equal, round, and reactive to light.     Neck:      Thyroid: No thyromegaly.      Trachea: No tracheal deviation.     Cardiovascular:      Rate and Rhythm: Normal rate and regular rhythm.      Heart sounds: Normal heart sounds. No murmur heard.     No friction rub.   Pulmonary:      Effort: Pulmonary effort is normal. No respiratory distress.      Breath sounds: Normal breath sounds. No wheezing.   Chest:      Chest wall: No tenderness.   Abdominal:      General: There is no distension.      Palpations: There is no mass.      Tenderness: There is no abdominal tenderness. There is no guarding or rebound.      Hernia: No hernia is present.     Musculoskeletal:         General: Swelling and tenderness (He has tenderness along his lateral joint space.  He has a significant effusion as well in the right knee.) present. No deformity.      Cervical back: Normal range of motion.      Right lower leg: No edema.      Left lower leg: No edema.      Comments: Pain over the right medial epicondyle     Skin:     Findings: No erythema or rash.     Neurological:      General: No focal deficit present.      Mental Status: He is alert.      Cranial Nerves: No cranial nerve deficit.      Coordination: Coordination normal.     Psychiatric:         Thought Content: Thought content normal.

## 2025-05-14 NOTE — ASSESSMENT & PLAN NOTE
Patient presents with significant right knee pain after stepping into a hole while mowing his lawn 3 days ago.  He is having trouble bearing weight.  He has a significant effusion.  Check x-ray today.  Initiate Medrol Dosepak as he cannot take NSAIDs due to CKD.  Consider knee MRI.  Orders:    XR knee 4+ vw right injury; Future    methylPREDNISolone 4 MG tablet therapy pack; Use as directed on package

## 2025-05-15 ENCOUNTER — RESULTS FOLLOW-UP (OUTPATIENT)
Dept: FAMILY MEDICINE CLINIC | Facility: CLINIC | Age: 80
End: 2025-05-15

## 2025-08-06 ENCOUNTER — VBI (OUTPATIENT)
Dept: ADMINISTRATIVE | Facility: OTHER | Age: 80
End: 2025-08-06